# Patient Record
Sex: FEMALE | Race: WHITE | NOT HISPANIC OR LATINO | Employment: OTHER | URBAN - METROPOLITAN AREA
[De-identification: names, ages, dates, MRNs, and addresses within clinical notes are randomized per-mention and may not be internally consistent; named-entity substitution may affect disease eponyms.]

---

## 2017-01-11 ENCOUNTER — HOSPITAL ENCOUNTER (OUTPATIENT)
Dept: LAB | Facility: MEDICAL CENTER | Age: 72
End: 2017-01-11
Attending: FAMILY MEDICINE
Payer: MEDICARE

## 2017-01-11 DIAGNOSIS — R79.89 INCREASED PTH LEVEL: ICD-10-CM

## 2017-01-11 DIAGNOSIS — E55.9 VITAMIN D INSUFFICIENCY: ICD-10-CM

## 2017-01-11 DIAGNOSIS — R74.8 ELEVATED ALKALINE PHOSPHATASE LEVEL: ICD-10-CM

## 2017-01-11 LAB
25(OH)D3 SERPL-MCNC: 44 NG/ML (ref 30–100)
ALP SERPL-CCNC: 98 U/L (ref 30–99)
CALCIUM SERPL-MCNC: 9.1 MG/DL (ref 8.4–10.2)
PTH-INTACT SERPL-MCNC: 80.7 PG/ML (ref 14–72)

## 2017-01-11 PROCEDURE — 36415 COLL VENOUS BLD VENIPUNCTURE: CPT

## 2017-01-11 PROCEDURE — 82310 ASSAY OF CALCIUM: CPT

## 2017-01-11 PROCEDURE — 84075 ASSAY ALKALINE PHOSPHATASE: CPT

## 2017-01-11 PROCEDURE — 83970 ASSAY OF PARATHORMONE: CPT

## 2017-01-11 PROCEDURE — 82306 VITAMIN D 25 HYDROXY: CPT

## 2017-02-23 ENCOUNTER — OFFICE VISIT (OUTPATIENT)
Dept: MEDICAL GROUP | Age: 72
End: 2017-02-23
Payer: MEDICARE

## 2017-02-23 VITALS
SYSTOLIC BLOOD PRESSURE: 130 MMHG | BODY MASS INDEX: 28.99 KG/M2 | DIASTOLIC BLOOD PRESSURE: 88 MMHG | OXYGEN SATURATION: 95 % | RESPIRATION RATE: 16 BRPM | HEART RATE: 86 BPM | HEIGHT: 65 IN | WEIGHT: 174 LBS | TEMPERATURE: 97.7 F

## 2017-02-23 DIAGNOSIS — F41.9 ANXIETY: ICD-10-CM

## 2017-02-23 DIAGNOSIS — I10 ESSENTIAL HYPERTENSION: ICD-10-CM

## 2017-02-23 DIAGNOSIS — R79.89 INCREASED PTH LEVEL: ICD-10-CM

## 2017-02-23 DIAGNOSIS — J01.00 ACUTE NON-RECURRENT MAXILLARY SINUSITIS: ICD-10-CM

## 2017-02-23 DIAGNOSIS — E55.9 VITAMIN D INSUFFICIENCY: ICD-10-CM

## 2017-02-23 DIAGNOSIS — K21.9 GASTROESOPHAGEAL REFLUX DISEASE WITHOUT ESOPHAGITIS: ICD-10-CM

## 2017-02-23 PROCEDURE — 1036F TOBACCO NON-USER: CPT | Performed by: FAMILY MEDICINE

## 2017-02-23 PROCEDURE — 3017F COLORECTAL CA SCREEN DOC REV: CPT | Performed by: FAMILY MEDICINE

## 2017-02-23 PROCEDURE — G8432 DEP SCR NOT DOC, RNG: HCPCS | Performed by: FAMILY MEDICINE

## 2017-02-23 PROCEDURE — 3014F SCREEN MAMMO DOC REV: CPT | Performed by: FAMILY MEDICINE

## 2017-02-23 PROCEDURE — 4040F PNEUMOC VAC/ADMIN/RCVD: CPT | Performed by: FAMILY MEDICINE

## 2017-02-23 PROCEDURE — G8420 CALC BMI NORM PARAMETERS: HCPCS | Performed by: FAMILY MEDICINE

## 2017-02-23 PROCEDURE — G8482 FLU IMMUNIZE ORDER/ADMIN: HCPCS | Performed by: FAMILY MEDICINE

## 2017-02-23 PROCEDURE — 99214 OFFICE O/P EST MOD 30 MIN: CPT | Performed by: FAMILY MEDICINE

## 2017-02-23 PROCEDURE — 1101F PT FALLS ASSESS-DOCD LE1/YR: CPT | Performed by: FAMILY MEDICINE

## 2017-02-23 RX ORDER — RANITIDINE 150 MG/1
TABLET ORAL
Qty: 180 TAB | Refills: 0 | Status: SHIPPED | OUTPATIENT
Start: 2017-02-23 | End: 2017-08-30

## 2017-02-23 RX ORDER — CLARITHROMYCIN 500 MG/1
500 TABLET, COATED ORAL 2 TIMES DAILY
Qty: 28 TAB | Refills: 0 | Status: SHIPPED
Start: 2017-02-23 | End: 2017-06-06

## 2017-02-23 ASSESSMENT — PATIENT HEALTH QUESTIONNAIRE - PHQ9: CLINICAL INTERPRETATION OF PHQ2 SCORE: 0

## 2017-02-23 NOTE — MR AVS SNAPSHOT
"        Martha Gregoryipes   2017 11:00 AM   Office Visit   MRN: [id number]    Department:  [address] [place] [address]   Dept Phone:  [phone]    Description:  Female : 1945   Provider:  Sabra Arguello M.D.           Reason for Visit     Results           Allergies as of 2017     Allergen Noted Reactions    Ace Inhibitors 2013       Cough    Ceclor [Cefaclor] 2013   Hives    Ceftin 2013   Hives    Cephalosporins 2013   Rash    Ciprofloxacin 2013   Rash    Kenalog 2016   Hives    Lamisil [Terbinafine Hcl] 2013       Latex 2013   Rash    Merthiolate [Thimerosal] 2013   Hives    Monistat [Tioconazole] 2013   Itching    Pcn [Penicillins] 2013   Hives    Sulfa Drugs 2013   Hives    Tetracycline 2013         You were diagnosed with     Acute non-recurrent maxillary sinusitis   [3399719]       Increased PTH level   [749805]       Vitamin D insufficiency   [726691]         Vital Signs     Blood Pressure Pulse Temperature Respirations Height Weight    130/88 mmHg 86 36.5 °C (97.7 °F) 16 1.651 m (5' 5\") 78.926 kg (174 lb)    Body Mass Index Oxygen Saturation Smoking Status             28.96 kg/m2 95% Never Smoker          Basic Information     Date Of Birth Sex Race Ethnicity Preferred Language    1945 Female White Non- English      Problem List              ICD-10-CM Priority Class Noted - Resolved    Mild asthma J45.998   9/10/2014 - Present    Elevated liver enzymes R74.8   9/10/2014 - Present    Abnormal thyroid function test R94.6   9/10/2014 - Present    Hyperlipidemia E78.5   9/10/2014 - Present    HTN (hypertension) I10   9/10/2014 - Present    Anxiety F41.9   9/10/2014 - Present    Transient global amnesia G45.4   9/10/2014 - Present    GERD (gastroesophageal reflux disease) K21.9   9/10/2014 - Present    Fatigue R53.83   2015 - Present    Family history of melanoma Z80.8   2016 - Present    OCD (obsessive " compulsive disorder) F42.9   Unknown - Present    Environmental allergies Z91.09   2/21/2016 - Present    Increased PTH level E34.9   5/4/2016 - Present    Vitamin D insufficiency E55.9   5/4/2016 - Present    Elevated alkaline phosphatase level R74.8   6/29/2016 - Present      Health Maintenance        Date Due Completion Dates    IMM DTaP/Tdap/Td Vaccine (1 - Tdap) 6/11/1965 ---    PAP SMEAR 6/11/1967 ---    IMM ZOSTER VACCINE 6/11/2006 ---    IMM PNEUMOCOCCAL 65+ (ADULT) LOW/MEDIUM RISK SERIES (2 of 2 - PPSV23) 11/18/2016 11/18/2015    MAMMOGRAM 6/21/2017 6/21/2016, 6/8/2015, 6/8/2015, 6/8/2015, 5/29/2015    COLONOSCOPY 5/9/2018 5/9/2013    BONE DENSITY 3/2/2021 3/2/2016, 4/1/2013, 2/21/2010, 11/12/2007            Current Immunizations     13-VALENT PCV PREVNAR 11/18/2015    Influenza TIV (IM) 10/25/2016    Influenza Vaccine Quad Inj (Pf) 11/13/2015      Below and/or attached are the medications your provider expects you to take. Review all of your home medications and newly ordered medications with your provider and/or pharmacist. Follow medication instructions as directed by your provider and/or pharmacist. Please keep your medication list with you and share with your provider. Update the information when medications are discontinued, doses are changed, or new medications (including over-the-counter products) are added; and carry medication information at all times in the event of emergency situations     Allergies:  ACE INHIBITORS - (reactions not documented)     CECLOR - Hives     CEFTIN - Hives     CEPHALOSPORINS - Rash     CIPROFLOXACIN - Rash     KENALOG - Hives     LAMISIL - (reactions not documented)     LATEX - Rash     MERTHIOLATE - Hives     MONISTAT - Itching     PCN - Hives     SULFA DRUGS - Hives     TETRACYCLINE - (reactions not documented)               Medications  Valid as of: February 23, 2017 - 11:46 AM    Generic Name Brand Name Tablet Size Instructions for use    Albuterol Sulfate (Aero  Soln) albuterol 108 (90 BASE) MCG/ACT Inhale 2 Puffs by mouth every 6 hours as needed.        Aspirin (Tablet Delayed Response) ECOTRIN 81 MG Take 81 mg by mouth every day.        Calcium-Vitamin D   Take  by mouth.        Cetirizine HCl (Tab) ZYRTEC 10 MG Take 10 mg by mouth every day.        Cholecalciferol (Cap) Vitamin D-3 1000 UNITS Take 2,000 Units by mouth.        Clarithromycin (Tab) BIAXIN 500 MG Take 1 Tab by mouth 2 times a day.        Cyanocobalamin (SL Tab) Vitamin B-12 5000 MCG Place  under tongue.        DHA-EPA-Flaxseed Oil-Vitamin E   Take  by mouth.        Docusate Sodium (Cap) COLACE 100 MG Take 100 mg by mouth every day.        Famotidine-Ca Carb-Mag Hydrox   Take  by mouth.        Fluticasone Propionate (Suspension) FLONASE 50 MCG/ACT Spray 2 Sprays in nose every day.        HydroCHLOROthiazide (Tab) HYDRODIURIL 25 MG Take 1 Tab by mouth every day.        Multiple Vitamins-Minerals   Take  by mouth.        Sertraline HCl (Tab) ZOLOFT 25 MG TAKE 1 TABLET BY MOUTH EVERY DAY.        .                 Medicines prescribed today were sent to:     [facility][id number] - [place], [place] - [address]    [address] [place] [address]    Phone: [phone] Fax: [phone]    Open 24 Hours?: No      Medication refill instructions:       If your prescription bottle indicates you have medication refills left, it is not necessary to call your provider’s office. Please contact your pharmacy and they will refill your medication.    If your prescription bottle indicates you do not have any refills left, you may request refills at any time through one of the following ways: The online [employer] system (except Urgent Care), by calling your provider’s office, or by asking your pharmacy to contact your provider’s office with a refill request. Medication refills are processed only during regular business hours and may not be available until the next business day. Your provider may request additional information  or to have a follow-up visit with you prior to refilling your medication.   *Please Note: Medication refills are assigned a new Rx number when refilled electronically. Your pharmacy may indicate that no refills were authorized even though a new prescription for the same medication is available at the pharmacy. Please request the medicine by name with the pharmacy before contacting your provider for a refill.        Your To Do List     Future Labs/Procedures Complete By Expires    PTH INTACT  As directed 2/23/2018    VITAMIN D,25 HYDROXY  As directed 8/26/2017         Shelf[employer] Access Code: Activation code not generated  Current Shelf[employer]ip Status: Active

## 2017-03-29 PROBLEM — L50.9 URTICARIA, UNSPECIFIED: Status: ACTIVE | Noted: 2017-03-29

## 2017-04-12 PROBLEM — D49.2 NEOPLASM OF UNSPECIFIED BEHAVIOR OF BONE, SOFT TISSUE, AND SKIN: Status: RESOLVED | Noted: 2017-03-29 | Resolved: 2017-04-12

## 2017-05-23 DIAGNOSIS — D72.819 LEUKOPENIA, UNSPECIFIED TYPE: ICD-10-CM

## 2017-05-23 DIAGNOSIS — R74.8 ELEVATED LIVER ENZYMES: ICD-10-CM

## 2017-05-23 DIAGNOSIS — E78.01 FAMILIAL HYPERCHOLESTEROLEMIA: ICD-10-CM

## 2017-05-29 ENCOUNTER — HOSPITAL ENCOUNTER (OUTPATIENT)
Dept: LAB | Facility: MEDICAL CENTER | Age: 72
End: 2017-05-29
Attending: FAMILY MEDICINE
Payer: MEDICARE

## 2017-05-29 DIAGNOSIS — E78.01 FAMILIAL HYPERCHOLESTEROLEMIA: ICD-10-CM

## 2017-05-29 DIAGNOSIS — D72.819 LEUKOPENIA, UNSPECIFIED TYPE: ICD-10-CM

## 2017-05-29 DIAGNOSIS — E55.9 VITAMIN D INSUFFICIENCY: ICD-10-CM

## 2017-05-29 DIAGNOSIS — R79.89 INCREASED PTH LEVEL: ICD-10-CM

## 2017-05-29 DIAGNOSIS — R74.8 ELEVATED LIVER ENZYMES: ICD-10-CM

## 2017-05-29 LAB
25(OH)D3 SERPL-MCNC: 47 NG/ML (ref 30–100)
ALBUMIN SERPL BCP-MCNC: 4.4 G/DL (ref 3.2–4.9)
ALBUMIN/GLOB SERPL: 1.4 G/DL
ALP SERPL-CCNC: 103 U/L (ref 30–99)
ALT SERPL-CCNC: 26 U/L (ref 2–50)
ANION GAP SERPL CALC-SCNC: 6 MMOL/L (ref 0–11.9)
AST SERPL-CCNC: 25 U/L (ref 12–45)
BASOPHILS # BLD AUTO: 1.2 % (ref 0–1.8)
BASOPHILS # BLD: 0.06 K/UL (ref 0–0.12)
BILIRUB SERPL-MCNC: 0.8 MG/DL (ref 0.1–1.5)
BUN SERPL-MCNC: 18 MG/DL (ref 8–22)
CALCIUM SERPL-MCNC: 9.9 MG/DL (ref 8.5–10.5)
CHLORIDE SERPL-SCNC: 103 MMOL/L (ref 96–112)
CHOLEST SERPL-MCNC: 221 MG/DL (ref 100–199)
CO2 SERPL-SCNC: 30 MMOL/L (ref 20–33)
CREAT SERPL-MCNC: 0.82 MG/DL (ref 0.5–1.4)
EOSINOPHIL # BLD AUTO: 0.31 K/UL (ref 0–0.51)
EOSINOPHIL NFR BLD: 6 % (ref 0–6.9)
ERYTHROCYTE [DISTWIDTH] IN BLOOD BY AUTOMATED COUNT: 39.8 FL (ref 35.9–50)
GFR SERPL CREATININE-BSD FRML MDRD: >60 ML/MIN/1.73 M 2
GLOBULIN SER CALC-MCNC: 3.1 G/DL (ref 1.9–3.5)
GLUCOSE SERPL-MCNC: 109 MG/DL (ref 65–99)
HCT VFR BLD AUTO: 45.6 % (ref 37–47)
HDLC SERPL-MCNC: 67 MG/DL
HGB BLD-MCNC: 15.3 G/DL (ref 12–16)
IMM GRANULOCYTES # BLD AUTO: 0.01 K/UL (ref 0–0.11)
IMM GRANULOCYTES NFR BLD AUTO: 0.2 % (ref 0–0.9)
LDLC SERPL CALC-MCNC: 129 MG/DL
LYMPHOCYTES # BLD AUTO: 2.15 K/UL (ref 1–4.8)
LYMPHOCYTES NFR BLD: 41.6 % (ref 22–41)
MCH RBC QN AUTO: 31.5 PG (ref 27–33)
MCHC RBC AUTO-ENTMCNC: 33.6 G/DL (ref 33.6–35)
MCV RBC AUTO: 93.8 FL (ref 81.4–97.8)
MONOCYTES # BLD AUTO: 0.35 K/UL (ref 0–0.85)
MONOCYTES NFR BLD AUTO: 6.8 % (ref 0–13.4)
NEUTROPHILS # BLD AUTO: 2.29 K/UL (ref 2–7.15)
NEUTROPHILS NFR BLD: 44.2 % (ref 44–72)
NRBC # BLD AUTO: 0 K/UL
NRBC BLD AUTO-RTO: 0 /100 WBC
PLATELET # BLD AUTO: 244 K/UL (ref 164–446)
PMV BLD AUTO: 9.7 FL (ref 9–12.9)
POTASSIUM SERPL-SCNC: 4 MMOL/L (ref 3.6–5.5)
PROT SERPL-MCNC: 7.5 G/DL (ref 6–8.2)
PTH-INTACT SERPL-MCNC: 80 PG/ML (ref 14–72)
RBC # BLD AUTO: 4.86 M/UL (ref 4.2–5.4)
SODIUM SERPL-SCNC: 139 MMOL/L (ref 135–145)
TRIGL SERPL-MCNC: 124 MG/DL (ref 0–149)
TSH SERPL DL<=0.005 MIU/L-ACNC: 3.11 UIU/ML (ref 0.3–3.7)
WBC # BLD AUTO: 5.2 K/UL (ref 4.8–10.8)

## 2017-05-29 PROCEDURE — 82306 VITAMIN D 25 HYDROXY: CPT

## 2017-05-29 PROCEDURE — 83970 ASSAY OF PARATHORMONE: CPT

## 2017-05-29 PROCEDURE — 80061 LIPID PANEL: CPT

## 2017-05-29 PROCEDURE — 80053 COMPREHEN METABOLIC PANEL: CPT

## 2017-05-29 PROCEDURE — 36415 COLL VENOUS BLD VENIPUNCTURE: CPT

## 2017-05-29 PROCEDURE — 84443 ASSAY THYROID STIM HORMONE: CPT

## 2017-05-29 PROCEDURE — 85025 COMPLETE CBC W/AUTO DIFF WBC: CPT

## 2017-05-30 ENCOUNTER — PATIENT OUTREACH (OUTPATIENT)
Dept: HEALTH INFORMATION MANAGEMENT | Facility: OTHER | Age: 72
End: 2017-05-30

## 2017-05-30 NOTE — PROGRESS NOTES
5/30/17   -     WebIZ Checked & Epic Updated: yes  HealthConnect Verified: MCR  Verify PCP: yes    Communication Preference Obtained: yes     Review Care Team: yes    Annual Wellness Visit Scheduling  1. Scheduling Status: Pt said that she is already scheduled with her PCP and wants to keep it like that.  Care Gap Scheduling (Attempt to Schedule EACH Overdue Care Gap!)     Health Maintenance Due   Topic Date Due   • Annual Wellness Visit     • PAP SMEAR  Pt will discuss care gaps with PCP   • IMM PNEUMOCOCCAL 65+ (ADULT) LOW/MEDIUM RISK SERIES (2 of 2 - PPSV23)          MyChart Activation: already active

## 2017-05-31 ENCOUNTER — TELEPHONE (OUTPATIENT)
Dept: MEDICAL GROUP | Age: 72
End: 2017-05-31

## 2017-06-01 ENCOUNTER — OFFICE VISIT (OUTPATIENT)
Dept: MEDICAL GROUP | Age: 72
End: 2017-06-01
Payer: MEDICARE

## 2017-06-01 VITALS
TEMPERATURE: 97.2 F | BODY MASS INDEX: 28.77 KG/M2 | OXYGEN SATURATION: 95 % | SYSTOLIC BLOOD PRESSURE: 102 MMHG | DIASTOLIC BLOOD PRESSURE: 78 MMHG | WEIGHT: 179 LBS | HEIGHT: 66 IN | HEART RATE: 58 BPM

## 2017-06-01 DIAGNOSIS — R00.2 PALPITATIONS: ICD-10-CM

## 2017-06-01 DIAGNOSIS — R79.89 INCREASED PTH LEVEL: ICD-10-CM

## 2017-06-01 DIAGNOSIS — Z80.8 FAMILY HISTORY OF MELANOMA: ICD-10-CM

## 2017-06-01 DIAGNOSIS — Z00.00 MEDICARE ANNUAL WELLNESS VISIT, INITIAL: ICD-10-CM

## 2017-06-01 DIAGNOSIS — G45.4 TRANSIENT GLOBAL AMNESIA: ICD-10-CM

## 2017-06-01 DIAGNOSIS — K21.9 GASTROESOPHAGEAL REFLUX DISEASE WITHOUT ESOPHAGITIS: ICD-10-CM

## 2017-06-01 DIAGNOSIS — R74.8 ELEVATED ALKALINE PHOSPHATASE LEVEL: ICD-10-CM

## 2017-06-01 DIAGNOSIS — R53.82 CHRONIC FATIGUE: ICD-10-CM

## 2017-06-01 DIAGNOSIS — Z91.09 ENVIRONMENTAL ALLERGIES: ICD-10-CM

## 2017-06-01 DIAGNOSIS — I10 ESSENTIAL HYPERTENSION: ICD-10-CM

## 2017-06-01 DIAGNOSIS — F41.9 ANXIETY: ICD-10-CM

## 2017-06-01 DIAGNOSIS — J45.909 MILD ASTHMA: ICD-10-CM

## 2017-06-01 DIAGNOSIS — F42.9 OBSESSIVE-COMPULSIVE DISORDER, UNSPECIFIED TYPE: ICD-10-CM

## 2017-06-01 DIAGNOSIS — E55.9 VITAMIN D INSUFFICIENCY: ICD-10-CM

## 2017-06-01 DIAGNOSIS — E78.01 FAMILIAL HYPERCHOLESTEROLEMIA: ICD-10-CM

## 2017-06-01 PROCEDURE — G0438 PPPS, INITIAL VISIT: HCPCS | Performed by: FAMILY MEDICINE

## 2017-06-01 PROCEDURE — 1036F TOBACCO NON-USER: CPT | Performed by: FAMILY MEDICINE

## 2017-06-01 PROCEDURE — G8432 DEP SCR NOT DOC, RNG: HCPCS | Performed by: FAMILY MEDICINE

## 2017-06-01 RX ORDER — PRAVASTATIN SODIUM 10 MG
10 TABLET ORAL DAILY
Qty: 30 TAB | Refills: 3 | Status: SHIPPED | OUTPATIENT
Start: 2017-06-01 | End: 2017-11-27

## 2017-06-01 ASSESSMENT — PATIENT HEALTH QUESTIONNAIRE - PHQ9: CLINICAL INTERPRETATION OF PHQ2 SCORE: 0

## 2017-06-01 NOTE — TELEPHONE ENCOUNTER
ANNUAL WELLNESS VISIT PRE-VISIT PLANNING     1.  Reviewed note from last office visit with PCP: YES    2.  If any orders were placed at last visit, do we have Results/Consult Notes?        •  Labs - Labs ordered, completed and results are in chart.       •  Imaging - Imaging was not ordered at last office visit.       •  Referrals - No referrals were ordered at last office visit.    3.  Immunizations were updated in Epic using WebIZ?: Epic matches WebIZ       •  WebIZ Recommendations:        •  Is patient due for Tdap? NO       •  Is patient due for Shingles? NO     4.  Patient is due for the following Health Maintenance Topics:   Health Maintenance Due   Topic Date Due   • Annual Wellness Visit  1945   • PAP SMEAR  06/11/1967           5.  Reviewed/Updated the following with patient:       •   Preferred Pharmacy? YES       •   Preferred Lab? YES       •   Medications? YES. Was Abstract Encounter opened and chart updated? YES       •   Social History? YES. Was Abstract Encounter opened and chart updated? YES       •   Family History? YES. Was Abstract Encounter opened and chart updated? NO    6.  Care Team Updated:       •   DME Company (gait device, O2, CPAP, etc.): NO       •   Other Specialists (eye doctor, derm, GYN, cardiology, endo, etc): NO    7.  Patient has the following Care Path diagnoses on Problem List:  NONE    8.  Specialty Comments was updated with diagnosis information provided by [facility]: YES    9.  Patient was advised: “This is a free wellness visit. The provider will screen for medical conditions to help you stay healthy. If you have other concerns to address you may be asked to discuss these at a separate visit or there may be an additional fee.”     10.  Patient was informed to arrive 15 min prior to their scheduled appointment and bring in their medication bottles?  YES

## 2017-06-01 NOTE — PROGRESS NOTES
Chief Complaint   Patient presents with   • Annual Wellness Visit         HPI:  Martha Ordonez is a 71 y.o. female here for Medicare Annual Wellness Visit    Elevated PTH- taking vitamin D which seemed to previously improve PTH levels. She will plan to see endocrinology.     Vitamin D insufficiency- taking supplements.     Elevated alkaline phosphatase levels-has been intermittently elevated.  2 weeks ago had RUQ symptoms,which then resolved. Had cholecystectomy in past.     Palpitations- states she feels runs of PAC- feels getting more symptoms. Has had intermittent symptoms previously. Had some evaluation in past and discussed possible beta blocker therapy. With initial walking has had some sob, but better currently. Uses weight 2 times a week.     Hyperlipidemia-has been mostly diet controlled.     Hypertension-BP has been stable on hctz 25 mg daily. Tolerates well.     GERD-stable, takes pepcid complete.     OCD/anxiety- zoloft 25 mg. Has been stable on medication therapy.     Chronic fatigue- has been stable.     Allergies- stable on zyrtec.     Mild asthma- albuterol as needed. Has been stable.     Family hx of skin cancer-melanoma- she has annual skin exam.     TGA- hs been stable.     Mammogram- scheduled.   Pap smear- has been a while.  No abnormal in past.   Has had hysterectomy. Had PAP after hysterectomy.   No further pap needed.      Ref. Range 5/29/2017 10:57   WBC Latest Ref Range: 4.8-10.8 K/uL 5.2   RBC Latest Ref Range: 4.20-5.40 M/uL 4.86   Hemoglobin Latest Ref Range: 12.0-16.0 g/dL 15.3   Hematocrit Latest Ref Range: 37.0-47.0 % 45.6   MCV Latest Ref Range: 81.4-97.8 fL 93.8   MCH Latest Ref Range: 27.0-33.0 pg 31.5   MCHC Latest Ref Range: 33.6-35.0 g/dL 33.6   RDW Latest Ref Range: 35.9-50.0 fL 39.8   Platelet Count Latest Ref Range: 164-446 K/uL 244   MPV Latest Ref Range: 9.0-12.9 fL 9.7   Neutrophils-Polys Latest Ref Range: 44.00-72.00 % 44.20   Neutrophils (Absolute) Latest Ref Range:  2.00-7.15 K/uL 2.29   Lymphocytes Latest Ref Range: 22.00-41.00 % 41.60 (H)   Lymphs (Absolute) Latest Ref Range: 1.00-4.80 K/uL 2.15   Monocytes Latest Ref Range: 0.00-13.40 % 6.80   Monos (Absolute) Latest Ref Range: 0.00-0.85 K/uL 0.35   Eosinophils Latest Ref Range: 0.00-6.90 % 6.00   Eos (Absolute) Latest Ref Range: 0.00-0.51 K/uL 0.31   Basophils Latest Ref Range: 0.00-1.80 % 1.20   Baso (Absolute) Latest Ref Range: 0.00-0.12 K/uL 0.06   Immature Granulocytes Latest Ref Range: 0.00-0.90 % 0.20   Immature Granulocytes (abs) Latest Ref Range: 0.00-0.11 K/uL 0.01   Nucleated RBC Latest Units: /100 WBC 0.00   NRBC (Absolute) Latest Units: K/uL 0.00   Sodium Latest Ref Range: 135-145 mmol/L 139   Potassium Latest Ref Range: 3.6-5.5 mmol/L 4.0   Chloride Latest Ref Range:  mmol/L 103   Co2 Latest Ref Range: 20-33 mmol/L 30   Anion Gap Latest Ref Range: 0.0-11.9  6.0   Glucose Latest Ref Range: 65-99 mg/dL 109 (H)   Bun Latest Ref Range: 8-22 mg/dL 18   Creatinine Latest Ref Range: 0.50-1.40 mg/dL 0.82   GFR If  Latest Ref Range: >60 mL/min/1.73 m 2 >60   GFR If Non  Latest Ref Range: >60 mL/min/1.73 m 2 >60   Calcium Latest Ref Range: 8.5-10.5 mg/dL 9.9   AST(SGOT) Latest Ref Range: 12-45 U/L 25   ALT(SGPT) Latest Ref Range: 2-50 U/L 26   Alkaline Phosphatase Latest Ref Range: 30-99 U/L 103 (H)   Total Bilirubin Latest Ref Range: 0.1-1.5 mg/dL 0.8   Albumin Latest Ref Range: 3.2-4.9 g/dL 4.4   Total Protein Latest Ref Range: 6.0-8.2 g/dL 7.5   Globulin Latest Ref Range: 1.9-3.5 g/dL 3.1   A-G Ratio Latest Units: g/dL 1.4   Cholesterol,Tot Latest Ref Range: 100-199 mg/dL 221 (H)   Triglycerides Latest Ref Range: 0-149 mg/dL 124   HDL Latest Ref Range: >=40 mg/dL 67   LDL Latest Ref Range: <100 mg/dL 129 (H)   25-Hydroxy   Vitamin D 25 Latest Ref Range:  ng/mL 47   TSH Latest Ref Range: 0.300-3.700 uIU/mL 3.110   Pth, Intact Latest Ref Range: 14.0-72.0 pg/mL 80.0 (H)        Patient Active Problem List    Diagnosis Date Noted   • Palpitations 06/06/2017   • Elevated alkaline phosphatase level 06/29/2016   • Increased PTH level 05/04/2016   • Vitamin D insufficiency 05/04/2016   • Environmental allergies 02/21/2016   • OCD (obsessive compulsive disorder)    • Family history of melanoma 02/12/2016   • Fatigue 12/31/2015   • Mild asthma 09/10/2014   • Hyperlipidemia 09/10/2014   • HTN (hypertension) 09/10/2014   • Anxiety 09/10/2014   • Transient global amnesia 09/10/2014   • GERD (gastroesophageal reflux disease) 09/10/2014       Current Outpatient Prescriptions   Medication Sig Dispense Refill   • pravastatin (PRAVACHOL) 10 MG Tab Take 1 Tab by mouth every day. 30 Tab 3   • hydrochlorothiazide (HYDRODIURIL) 25 MG Tab TAKE 1 TAB BY MOUTH EVERY DAY. 90 Tab 0   • sertraline (ZOLOFT) 25 MG tablet Take 1 Tab by mouth every day. 90 Tab 2   • Famotidine-Ca Carb-Mag Hydrox (PEPCID COMPLETE PO) Take  by mouth.     • albuterol (VENTOLIN OR PROVENTIL) 108 (90 BASE) MCG/ACT Aero Soln inhalation aerosol Inhale 2 Puffs by mouth every 6 hours as needed. 8.5 g 3   • fluticasone (FLONASE) 50 MCG/ACT nasal spray Spray 2 Sprays in nose every day. 16 g 0   • Multiple Vitamins-Minerals (CENTRUM SILVER ULTRA WOMENS PO) Take  by mouth.     • Cholecalciferol (VITAMIN D-3) 1000 UNITS CAPS Take 5,000 Units by mouth.     • cetirizine (ZYRTEC) 10 MG TABS Take 10 mg by mouth every day.     • aspirin EC (ECOTRIN) 81 MG TBEC Take 81 mg by mouth every day.     • Calcium-Vitamin D (CALTRATE 600 PLUS-VIT D PO) Take  by mouth.     • Cyanocobalamin (VITAMIN B-12) 5000 MCG SUBL Place  under tongue.     • docusate sodium (COLACE) 100 MG CAPS Take 100 mg by mouth every day.     • DHA-EPA-Flaxseed Oil-Vitamin E (THERA TEARS NUTRITION PO) Take  by mouth.       No current facility-administered medications for this visit.        Patient is taking medications as noted in medication list.  Current supplements as per  medication list.   Chronic narcotic pain medicines: no    Allergies: Ace inhibitors; Ceclor; Ceftin; Cephalosporins; Ciprofloxacin; Kenalog; Lamisil; Latex; Merthiolate; Monistat; and Pcn    Current social contact/activities: caring for grandchildren     Is patient current with immunizations?  Yes.     She  reports that she has never smoked. She has never used smokeless tobacco. She reports that she drinks alcohol. She reports that she does not use illicit drugs.  Counseling given: Yes        DPA/Advanced Directive:  Patient has Advanced Directive, but it is not on file. Instructed to bring in a copy to scan into their chart.    ROS:    Gait: Uses no assistive device   Ostomy: no   Other tubes: no   Amputations: no   Chronic oxygen use: no   Last eye exam: 10/2016   Wears hearing aids: no   : Denies incontinence.       Depression Screening    Little interest or pleasure in doing things?  0 - not at all  Feeling down, depressed, or hopeless?  0 - not at all  Patient Health Questionnaire Score: 0  If depressive symptoms identified deferred to follow up visit unless specifically addressed in assessment and plan.    Interpretation of PHQ-9 Total Score   Score Severity   1-4 Minimal Depression   5-9 Mild Depression   10-14 Moderate Depression   15-19 Moderately Severe Depression   20-27 Severe Depression    Screening for Cognitive Impairment    Three Minute Recall (banana, sunrise, fence)  3/3    Draw clock face with all 12 numbers set to the hand to show 10 minutes past 11 o'clock  5/5  If cognitive concerns identified deferred to follow up visit unless specifically addressed in assessment and plan.    Fall Risk Assessment    Has the patient had two or more falls in the last year or any fall with injury in the last year?  No  If Fall Risk identified deferred to follow up visit unless specifically addressed in assessment and plan.    Safety Assessment    Throw rugs on floor.  Yes  Handrails on all stairs.  Yes  Good  lighting in all hallways.  Yes  Difficulty hearing.  No  Patient counseled about all safety risks that were identified.    Functional Assessment ADLs    Are there any barriers preventing you from cooking for yourself or meeting nutritional needs?  No.    Are there any barriers preventing you from driving safely or obtaining transportation?  No.    Are there any barriers preventing you from using a telephone or calling for help?  No.    Are there any barriers preventing you from shopping?  No.    Are there any barriers preventing you from taking care of your own finances?  No.    Are there any barriers preventing you from managing your medications?  No.    Are currently engaging any exercise or physical activity?  Yes.  Walking 3 times a week, weights twice a week.    Health Maintenance Summary                Annual Wellness Visit Overdue 1945     PAP SMEAR Overdue 6/11/1967     MAMMOGRAM Next Due 6/21/2017      Done 6/21/2016 MA-SCREEN MAMMO W/CAD-BILAT     Patient has more history with this topic...    COLONOSCOPY Next Due 5/9/2018      Done 5/9/2013 AMB REFERRAL TO GI FOR COLONOSCOPY    IMM DTaP/Tdap/Td Vaccine Next Due 12/1/2020      Done 12/1/2010 Imm Admin: Tdap Vaccine    BONE DENSITY Next Due 3/2/2021      Done 3/2/2016 DS-BONE DENSITY STUDY (DEXA)     Patient has more history with this topic...          Patient Care Team:  Sabra Arguello M.D. as PCP - General (Family Medicine)  To Murray M.D. as Consulting Physician (Endocrinology)  Mike Villanueva M.D. as Consulting Physician (Ophthalmology)    Social History   Substance Use Topics   • Smoking status: Never Smoker    • Smokeless tobacco: Never Used   • Alcohol Use: 0.0 oz/week     0 Standard drinks or equivalent per week      Comment: glass a wine a week rarely     Family History   Problem Relation Age of Onset   • Hypertension Mother    • Heart Disease Father      CHF   • Cancer Father      melanoma   • Hypertension Sister    • Stroke Sister   "  • Cancer Paternal Grandfather      colon   • Heart Disease Paternal Grandfather    • Stroke Paternal Grandfather    • Diabetes Paternal Grandfather    • Other Maternal Grandmother      [email or web address]   • Heart Attack Maternal Grandfather    • Stroke Maternal Grandfather    • Stroke Paternal Grandmother      She  has a past medical history of GERD (gastroesophageal reflux disease); Pancreatitis; S/P ERCP; Transient global amnesia; PCOS (polycystic ovarian syndrome); Family history of melanoma; Asthma; OCD (obsessive compulsive disorder); Anxiety; Glaucoma suspect of both eyes; Basal cell carcinoma; Elevated liver enzymes (9/10/2014); and Abnormal thyroid function test (9/10/2014).   Past Surgical History   Procedure Laterality Date   • Cholecystectomy     • Tonsillectomy     • Lumpectomy       benign   • Abdominal hysterectomy total       ovaries remain   • Cardiac cath, right heart       normal, EKG was Abnormal    • Eye surgery       b/l iridotomy   • Us-needle core bx-breast panel         Exam:     Blood pressure 102/78, pulse 58, temperature 36.2 °C (97.2 °F), height 1.676 m (5' 6\"), weight 81.194 kg (179 lb), SpO2 95 %. Body mass index is 28.91 kg/(m^2).  Hearing excellent.    Dentition good  Alert, oriented in no acute distress.  Eye contact is good, speech goal directed, affect calm  Constitutional: She appears well-developed and well-nourished. She appears not diaphoretic. No distress.   HENT: Right Ear: External ear normal. Left Ear: External ear normal. Tympanic membranes clear and intact.   Nose: Nose normal.   Mouth/Throat: Oropharynx is clear and moist. No oropharyngeal exudate.     Eyes: Conjunctivae and extraocular motions are normal. Pupils are equal, round, and reactive to light. No scleral icterus.   Neck: Normal range of motion. Neck supple. No thyromegaly present.   Cardiovascular: Normal rate, regular rhythm, normal heart sounds and intact distal pulses.  Exam reveals no gallop and no friction rub.  " No murmur heard. No carotid bruits.   Pulmonary/Chest: Effort normal and breath sounds normal. No respiratory distress. She has no wheezes. She has no rales.   Abdominal: Soft. Bowel sounds are normal. She exhibits no distension and no mass. No tenderness. She has no rebound and no guarding.   Lymphadenopathy:  She has no cervical adenopathy.   Neurological: She is alert. She has normal reflexes. No cranial nerve deficit. She exhibits normal muscle tone.   Skin: Skin is warm and dry. No rash noted. She is not diaphoretic. No erythema.   Psychiatric: She has a normal mood and affect. Her behavior is normal.   Musculoskeletal: She exhibits no edema.       Assessment and Plan. The following treatment and monitoring plan is recommended:      71 year old female.     1. Medicare annual wellness visit, initial   -Discussed calcium intake, healthy diet, and routine exercise.     2. Increased PTH level- previously improved on vitamin D supplement, appears elevated again.   -She will plan to follow up with endocrinology .     3. Vitamin D insufficiency- stable, continue supplement therapy.      4. Elevated alkaline phosphatase level- minimally elevated. Has been intermittently elevated. Will monitor.      5. Palpitations- hx of PAC. Appears more frequent. Obtain holter monitor.  HOLTER MONITOR STUDY    EKG - Ancillary Performed   6. Familial hypercholesterolemia- stable. Reviewed recommendations for medication therapy. Start statin therapy, reviewed possible side effects. Low cholesterol, high fiber diet.   pravastatin (PRAVACHOL) 10 MG Tab    COMP METABOLIC PANEL    LIPID PROFILE   7. Essential hypertension- controlled. BP slightly lower end today. Monitor at this time. Continue current medication.     8. Gastroesophageal reflux disease without esophagitis - stable, continue current medication. Modify diet.     9. Obsessive-compulsive disorder, unspecified type - stable, continue current medication.      10. Anxiety -  stable, continue current medication.     11. Chronic fatigue- stable. Lifestyle modification. Monitor.      12. Environmental allergies - stable, continue current medication.     13. Mild asthma - stable, continue current medication as needed.     14. Family history of melanoma- stable. Follow up with dermatology routinely.      15. Transient global amnesia- stable. Monitor.         Services suggested: No services needed at this time  Health Care Screening recommendations as per orders if indicated.  Referrals offered: PT/OT/Nutrition counseling/Behavioral Health/Smoking cessation as per orders if indicated.    Discussion today about general wellness and lifestyle habits:    · Prevent falls and reduce trip hazards; Cautioned about securing or removing rugs.  · Have a working fire alarm and carbon monoxide detector;   · Engage in regular physical activity and social activities       Follow-up: Return in about 2 months (around 8/1/2017).     Recommend routine annual exam.

## 2017-06-01 NOTE — MR AVS SNAPSHOT
"        Martha Boateng Mery   2017 1:00 PM   Office Visit   MRN: [id number]    Department:  [address] [place] [address]   Dept Phone:  [phone]    Description:  Female : 1945   Provider:  Sabra Arguello M.D.; [facility]            Reason for Visit     Annual Wellness Visit           Allergies as of 2017     Allergen Noted Reactions    Ace Inhibitors 2013       Cough    Ceclor [Cefaclor] 2013   Hives    Ceftin 2013   Hives    Cephalosporins 2013   Rash    Ciprofloxacin 2013   Rash    Kenalog 2016   Hives    Lamisil [Terbinafine Hcl] 2013       Latex 2013   Rash    Merthiolate [Thimerosal] 2013   Hives    Monistat [Tioconazole] 2013   Itching    Pcn [Penicillins] 2013   Hives      You were diagnosed with     Familial hypercholesterolemia   [100771]       Elevated liver enzymes   [671577]       Abnormal thyroid function test   [922040]       Essential hypertension   [5044959]       Mild asthma   [626372]       Gastroesophageal reflux disease without esophagitis   [164812]       Chronic fatigue   [179883]       Palpitations   [785.1.ICD-9-CM]       Obsessive-compulsive disorder, unspecified type   [1716545]       Environmental allergies   [304045]       Increased PTH level   [737942]       Elevated alkaline phosphatase level   [124130]       Vitamin D insufficiency   [036677]       Family history of melanoma   [453754]         Vital Signs     Blood Pressure Pulse Temperature Height Weight Body Mass Index    102/78 mmHg 58 36.2 °C (97.2 °F) 1.676 m (5' 6\") 81.194 kg (179 lb) 28.91 kg/m2    Oxygen Saturation Smoking Status                95% Never Smoker           Basic Information     Date Of Birth Sex Race Ethnicity Preferred Language    1945 Female White Non- English      Your appointments     Aug 02, 2017  2:00 PM   Established Patient with Sabra Arguello M.D.   [facility] [address] [place] [address] " [place] [id number] [phone]           You will be receiving a confirmation call a few days before your appointment from our automated call confirmation system.              Problem List              ICD-10-CM Priority Class Noted - Resolved    Mild asthma J45.998   9/10/2014 - Present    Elevated liver enzymes R74.8   9/10/2014 - Present    Abnormal thyroid function test R94.6   9/10/2014 - Present    Hyperlipidemia E78.5   9/10/2014 - Present    HTN (hypertension) I10   9/10/2014 - Present    Anxiety F41.9   9/10/2014 - Present    Transient global amnesia G45.4   9/10/2014 - Present    GERD (gastroesophageal reflux disease) K21.9   9/10/2014 - Present    Fatigue R53.83   12/31/2015 - Present    Family history of melanoma Z80.8   2/12/2016 - Present    OCD (obsessive compulsive disorder) F42.9   Unknown - Present    Environmental allergies Z91.09   2/21/2016 - Present    Increased PTH level E34.9   5/4/2016 - Present    Vitamin D insufficiency E55.9   5/4/2016 - Present    Elevated alkaline phosphatase level R74.8   6/29/2016 - Present      Health Maintenance        Date Due Completion Dates    PAP SMEAR 6/11/1967 ---    MAMMOGRAM 6/21/2017 6/21/2016, 5/29/2015    COLONOSCOPY 5/9/2018 5/9/2013    IMM DTaP/Tdap/Td Vaccine (2 - Td) 12/1/2020 12/1/2010    BONE DENSITY 3/2/2021 3/2/2016, 4/1/2013, 2/21/2010, 11/12/2007            Current Immunizations     13-VALENT PCV PREVNAR 11/18/2015    Influenza TIV (IM) 10/25/2016    Influenza Vaccine Quad Inj (Pf) 11/13/2015    Pneumococcal polysaccharide vaccine (PPSV-23) 4/1/2013    SHINGLES VACCINE 11/25/2011    Tdap Vaccine 12/1/2010      Below and/or attached are the medications your provider expects you to take. Review all of your home medications and newly ordered medications with your provider and/or pharmacist. Follow medication instructions as directed by your provider and/or pharmacist. Please keep your medication list with you and share with your  provider. Update the information when medications are discontinued, doses are changed, or new medications (including over-the-counter products) are added; and carry medication information at all times in the event of emergency situations     Allergies:  ACE INHIBITORS - (reactions not documented)     CECLOR - Hives     CEFTIN - Hives     CEPHALOSPORINS - Rash     CIPROFLOXACIN - Rash     KENALOG - Hives     LAMISIL - (reactions not documented)     LATEX - Rash     MERTHIOLATE - Hives     MONISTAT - Itching     PCN - Hives               Medications  Valid as of: June 01, 2017 -  2:00 PM    Generic Name Brand Name Tablet Size Instructions for use    Albuterol Sulfate (Aero Soln) albuterol 108 (90 BASE) MCG/ACT Inhale 2 Puffs by mouth every 6 hours as needed.        Aspirin (Tablet Delayed Response) ECOTRIN 81 MG Take 81 mg by mouth every day.        Calcium-Vitamin D   Take  by mouth.        Cetirizine HCl (Tab) ZYRTEC 10 MG Take 10 mg by mouth every day.        Cholecalciferol (Cap) Vitamin D-3 1000 UNITS Take 5,000 Units by mouth.        Clarithromycin (Tab) BIAXIN 500 MG Take 1 Tab by mouth 2 times a day.        Cyanocobalamin (SL Tab) Vitamin B-12 5000 MCG Place  under tongue.        DHA-EPA-Flaxseed Oil-Vitamin E   Take  by mouth.        Docusate Sodium (Cap) COLACE 100 MG Take 100 mg by mouth every day.        Famotidine-Ca Carb-Mag Hydrox   Take  by mouth.        Fluticasone Propionate (Suspension) FLONASE 50 MCG/ACT Spray 2 Sprays in nose every day.        HydroCHLOROthiazide (Tab) HYDRODIURIL 25 MG TAKE 1 TAB BY MOUTH EVERY DAY.        Multiple Vitamins-Minerals   Take  by mouth.        Pravastatin Sodium (Tab) PRAVACHOL 10 MG Take 1 Tab by mouth every day.        Sertraline HCl (Tab) ZOLOFT 25 MG Take 1 Tab by mouth every day.        .                 Medicines prescribed today were sent to:     [facility]ACY #[id number] - [place] - [address]    [address] [address]    Phone: [phone]  Fax: [phone]    Open 24 Hours?: No      Medication refill instructions:       If your prescription bottle indicates you have medication refills left, it is not necessary to call your provider’s office. Please contact your pharmacy and they will refill your medication.    If your prescription bottle indicates you do not have any refills left, you may request refills at any time through one of the following ways: The online Milyoni system (except Urgent Care), by calling your provider’s office, or by asking your pharmacy to contact your provider’s office with a refill request. Medication refills are processed only during regular business hours and may not be available until the next business day. Your provider may request additional information or to have a follow-up visit with you prior to refilling your medication.   *Please Note: Medication refills are assigned a new Rx number when refilled electronically. Your pharmacy may indicate that no refills were authorized even though a new prescription for the same medication is available at the pharmacy. Please request the medicine by name with the pharmacy before contacting your provider for a refill.        Your To Do List     Future Labs/Procedures Complete By Expires    COMP METABOLIC PANEL  As directed 11/29/2017    HOLTER MONITOR STUDY  As directed 6/1/2018    LIPID PROFILE  As directed 11/29/2017         Milyoni Access Code: Activation code not generated  Current Milyoni Status: Active

## 2017-06-06 PROBLEM — R00.2 PALPITATIONS: Status: ACTIVE | Noted: 2017-06-06

## 2017-06-06 PROBLEM — I49.1 PAC (PREMATURE ATRIAL CONTRACTION): Status: ACTIVE | Noted: 2017-06-06

## 2017-06-14 ENCOUNTER — OFFICE VISIT (OUTPATIENT)
Dept: ENDOCRINOLOGY | Facility: MEDICAL CENTER | Age: 72
End: 2017-06-14
Payer: MEDICARE

## 2017-06-14 VITALS
SYSTOLIC BLOOD PRESSURE: 104 MMHG | HEIGHT: 66 IN | WEIGHT: 176 LBS | BODY MASS INDEX: 28.28 KG/M2 | RESPIRATION RATE: 14 BRPM | DIASTOLIC BLOOD PRESSURE: 80 MMHG | HEART RATE: 67 BPM | OXYGEN SATURATION: 95 %

## 2017-06-14 DIAGNOSIS — R79.89 INCREASED PTH LEVEL: Primary | ICD-10-CM

## 2017-06-14 PROCEDURE — 99213 OFFICE O/P EST LOW 20 MIN: CPT | Performed by: INTERNAL MEDICINE

## 2017-06-14 NOTE — MR AVS SNAPSHOT
"        Martha Ordonez   2017 3:20 PM   Office Visit   MRN: [id number]    Department:  Endocrinology [facility]   Dept Phone:  [phone]    Description:  Female : 1945   Provider:  To Murray M.D.           Allergies as of 2017     Allergen Noted Reactions    Ace Inhibitors 2013       Cough    Ceclor [Cefaclor] 2013   Hives    Ceftin 2013   Hives    Cephalosporins 2013   Rash    Ciprofloxacin 2013   Rash    Kenalog 2016   Hives    Lamisil [Terbinafine Hcl] 2013       Latex 2013   Rash    Merthiolate [Thimerosal] 2013   Hives    Monistat [Tioconazole] 2013   Itching    Pcn [Penicillins] 2013   Hives      You were diagnosed with     Increased PTH level   [477551]  -  Primary       Vital Signs     Blood Pressure Pulse Respirations Height Weight Body Mass Index    104/80 mmHg 67 14 1.676 m (5' 6\") 79.833 kg (176 lb) 28.42 kg/m2    Oxygen Saturation Smoking Status                95% Never Smoker           Basic Information     Date Of Birth Sex Race Ethnicity Preferred Language    1945 Female White Non- English      Your appointments     Aug 02, 2017  2:00 PM   Established Patient with Sabra Arguello M.D.   [address] [id number] [phone]           You will be receiving a confirmation call a few days before your appointment from our automated call confirmation system.              Problem List              ICD-10-CM Priority Class Noted - Resolved    Mild asthma J45.998   9/10/2014 - Present    Hyperlipidemia E78.5   9/10/2014 - Present    HTN (hypertension) I10   9/10/2014 - Present    Anxiety F41.9   9/10/2014 - Present    Transient global amnesia G45.4   9/10/2014 - Present    GERD (gastroesophageal reflux disease) K21.9   9/10/2014 - Present    Fatigue R53.83   2015 - Present    Family history of melanoma Z80.8   2016 - Present    OCD " (obsessive compulsive disorder) F42.9   Unknown - Present    Environmental allergies Z91.09   2/21/2016 - Present    Increased PTH level E34.9   5/4/2016 - Present    Vitamin D insufficiency E55.9   5/4/2016 - Present    Elevated alkaline phosphatase level R74.8   6/29/2016 - Present    Palpitations R00.2   6/6/2017 - Present      Health Maintenance        Date Due Completion Dates    MAMMOGRAM 6/21/2017 6/21/2016, 5/29/2015    COLONOSCOPY 5/9/2018 5/9/2013    IMM DTaP/Tdap/Td Vaccine (2 - Td) 12/1/2020 12/1/2010    BONE DENSITY 3/2/2021 3/2/2016, 4/1/2013, 2/21/2010, 11/12/2007            Current Immunizations     13-VALENT PCV PREVNAR 11/18/2015    Influenza TIV (IM) 10/25/2016    Influenza Vaccine Quad Inj (Pf) 11/13/2015    Pneumococcal polysaccharide vaccine (PPSV-23) 4/1/2013    SHINGLES VACCINE 11/25/2011    Tdap Vaccine 12/1/2010      Below and/or attached are the medications your provider expects you to take. Review all of your home medications and newly ordered medications with your provider and/or pharmacist. Follow medication instructions as directed by your provider and/or pharmacist. Please keep your medication list with you and share with your provider. Update the information when medications are discontinued, doses are changed, or new medications (including over-the-counter products) are added; and carry medication information at all times in the event of emergency situations     Allergies:  ACE INHIBITORS - (reactions not documented)     CECLOR - Hives     CEFTIN - Hives     CEPHALOSPORINS - Rash     CIPROFLOXACIN - Rash     KENALOG - Hives     LAMISIL - (reactions not documented)     LATEX - Rash     MERTHIOLATE - Hives     MONISTAT - Itching     PCN - Hives               Medications  Valid as of: June 14, 2017 -  4:00 PM    Generic Name Brand Name Tablet Size Instructions for use    Albuterol Sulfate (Aero Soln) albuterol 108 (90 BASE) MCG/ACT Inhale 2 Puffs by mouth every 6 hours as needed.           Aspirin (Tablet Delayed Response) ECOTRIN 81 MG Take 81 mg by mouth every day.        Calcium-Vitamin D   Take  by mouth.        Cetirizine HCl (Tab) ZYRTEC 10 MG Take 10 mg by mouth every day.        Cholecalciferol (Cap) Vitamin D-3 1000 UNITS Take 5,000 Units by mouth.        Cyanocobalamin (SL Tab) Vitamin B-12 5000 MCG Place  under tongue.        DHA-EPA-Flaxseed Oil-Vitamin E   Take  by mouth.        Docusate Sodium (Cap) COLACE 100 MG Take 100 mg by mouth every day.        Famotidine-Ca Carb-Mag Hydrox   Take  by mouth.        Fluticasone Propionate (Suspension) FLONASE 50 MCG/ACT Spray 2 Sprays in nose every day.        HydroCHLOROthiazide (Tab) HYDRODIURIL 25 MG TAKE 1 TAB BY MOUTH EVERY DAY.        Multiple Vitamins-Minerals   Take  by mouth.        Pravastatin Sodium (Tab) PRAVACHOL 10 MG Take 1 Tab by mouth every day.        Sertraline HCl (Tab) ZOLOFT 25 MG Take 1 Tab by mouth every day.        .                 Medicines prescribed today were sent to:     [facility]/PHARMACY #[id number] - [place], [place] - [address]    [address] [facility] [place] [address]    Phone: [phone] Fax: [phone]    Open 24 Hours?: No      Medication refill instructions:       If your prescription bottle indicates you have medication refills left, it is not necessary to call your provider’s office. Please contact your pharmacy and they will refill your medication.    If your prescription bottle indicates you do not have any refills left, you may request refills at any time through one of the following ways: The online Hordspot system (except Urgent Care), by calling your provider’s office, or by asking your pharmacy to contact your provider’s office with a refill request. Medication refills are processed only during regular business hours and may not be available until the next business day. Your provider may request additional information or to have a follow-up visit with you prior to refilling your medication.   *Please Note:  Medication refills are assigned a new Rx number when refilled electronically. Your pharmacy may indicate that no refills were authorized even though a new prescription for the same medication is available at the pharmacy. Please request the medicine by name with the pharmacy before contacting your provider for a refill.        Your To Do List     Future Labs/Procedures Complete By Expires    NM-PARATHYROID (SESTAMIBI) SCAN  As directed 6/14/2018         Partnered Access Code: Activation code not generated  Current Partnered Status: Active

## 2017-06-15 NOTE — PROGRESS NOTES
Chief Complaint   Patient presents with   • Hyperparathyroidism        HPI:     Hyperparathyroidism           Last year in August I concluded that she did not have hyperparathyroidism. At that time when we corrected vitamin D deficiency obtained a level of 44 her PTH level dropped to 5.7. Calcium was 9.3 although we don't have an albumin for that level her albumin is usually 4 or above. He has continued to monitor her levels. In January of this year her vitamin D stayed normal at 44 and her parathyroid hormone level jessica to 80 at that time her calcium level was 9.1. Alkaline phosphatase normal at 98. Most recently last month again her vitamin D level is normal at 47 but her PTH remains elevated slightly at 80 (14-72). At the same time her calcium level is 9.9 with an albumin of 4.4 and this corrects to a calcium level of 9.6. She understands there is a condition of normocalcemic hyperparathyroidism and she wants to pursue this further.              He has fatigue, insomnia, and occasional PAT    I will order a nuclear medicine sestamibi parathyroid scan. She did have a thyroid ultrasound in July 2016 that did not show any nodules suspicious for parathyroid glands. Thyroid gland also showed no nodule and was heterogeneous. Her current TSH level is 3.1.    ROS:  All other systems reported as negative or unchanged since last exam      Allergies:   Allergies   Allergen Reactions   • Ace Inhibitors      Cough   • Ceclor [Cefaclor] Hives   • Ceftin Hives   • Cephalosporins Rash   • Ciprofloxacin Rash   • Kenalog Hives   • Lamisil [Terbinafine Hcl]    • Latex Rash   • Merthiolate [Thimerosal] Hives   • Monistat [Tioconazole] Itching   • Pcn [Penicillins] Hives       Current medicines including changes today:  Current Outpatient Prescriptions   Medication Sig Dispense Refill   • hydrochlorothiazide (HYDRODIURIL) 25 MG Tab TAKE 1 TAB BY MOUTH EVERY DAY. 90 Tab 0   • sertraline (ZOLOFT) 25 MG tablet Take 1 Tab by mouth every  "day. 90 Tab 2   • Famotidine-Ca Carb-Mag Hydrox (PEPCID COMPLETE PO) Take  by mouth.     • Multiple Vitamins-Minerals (CENTRUM SILVER ULTRA WOMENS PO) Take  by mouth.     • Cholecalciferol (VITAMIN D-3) 1000 UNITS CAPS Take 5,000 Units by mouth.     • cetirizine (ZYRTEC) 10 MG TABS Take 10 mg by mouth every day.     • aspirin EC (ECOTRIN) 81 MG TBEC Take 81 mg by mouth every day.     • pravastatin (PRAVACHOL) 10 MG Tab Take 1 Tab by mouth every day. 30 Tab 3   • albuterol (VENTOLIN OR PROVENTIL) 108 (90 BASE) MCG/ACT Aero Soln inhalation aerosol Inhale 2 Puffs by mouth every 6 hours as needed. 8.5 g 3   • fluticasone (FLONASE) 50 MCG/ACT nasal spray Spray 2 Sprays in nose every day. 16 g 0   • Calcium-Vitamin D (CALTRATE 600 PLUS-VIT D PO) Take  by mouth.     • Cyanocobalamin (VITAMIN B-12) 5000 MCG SUBL Place  under tongue.     • docusate sodium (COLACE) 100 MG CAPS Take 100 mg by mouth every day.     • DHA-EPA-Flaxseed Oil-Vitamin E (THERA TEARS NUTRITION PO) Take  by mouth.       No current facility-administered medications for this visit.        Past Medical History   Diagnosis Date   • GERD (gastroesophageal reflux disease)    • Pancreatitis    • S/P ERCP    • Transient global amnesia    • PCOS (polycystic ovarian syndrome)    • Family history of melanoma    • Asthma    • OCD (obsessive compulsive disorder)    • Anxiety    • Glaucoma suspect of both eyes    • Basal cell carcinoma      squamos cell right ear   • Elevated liver enzymes 9/10/2014   • Abnormal thyroid function test 9/10/2014       PHYSICAL EXAM:    /80 mmHg  Pulse 67  Resp 14  Ht 1.676 m (5' 6\")  Wt 79.833 kg (176 lb)  BMI 28.42 kg/m2  SpO2 95%    Gen.   appears healthy     Skin   appropriate for sex and age    HEENT  unremarkable    Neck   no adenopathy or other palpable nodules in the area of the thyroid gland    Heart  regular    Extremities  no edema    Neuro  gait and station normal    Psych  appropriate, calm, " pleasant      ASSESSMENT AND RECOMMENDATIONS    1. Increased PTH level              Normocalcemic. Normal vitamin D levels.  - NM-PARATHYROID (SESTAMIBI) SCAN; Future      DISPOSITION: Follow-up scan report results by telephone or My chart       To Murray M.D.    Copies to: Sabra Arguello M.D. [phone]

## 2017-06-21 ENCOUNTER — HOSPITAL ENCOUNTER (OUTPATIENT)
Dept: RADIOLOGY | Facility: MEDICAL CENTER | Age: 72
End: 2017-06-21
Attending: INTERNAL MEDICINE
Payer: MEDICARE

## 2017-06-21 DIAGNOSIS — R79.89 INCREASED PTH LEVEL: ICD-10-CM

## 2017-06-21 PROCEDURE — A9500 TC99M SESTAMIBI: HCPCS

## 2017-06-25 DIAGNOSIS — R79.89 INCREASED PTH LEVEL: ICD-10-CM

## 2017-06-26 RX ORDER — HYDROCHLOROTHIAZIDE 25 MG/1
TABLET ORAL
Qty: 90 TAB | Refills: 0 | OUTPATIENT
Start: 2017-06-26

## 2017-06-28 ENCOUNTER — HOSPITAL ENCOUNTER (OUTPATIENT)
Facility: MEDICAL CENTER | Age: 72
End: 2017-06-28
Attending: INTERNAL MEDICINE
Payer: MEDICARE

## 2017-06-28 DIAGNOSIS — R79.89 INCREASED PTH LEVEL: ICD-10-CM

## 2017-06-28 PROCEDURE — 82340 ASSAY OF CALCIUM IN URINE: CPT

## 2017-06-30 LAB
CALCIUM 24H UR-MCNC: 6.4 MG/DL
CALCIUM 24H UR-MRATE: 181 MG/D
CALCIUM/CREAT 24H UR: 194 MG/G (ref 20–300)
CREAT 24H UR-MCNC: 33 MG/DL
CREAT 24H UR-MRATE: 932 MG/D (ref 500–1400)
SPECIMEN VOL ?TM UR: 2825 ML

## 2017-07-04 ENCOUNTER — PATIENT MESSAGE (OUTPATIENT)
Dept: MEDICAL GROUP | Age: 72
End: 2017-07-04

## 2017-07-04 DIAGNOSIS — I10 ESSENTIAL HYPERTENSION: ICD-10-CM

## 2017-07-05 RX ORDER — HYDROCHLOROTHIAZIDE 25 MG/1
25 TABLET ORAL
Qty: 90 TAB | Refills: 0 | Status: SHIPPED | OUTPATIENT
Start: 2017-07-05 | End: 2017-10-05

## 2017-07-05 NOTE — TELEPHONE ENCOUNTER
From: Martha Ordonez  To: Sabra Arguello M.D.  Sent: 7/4/2017 3:39 PM PDT  Subject: Prescription Question    I need a refill on my HCTz 25 mg. 1 po daily. Please send to [facility] on [address].  Thank you.  Martha Ordonez  [address] [address] [phone]

## 2017-07-07 ENCOUNTER — NON-PROVIDER VISIT (OUTPATIENT)
Dept: CARDIOLOGY | Facility: MEDICAL CENTER | Age: 72
End: 2017-07-07
Payer: MEDICARE

## 2017-07-07 DIAGNOSIS — R00.2 PALPITATIONS: ICD-10-CM

## 2017-07-07 PROCEDURE — 93000 ELECTROCARDIOGRAM COMPLETE: CPT | Performed by: INTERNAL MEDICINE

## 2017-07-12 LAB — EKG IMPRESSION: NORMAL

## 2017-07-21 ENCOUNTER — PATIENT MESSAGE (OUTPATIENT)
Dept: ENDOCRINOLOGY | Facility: MEDICAL CENTER | Age: 72
End: 2017-07-21

## 2017-07-21 DIAGNOSIS — E21.3 HYPERPARATHYROIDISM (HCC): ICD-10-CM

## 2017-07-21 NOTE — TELEPHONE ENCOUNTER
Telephone conversation with patient.    The patient is requesting a second opinion about her thyroid situation. Watchful waiting is not a good solution for her. She needs another opinion. I will refer her to the head of Department of endocrinology at [facility]. She is in agreement.    To Murray M.D.

## 2017-07-26 ENCOUNTER — NON-PROVIDER VISIT (OUTPATIENT)
Dept: CARDIOLOGY | Facility: MEDICAL CENTER | Age: 72
End: 2017-07-26
Payer: MEDICARE

## 2017-07-26 DIAGNOSIS — I49.3 PVC (PREMATURE VENTRICULAR CONTRACTION): ICD-10-CM

## 2017-07-26 DIAGNOSIS — I47.10 SVT (SUPRAVENTRICULAR TACHYCARDIA): ICD-10-CM

## 2017-07-26 DIAGNOSIS — R00.1 SINUS BRADYCARDIA: ICD-10-CM

## 2017-07-26 DIAGNOSIS — I49.1 PREMATURE ATRIAL CONTRACTION: ICD-10-CM

## 2017-07-26 DIAGNOSIS — R00.2 PALPITATIONS: ICD-10-CM

## 2017-07-28 DIAGNOSIS — R00.2 PALPITATIONS: ICD-10-CM

## 2017-07-28 LAB — EKG IMPRESSION: NORMAL

## 2017-07-28 PROCEDURE — 93224 XTRNL ECG REC UP TO 48 HRS: CPT | Performed by: INTERNAL MEDICINE

## 2017-07-31 ENCOUNTER — APPOINTMENT (OUTPATIENT)
Dept: RADIOLOGY | Facility: MEDICAL CENTER | Age: 72
End: 2017-07-31
Attending: FAMILY MEDICINE
Payer: MEDICARE

## 2017-07-31 ENCOUNTER — PATIENT MESSAGE (OUTPATIENT)
Dept: MEDICAL GROUP | Age: 72
End: 2017-07-31

## 2017-08-01 ENCOUNTER — TELEPHONE (OUTPATIENT)
Dept: MEDICAL GROUP | Age: 72
End: 2017-08-01

## 2017-08-01 NOTE — TELEPHONE ENCOUNTER
ESTABLISHED PATIENT PRE-VISIT PLANNING     Note: Patient will not be contacted if there is no indication to call.     1.  Reviewed notes from the last few office visits within the medical group: Yes    2.  If any orders were placed at last visit or intended to be done for this visit (i.e. 6 mos follow-up), do we have Results/Consult Notes?        •  Labs - Labs ordered, NOT completed. Patient advised to complete prior to next appointment.       •  Imaging - Imaging was not ordered at last office visit.       •  Referrals - No referrals were ordered at last office visit.    3. Is this appointment scheduled as a Hospital Follow-Up? No    4.  Immunizations were updated in Epic using WebIZ?: Epic matches WebIZ       •  Web Iz Recommendations: FLU and TD    5.  Patient is due for the following Health Maintenance Topics:   Health Maintenance Due   Topic Date Due   • MAMMOGRAM  06/21/2017           6.  Patient was informed to arrive 15 min prior to their scheduled appointment and bring in their medication bottles.

## 2017-08-01 NOTE — TELEPHONE ENCOUNTER
From: Martha Ordonez  To: Sabra Arguello M.D.  Sent: 7/31/2017 6:15 PM PDT  Subject: Test Result Question    Dr. Arguello,  I repeated the Holtor on Wednesday and turned it in on last Thursday. They assured me you would receive the report by my appt with you on Wed, Aug 2. If you don't get the results, I probably should reschedule. I did not have the blood lipids drawn since I was unable to tolerate the statin.  Please advise.  Thank you.  Martha Ordonez

## 2017-08-02 ENCOUNTER — OFFICE VISIT (OUTPATIENT)
Dept: MEDICAL GROUP | Age: 72
End: 2017-08-02
Payer: MEDICARE

## 2017-08-02 VITALS
BODY MASS INDEX: 29.01 KG/M2 | HEIGHT: 66 IN | HEART RATE: 76 BPM | TEMPERATURE: 98.2 F | WEIGHT: 180.5 LBS | SYSTOLIC BLOOD PRESSURE: 124 MMHG | OXYGEN SATURATION: 95 % | DIASTOLIC BLOOD PRESSURE: 88 MMHG

## 2017-08-02 DIAGNOSIS — R79.89 INCREASED PTH LEVEL: ICD-10-CM

## 2017-08-02 DIAGNOSIS — R00.2 PALPITATIONS: ICD-10-CM

## 2017-08-02 DIAGNOSIS — I49.1 PAC (PREMATURE ATRIAL CONTRACTION): ICD-10-CM

## 2017-08-02 DIAGNOSIS — R06.83 SNORING: ICD-10-CM

## 2017-08-02 DIAGNOSIS — J45.909 MILD ASTHMA: ICD-10-CM

## 2017-08-02 DIAGNOSIS — R00.1 BRADYCARDIA: ICD-10-CM

## 2017-08-02 DIAGNOSIS — R06.02 SHORT OF BREATH ON EXERTION: ICD-10-CM

## 2017-08-02 PROCEDURE — 99214 OFFICE O/P EST MOD 30 MIN: CPT | Mod: 25 | Performed by: FAMILY MEDICINE

## 2017-08-02 PROCEDURE — 94010 BREATHING CAPACITY TEST: CPT | Performed by: FAMILY MEDICINE

## 2017-08-02 NOTE — PROGRESS NOTES
SUBJECTIVE:        Chief Complaint   Patient presents with   • Palpitations     EKG and Holter monitor results       HPI:     Martha Ordonez is a 72 y.o. female here for follow up of palpitations.   Had to do a repeat holter monitor test and did not notice symptoms on this repeat test.   States she tends to be short of breath a lot. Has an inhaler to use as needed. Notices if she is running around some.   She has mild asthma.   Had a chest CT with scarring followed in past with a prior provider.   She does snore at night.     Elevated PTH levels- has been followed by endocrinology. Will be getting a second opinion. Has been taking vitamin D- last levels were normal.     Interpretive Statements   SINUS BRADYCARDIA   SHORT DE INTERVAL, ACCELERATED AV CONDUCTION   NONSPECIFIC REPOL ABNORMALITY, DIFFUSE LEADS   No previous ECG available for comparison     Interpretive Statements   *  Monitoring started at 1:53 PM and continued for 23 hours 52 minutes.   Cardiac rhythm is Sinus. The average heart rate   was 66 BPM. The minimum heart rate was 43 BPM, occurring at 8:05:09 AM.   The maximum heart rate was 114 BPM,   occurring at 12:07:00 PM. The longest R-R interval was 1.6 seconds   occurring at 8:05:09 AM.   *  Ventricular ectopic activity consisted of one multifocal couplet, 101   multifocal single PVCs.   *  The patient's rhythm included 11 hours 53 seconds of sinus bradycardia.   The slowest single episode occurred at   8:01:12 AM, lasting 23 minutes 33 seconds, with minimum heart rate of 43   BPM.   *  The patient's rhythm also included 13 minutes 19 seconds of sinus   tachycardia.   *  Supraventricular ectopic activity consisted of 14 atrial runs in which the   longest run occurred at 1:56:05 PM consisting   of seven beats, with maximum heart rate of 169 BPM. The fastest run   occurred at 4:29:28 PM, consisting of five beats,   with maximum heart rate of 182 BPM, 17 atrial couplets, 239 single PACs.   *  Diary  entries - no symptoms listed in diary.     Summary: Short runs of supraventricular tachycardia of unlikely clinical   significance       ROS:  Denies any recent fevers or chills. No nausea or vomiting. No diarrhea. No chest pains or shortness of breath. No lower extremity edema.    Current Outpatient Prescriptions on File Prior to Visit   Medication Sig Dispense Refill   • hydrochlorothiazide (HYDRODIURIL) 25 MG Tab Take 1 Tab by mouth every day. TAKE 1 TAB BY MOUTH EVERY DAY. 90 Tab 0   • sertraline (ZOLOFT) 25 MG tablet Take 1 Tab by mouth every day. 90 Tab 2   • albuterol (VENTOLIN OR PROVENTIL) 108 (90 BASE) MCG/ACT Aero Soln inhalation aerosol Inhale 2 Puffs by mouth every 6 hours as needed. 8.5 g 3   • fluticasone (FLONASE) 50 MCG/ACT nasal spray Spray 2 Sprays in nose every day. 16 g 0   • Multiple Vitamins-Minerals (CENTRUM SILVER ULTRA WOMENS PO) Take  by mouth.     • Cholecalciferol (VITAMIN D-3) 1000 UNITS CAPS Take 5,000 Units by mouth.     • docusate sodium (COLACE) 100 MG CAPS Take 100 mg by mouth every day.     • cetirizine (ZYRTEC) 10 MG TABS Take 10 mg by mouth every day.     • aspirin EC (ECOTRIN) 81 MG TBEC Take 81 mg by mouth every day.     • pravastatin (PRAVACHOL) 10 MG Tab Take 1 Tab by mouth every day. (Patient not taking: Reported on 8/2/2017) 30 Tab 3   • Famotidine-Ca Carb-Mag Hydrox (PEPCID COMPLETE PO) Take  by mouth.     • Calcium-Vitamin D (CALTRATE 600 PLUS-VIT D PO) Take  by mouth.     • Cyanocobalamin (VITAMIN B-12) 5000 MCG SUBL Place  under tongue.     • DHA-EPA-Flaxseed Oil-Vitamin E (THERA TEARS NUTRITION PO) Take  by mouth.       No current facility-administered medications on file prior to visit.       Allergies   Allergen Reactions   • Ace Inhibitors      Cough   • Ceclor [Cefaclor] Hives   • Ceftin Hives   • Cephalosporins Rash   • Ciprofloxacin Rash   • Kenalog Hives   • Lamisil [Terbinafine Hcl]    • Latex Rash   • Merthiolate [Thimerosal] Hives   • Monistat [Tioconazole]  "Itching   • Pcn [Penicillins] Hives       Patient Active Problem List    Diagnosis Date Noted   • Palpitations 06/06/2017   • Elevated alkaline phosphatase level 06/29/2016   • Increased PTH level 05/04/2016   • Vitamin D insufficiency 05/04/2016   • Environmental allergies 02/21/2016   • OCD (obsessive compulsive disorder)    • Family history of melanoma 02/12/2016   • Fatigue 12/31/2015   • Mild asthma 09/10/2014   • Hyperlipidemia 09/10/2014   • HTN (hypertension) 09/10/2014   • Anxiety 09/10/2014   • Transient global amnesia 09/10/2014   • GERD (gastroesophageal reflux disease) 09/10/2014       Past Medical History   Diagnosis Date   • GERD (gastroesophageal reflux disease)    • Pancreatitis    • S/P ERCP    • Transient global amnesia    • PCOS (polycystic ovarian syndrome)    • Family history of melanoma    • Asthma    • OCD (obsessive compulsive disorder)    • Anxiety    • Glaucoma suspect of both eyes    • Basal cell carcinoma      squamos cell right ear   • Elevated liver enzymes 9/10/2014   • Abnormal thyroid function test 9/10/2014       BJECTIVE:   /88 mmHg  Pulse 76  Temp(Src) 36.8 °C (98.2 °F)  Ht 1.676 m (5' 6\")  Wt 81.874 kg (180 lb 8 oz)  BMI 29.15 kg/m2  SpO2 95%  General: Well-developed well-nourished female, no acute distress  HEENT: oropharynx clear, small posterior oropharynx, large soft palate  Neck: supple, no lymphadenopathy- cervical or supraclavicular, no thyromegaly  Cardiovascular: regular rate and rhythm, no murmurs, gallops, rubs  Lungs: clear to auscultation bilaterally, no wheezes, crackles, or rhonchi  Abdomen: +bowel sounds, soft, nontender, nondistended, no rebound, no guarding, no hepatosplenomegaly  Extremities: no cyanosis, clubbing, edema  Skin: Warm and dry  Psych: appropriate mood and affect    Spirometry: normal range.  FVC 3.00, FEV1 2.48, FEV1/FVC 82.5    ASSESSMENT/PLAN:    72 y.o.female with multiple medical issues.     1. Palpitations- stable. No symptoms " with recent holter monitor.  She has discussed possible beta blocker therapy in past, but HR noted down to 43 on recent holter monitor. Continue to monitor at this time.    2. PAC (premature atrial contraction) - stable. As above.     3. Short of breath on exertion- normal office spirometry. Will evaluate further.   NM-CARDIAC STRESS TEST    DX-CHEST-2 VIEWS   4. Snoring- obtain overnight oximetry test.      5. Mild asthma - stable. Continue current medication.     6. Increased PTH level - she will schedule for a 2nd opinion with endocrinology.     7.      Bradycardia- noted on holter monitor. Will continue to monitor.   Return in about 3 weeks (around 8/23/2017).    This medical record contains text that has been entered with the assistance of computer voice recognition and dictation software.  Therefore, it may contain unintended errors in text, spelling, punctuation, or grammar.

## 2017-08-02 NOTE — MR AVS SNAPSHOT
"        Martha Boateng Mery   2017 2:00 PM   Office Visit   MRN: [id number]    Department:  [address] [place] [address]   Dept Phone:  [phone]    Description:  Female : 1945   Provider:  Sabra Arguello M.D.           Reason for Visit     Palpitations EKG and Holter monitor results      Allergies as of 2017     Allergen Noted Reactions    Ace Inhibitors 2013       Cough    Ceclor [Cefaclor] 2013   Hives    Ceftin 2013   Hives    Cephalosporins 2013   Rash    Ciprofloxacin 2013   Rash    Kenalog 2016   Hives    Lamisil [Terbinafine Hcl] 2013       Latex 2013   Rash    Merthiolate [Thimerosal] 2013   Hives    Monistat [Tioconazole] 2013   Itching    Pcn [Penicillins] 2013   Hives      You were diagnosed with     Palpitations   [785.1.ICD-9-CM]       PAC (premature atrial contraction)   [776176]       Short of breath on exertion   [471590]       Snoring   [808730]       Mild asthma   [176178]       Increased PTH level   [726441]         Vital Signs     Blood Pressure Pulse Temperature Height Weight Body Mass Index    124/88 mmHg 76 36.8 °C (98.2 °F) 1.676 m (5' 6\") 81.874 kg (180 lb 8 oz) 29.15 kg/m2    Oxygen Saturation Smoking Status                95% Never Smoker           Basic Information     Date Of Birth Sex Race Ethnicity Preferred Language    1945 Female White Non- English      Your appointments     Aug 16, 2017 11:00 AM   MA [id number] with [facility] MG 3   [facility] ( [address])    [address]  [address]  [facility] [id number] [phone]           No deodorant, powder, perfume or lotion under the arm or breast area.            Aug 30, 2017  3:00 PM   Established Patient with Sabra Arguello M.D.   [address]    [address]  [facility] [id number] [phone]           You will be receiving a confirmation call a few days before your appointment from our automated " call confirmation system.            Oct 18, 2017  2:20 PM   Established Patient with To Murray M.D.   [facility] ([facility])    [address], [address]  [facility] [address] [phone]           You will be receiving a confirmation call a few days before your appointment from our automated call confirmation system.              Problem List              ICD-10-CM Priority Class Noted - Resolved    Mild asthma J45.998   9/10/2014 - Present    Hyperlipidemia E78.5   9/10/2014 - Present    HTN (hypertension) I10   9/10/2014 - Present    Anxiety F41.9   9/10/2014 - Present    Transient global amnesia G45.4   9/10/2014 - Present    GERD (gastroesophageal reflux disease) K21.9   9/10/2014 - Present    Fatigue R53.83   12/31/2015 - Present    Family history of melanoma Z80.8   2/12/2016 - Present    OCD (obsessive compulsive disorder) F42.9   Unknown - Present    Environmental allergies Z91.09   2/21/2016 - Present    Increased PTH level E34.9   5/4/2016 - Present    Vitamin D insufficiency E55.9   5/4/2016 - Present    Elevated alkaline phosphatase level R74.8   6/29/2016 - Present    Palpitations R00.2   6/6/2017 - Present      Health Maintenance        Date Due Completion Dates    MAMMOGRAM 6/21/2017 6/21/2016, 5/29/2015    IMM INFLUENZA (1) 9/1/2017 10/25/2016, 11/13/2015    COLONOSCOPY 5/9/2018 5/9/2013    IMM DTaP/Tdap/Td Vaccine (2 - Td) 12/1/2020 12/1/2010    BONE DENSITY 3/2/2021 3/2/2016, 4/1/2013, 2/21/2010, 11/12/2007            Current Immunizations     13-VALENT PCV PREVNAR 11/18/2015    Influenza TIV (IM) 10/25/2016    Influenza Vaccine Quad Inj (Pf) 11/13/2015    Pneumococcal polysaccharide vaccine (PPSV-23) 4/1/2013    SHINGLES VACCINE 11/25/2011    Tdap Vaccine 12/1/2010      Below and/or attached are the medications your provider expects you to take. Review all of your home medications and newly ordered medications with your provider and/or pharmacist.  Follow medication instructions as directed by your provider and/or pharmacist. Please keep your medication list with you and share with your provider. Update the information when medications are discontinued, doses are changed, or new medications (including over-the-counter products) are added; and carry medication information at all times in the event of emergency situations     Allergies:  ACE INHIBITORS - (reactions not documented)     CECLOR - Hives     CEFTIN - Hives     CEPHALOSPORINS - Rash     CIPROFLOXACIN - Rash     KENALOG - Hives     LAMISIL - (reactions not documented)     LATEX - Rash     MERTHIOLATE - Hives     MONISTAT - Itching     PCN - Hives               Medications  Valid as of: August 02, 2017 -  2:51 PM    Generic Name Brand Name Tablet Size Instructions for use    Albuterol Sulfate (Aero Soln) albuterol 108 (90 BASE) MCG/ACT Inhale 2 Puffs by mouth every 6 hours as needed.        Aspirin (Tablet Delayed Response) ECOTRIN 81 MG Take 81 mg by mouth every day.        Calcium-Vitamin D   Take  by mouth.        Cetirizine HCl (Tab) ZYRTEC 10 MG Take 10 mg by mouth every day.        Cholecalciferol (Cap) Vitamin D-3 1000 UNITS Take 5,000 Units by mouth.        Cyanocobalamin (SL Tab) Vitamin B-12 5000 MCG Place  under tongue.        DHA-EPA-Flaxseed Oil-Vitamin E   Take  by mouth.        Docusate Sodium (Cap) COLACE 100 MG Take 100 mg by mouth every day.        Famotidine-Ca Carb-Mag Hydrox   Take  by mouth.        Fluticasone Propionate (Suspension) FLONASE 50 MCG/ACT Spray 2 Sprays in nose every day.        HydroCHLOROthiazide (Tab) HYDRODIURIL 25 MG Take 1 Tab by mouth every day. TAKE 1 TAB BY MOUTH EVERY DAY.        Multiple Vitamins-Minerals   Take  by mouth.        Pravastatin Sodium (Tab) PRAVACHOL 10 MG Take 1 Tab by mouth every day.        Sertraline HCl (Tab) ZOLOFT 25 MG Take 1 Tab by mouth every day.        .                 Medicines prescribed today were sent to:     [id number] - [place], [place] - [address]    [address] [place] [address]    Phone: [phone] Fax: [phone]    Open 24 Hours?: No      Medication refill instructions:       If your prescription bottle indicates you have medication refills left, it is not necessary to call your provider’s office. Please contact your pharmacy and they will refill your medication.    If your prescription bottle indicates you do not have any refills left, you may request refills at any time through one of the following ways: The online [email or web address] system (except Urgent Care), by calling your provider’s office, or by asking your pharmacy to contact your provider’s office with a refill request. Medication refills are processed only during regular business hours and may not be available until the next business day. Your provider may request additional information or to have a follow-up visit with you prior to refilling your medication.   *Please Note: Medication refills are assigned a new Rx number when refilled electronically. Your pharmacy may indicate that no refills were authorized even though a new prescription for the same medication is available at the pharmacy. Please request the medicine by name with the pharmacy before contacting your provider for a refill.        Your To Do List     Future Labs/Procedures Complete By Expires    DX-CHEST-2 VIEWS  As directed 8/2/2018    NM-CARDIAC STRESS TEST  As directed 8/2/2018         [email or web address] Access Code: Activation code not generated  Current [email or web address] Status: Active

## 2017-08-03 ENCOUNTER — HOSPITAL ENCOUNTER (OUTPATIENT)
Dept: RADIOLOGY | Facility: MEDICAL CENTER | Age: 72
End: 2017-08-03
Attending: FAMILY MEDICINE
Payer: MEDICARE

## 2017-08-03 DIAGNOSIS — R06.02 SHORT OF BREATH ON EXERTION: ICD-10-CM

## 2017-08-03 PROCEDURE — 71020 DX-CHEST-2 VIEWS: CPT

## 2017-08-10 ENCOUNTER — HOSPITAL ENCOUNTER (OUTPATIENT)
Dept: RADIOLOGY | Facility: MEDICAL CENTER | Age: 72
End: 2017-08-10
Attending: FAMILY MEDICINE
Payer: MEDICARE

## 2017-08-10 DIAGNOSIS — R06.02 SHORT OF BREATH ON EXERTION: ICD-10-CM

## 2017-08-10 PROCEDURE — A9502 TC99M TETROFOSMIN: HCPCS

## 2017-08-16 ENCOUNTER — APPOINTMENT (OUTPATIENT)
Dept: RADIOLOGY | Facility: MEDICAL CENTER | Age: 72
End: 2017-08-16
Attending: FAMILY MEDICINE
Payer: MEDICARE

## 2017-08-25 RX ORDER — PINDOLOL 5 MG/1
5 TABLET ORAL 2 TIMES DAILY
Qty: 60 TAB | Refills: 3 | Status: SHIPPED | OUTPATIENT
Start: 2017-08-25 | End: 2017-08-30

## 2017-08-29 ENCOUNTER — TELEPHONE (OUTPATIENT)
Dept: MEDICAL GROUP | Age: 72
End: 2017-08-29

## 2017-08-29 NOTE — TELEPHONE ENCOUNTER
ESTABLISHED PATIENT PRE-VISIT PLANNING     Note: Patient will not be contacted if there is no indication to call.     1.  Reviewed notes from the last few office visits within the medical group: Yes    2.  If any orders were placed at last visit or intended to be done for this visit (i.e. 6 mos follow-up), do we have Results/Consult Notes?        •  Labs - patient reviewing stress test       •  Imaging - Imaging was not ordered at last office visit.       •  Referrals - No referrals were ordered at last office visit.    3. Is this appointment scheduled as a Hospital Follow-Up? No    4.  Immunizations were updated in Epic using WebIZ?: Epic matches WebIZ       •  Web Iz Recommendations: FLU, TDAP and ZOSTAVAX (Shingles)    5.  Patient is due for the following Health Maintenance Topics:   Health Maintenance Due   Topic Date Due   • MAMMOGRAM  06/21/2017   • IMM INFLUENZA (1) 09/01/2017           6.  Patient was NOT informed to arrive 15 min prior to their scheduled appointment and bring in their medication bottles.

## 2017-08-30 ENCOUNTER — OFFICE VISIT (OUTPATIENT)
Dept: MEDICAL GROUP | Age: 72
End: 2017-08-30
Payer: MEDICARE

## 2017-08-30 VITALS
DIASTOLIC BLOOD PRESSURE: 62 MMHG | SYSTOLIC BLOOD PRESSURE: 118 MMHG | HEART RATE: 70 BPM | TEMPERATURE: 98.1 F | BODY MASS INDEX: 29.06 KG/M2 | OXYGEN SATURATION: 95 % | WEIGHT: 180.8 LBS | HEIGHT: 66 IN

## 2017-08-30 DIAGNOSIS — J45.909 MILD ASTHMA: ICD-10-CM

## 2017-08-30 DIAGNOSIS — I49.1 PAC (PREMATURE ATRIAL CONTRACTION): ICD-10-CM

## 2017-08-30 DIAGNOSIS — I10 ESSENTIAL HYPERTENSION: ICD-10-CM

## 2017-08-30 DIAGNOSIS — R06.02 SHORTNESS OF BREATH ON EXERTION: ICD-10-CM

## 2017-08-30 DIAGNOSIS — I49.3 PVCS (PREMATURE VENTRICULAR CONTRACTIONS): ICD-10-CM

## 2017-08-30 DIAGNOSIS — R00.1 BRADYCARDIA: ICD-10-CM

## 2017-08-30 DIAGNOSIS — R00.2 PALPITATIONS: ICD-10-CM

## 2017-08-30 DIAGNOSIS — R79.89 INCREASED PTH LEVEL: ICD-10-CM

## 2017-08-30 DIAGNOSIS — Z23 NEED FOR VACCINATION: ICD-10-CM

## 2017-08-30 DIAGNOSIS — K21.9 GASTROESOPHAGEAL REFLUX DISEASE WITHOUT ESOPHAGITIS: ICD-10-CM

## 2017-08-30 DIAGNOSIS — F41.9 ANXIETY: ICD-10-CM

## 2017-08-30 PROCEDURE — G0008 ADMIN INFLUENZA VIRUS VAC: HCPCS | Performed by: FAMILY MEDICINE

## 2017-08-30 PROCEDURE — 90686 IIV4 VACC NO PRSV 0.5 ML IM: CPT | Performed by: FAMILY MEDICINE

## 2017-08-30 PROCEDURE — 99214 OFFICE O/P EST MOD 30 MIN: CPT | Mod: 25 | Performed by: FAMILY MEDICINE

## 2017-08-30 RX ORDER — LOSARTAN POTASSIUM 25 MG/1
25 TABLET ORAL DAILY
Qty: 30 TAB | Refills: 3 | Status: SHIPPED | OUTPATIENT
Start: 2017-08-30 | End: 2017-10-05 | Stop reason: SDUPTHER

## 2017-08-30 RX ORDER — RANITIDINE 150 MG/1
150 TABLET ORAL 2 TIMES DAILY
Qty: 180 TAB | Refills: 0 | Status: SHIPPED | OUTPATIENT
Start: 2017-08-30 | End: 2017-11-26 | Stop reason: SDUPTHER

## 2017-08-30 NOTE — PROGRESS NOTES
SUBJECTIVE:        Chief Complaint   Patient presents with   • Bradycardia     Stress test results       HPI:     Martha Ordonez is a 72 y.o. female here for follow up of of palpitations.   Had prior holter monitor- noted to have PACs, PVCs. Had recent stress test completed, negative for ischemic findings. Was evaluated for shortness of breath on exercise, which has been better. She has mild asthma, otherwise stable.   Had overnight oximetry test with some low levels. Started on oxygen therapy at night. Feels better during day time now. Finds the oxygen therapy is helping.      Hypertension- Off hctz per recommendation of endocrinology. Needs new BP medication.  Has had evaluation of elevated PTH levels. Had prior cough on ace inhibitor. Has not tried ARB therapy. Had trial of pindolol, but HR dropped.   BP is low, but stress increases her BP. /108 previously.  Sometimes gets ankle swelling on occasion.     Has been off zoloft- has been stable. Started when she started going through a divorce.     GERD- needs refill of zantac. Has needed to continue medication therapy. Tolerates medication well.     ROS:  Denies any recent fevers or chills. No nausea or vomiting. No diarrhea. No chest pains or shortness of breath. No lower extremity edema.    Current Outpatient Prescriptions on File Prior to Visit   Medication Sig Dispense Refill   • albuterol (VENTOLIN OR PROVENTIL) 108 (90 BASE) MCG/ACT Aero Soln inhalation aerosol Inhale 2 Puffs by mouth every 6 hours as needed. 8.5 g 3   • fluticasone (FLONASE) 50 MCG/ACT nasal spray Spray 2 Sprays in nose every day. 16 g 0   • Cholecalciferol (VITAMIN D-3) 1000 UNITS CAPS Take 5,000 Units by mouth.     • docusate sodium (COLACE) 100 MG CAPS Take 100 mg by mouth every day.     • cetirizine (ZYRTEC) 10 MG TABS Take 10 mg by mouth every day.     • aspirin EC (ECOTRIN) 81 MG TBEC Take 81 mg by mouth every day.     • hydrochlorothiazide (HYDRODIURIL) 25 MG Tab Take 1 Tab by  mouth every day. TAKE 1 TAB BY MOUTH EVERY DAY. (Patient not taking: Reported on 8/30/2017) 90 Tab 0   • pravastatin (PRAVACHOL) 10 MG Tab Take 1 Tab by mouth every day. (Patient not taking: Reported on 8/2/2017) 30 Tab 3   • sertraline (ZOLOFT) 25 MG tablet Take 1 Tab by mouth every day. (Patient not taking: Reported on 8/30/2017) 90 Tab 2   • Famotidine-Ca Carb-Mag Hydrox (PEPCID COMPLETE PO) Take  by mouth.     • Multiple Vitamins-Minerals (CENTRUM SILVER ULTRA WOMENS PO) Take  by mouth.     • Calcium-Vitamin D (CALTRATE 600 PLUS-VIT D PO) Take  by mouth.     • Cyanocobalamin (VITAMIN B-12) 5000 MCG SUBL Place  under tongue.     • DHA-EPA-Flaxseed Oil-Vitamin E (THERA TEARS NUTRITION PO) Take  by mouth.       No current facility-administered medications on file prior to visit.        Allergies   Allergen Reactions   • Ace Inhibitors      Cough   • Ceclor [Cefaclor] Hives   • Ceftin Hives   • Cephalosporins Rash   • Ciprofloxacin Rash   • Kenalog Hives   • Lamisil [Terbinafine Hcl]    • Latex Rash   • Merthiolate [Thimerosal] Hives   • Monistat [Tioconazole] Itching   • Pcn [Penicillins] Hives       Patient Active Problem List    Diagnosis Date Noted   • PVCs (premature ventricular contractions) 09/04/2017   • PAC (premature atrial contraction) 09/04/2017   • Nocturnal oxygen desaturation 08/31/2017   • Palpitations 06/06/2017   • Elevated alkaline phosphatase level 06/29/2016   • Increased PTH level 05/04/2016   • Vitamin D insufficiency 05/04/2016   • Environmental allergies 02/21/2016   • OCD (obsessive compulsive disorder)    • Family history of melanoma 02/12/2016   • Fatigue 12/31/2015   • Mild asthma 09/10/2014   • Hyperlipidemia 09/10/2014   • HTN (hypertension) 09/10/2014   • Anxiety 09/10/2014   • Transient global amnesia 09/10/2014   • GERD (gastroesophageal reflux disease) 09/10/2014       Past Medical History:   Diagnosis Date   • Elevated liver enzymes 9/10/2014   • Abnormal thyroid function test  "9/10/2014   • Anxiety    • Asthma    • Basal cell carcinoma     squamos cell right ear   • Family history of melanoma    • GERD (gastroesophageal reflux disease)    • Glaucoma suspect of both eyes    • OCD (obsessive compulsive disorder)    • Pancreatitis    • PCOS (polycystic ovarian syndrome)    • S/P ERCP    • Transient global amnesia          OBJECTIVE:   /62   Pulse 70   Temp 36.7 °C (98.1 °F)   Ht 1.676 m (5' 6\")   Wt 82 kg (180 lb 12.8 oz)   SpO2 95%   BMI 29.18 kg/m²   General: Well-developed well-nourished female, no acute distress  Neck: supple, no lymphadenopathy- cervical or supraclavicular, no thyromegaly  Cardiovascular: regular rate and rhythm, no murmurs, gallops, rubs  Lungs: clear to auscultation bilaterally, no wheezes, crackles, or rhonchi  Abdomen: +bowel sounds, soft, nontender, nondistended, no rebound, no guarding, no hepatosplenomegaly  Extremities: no cyanosis, clubbing, edema  Skin: Warm and dry  Psych: appropriate mood and affect    Addenda     Baseline ECG:Normal ECG  Stress ECG: No ischemic T wave changes with peak stress  Impression: Normal stress ECG, nuclear images pending   Signed by Rui Devi M.D. on 8/10/2017  3:42 PM   Narrative & Impression      Myocardial Perfusion   Report   NUCLEAR IMAGING INTERPRETATION   No reversible defects that would indicate ischemia.          ASSESSMENT/PLAN:    72 y.o.female.     1. Palpitations - stable. holter noted PAC and PVCs. Had trial on beta blocker therapy, did not tolerated due to low BP.     2. PAC (premature atrial contraction)- stable.      3. PVCs (premature ventricular contractions)- stable.      4. Bradycardia- noted on holter previously. Will monitor.      5. Shortness of breath on exertion- currently improved.      6. Mild asthma - stable. Continue current medication.     7. Anxiety - stable. Off medication therapy. Monitor.     8. Essential hypertension- off hctz per endocrinology, did not tolerate beta " blocker. Trial on ARB therapy. Reviewed side effects. Monitor.   losartan (COZAAR) 25 MG Tab   9. Increased PTH level- stable. Follow up with endocrinology.      10. Gastroesophageal reflux disease without esophagitis - stable, continue current medication.  ranitidine (ZANTAC) 150 MG Tab   11. Need for vaccination  Flu Quad Inj >3 Year Pre-Filled (Preservative Free)         Return in about 1 month (around 9/30/2017).    This medical record contains text that has been entered with the assistance of computer voice recognition and dictation software.  Therefore, it may contain unintended errors in text, spelling, punctuation, or grammar.

## 2017-08-31 ENCOUNTER — TELEPHONE (OUTPATIENT)
Dept: MEDICAL GROUP | Age: 72
End: 2017-08-31

## 2017-08-31 DIAGNOSIS — G47.34 NOCTURNAL OXYGEN DESATURATION: ICD-10-CM

## 2017-08-31 NOTE — TELEPHONE ENCOUNTER
VOICEMAIL  1. Caller Name: Martha Ordonez                      Call Back Number: [phone] (home)     2. Message: Pt states that she needs a humidifier for her Nocturnal oxygen. Due to this drying her nose out. She would like it sent to [facility]    3. Patient approves office to leave a detailed voicemail/MyChart message: N\A      Form placed at Dr. Arguello's Desk to be signed.

## 2017-09-04 PROBLEM — I49.1 PAC (PREMATURE ATRIAL CONTRACTION): Status: ACTIVE | Noted: 2017-09-04

## 2017-09-04 PROBLEM — I49.3 PVCS (PREMATURE VENTRICULAR CONTRACTIONS): Status: ACTIVE | Noted: 2017-09-04

## 2017-09-11 ENCOUNTER — PATIENT MESSAGE (OUTPATIENT)
Dept: MEDICAL GROUP | Age: 72
End: 2017-09-11

## 2017-09-12 NOTE — TELEPHONE ENCOUNTER
From: Martha Ordonez  To: Sabra Arguello M.D.  Sent: 9/11/2017 5:22 PM PDT  Subject: Non-Urgent Medical Question    Dr. Arguello,  I am using the oxygen every night but am concerned that I am not getting the full benefit. I am a mouth breather because I have a deviated septum and most of the time, I cannot breathe through my nostrils. I did feel better after using the oxygen when I saw you last but now realize it was because I had stopped my zoloft and was then off any BP medication. I have since restarted my Zoloft. I had hoped to stay off of it but with my ex-son-in-law in the picture, don't think I'll ever be able to. Should I be using a mask?  Thank you.  Martha Ordonez

## 2017-09-14 NOTE — TELEPHONE ENCOUNTER
Sophie from [facility] came into the office stating that in order for the patient to have an oxygen, they have to be on 5L or more. If this is what you would like to do then they will need a new order.

## 2017-09-20 ENCOUNTER — TELEPHONE (OUTPATIENT)
Dept: MEDICAL GROUP | Age: 72
End: 2017-09-20

## 2017-09-20 NOTE — TELEPHONE ENCOUNTER
DOCUMENTATION OF PAR STATUS:    1. Name of Medication & Dose: methocarbamol 500 mg    2. Name of Prescription Coverage Company & phone #: [facility]    3. Date Prior Auth Submitted: Today    4. What information was given to obtain insurance decision? no    5. Prior Auth Status? Pending    6. Patient Notified: no

## 2017-09-28 ENCOUNTER — APPOINTMENT (OUTPATIENT)
Dept: MEDICAL GROUP | Age: 72
End: 2017-09-28
Payer: MEDICARE

## 2017-10-02 ENCOUNTER — APPOINTMENT (RX ONLY)
Dept: URBAN - METROPOLITAN AREA CLINIC 31 | Facility: CLINIC | Age: 72
Setting detail: DERMATOLOGY
End: 2017-10-02

## 2017-10-02 DIAGNOSIS — L81.4 OTHER MELANIN HYPERPIGMENTATION: ICD-10-CM

## 2017-10-02 DIAGNOSIS — D22 MELANOCYTIC NEVI: ICD-10-CM

## 2017-10-02 DIAGNOSIS — L82.1 OTHER SEBORRHEIC KERATOSIS: ICD-10-CM

## 2017-10-02 DIAGNOSIS — D18.0 HEMANGIOMA: ICD-10-CM

## 2017-10-02 DIAGNOSIS — L57.0 ACTINIC KERATOSIS: ICD-10-CM

## 2017-10-02 DIAGNOSIS — L20.89 OTHER ATOPIC DERMATITIS: ICD-10-CM

## 2017-10-02 DIAGNOSIS — L57.8 OTHER SKIN CHANGES DUE TO CHRONIC EXPOSURE TO NONIONIZING RADIATION: ICD-10-CM

## 2017-10-02 PROBLEM — J45.909 UNSPECIFIED ASTHMA, UNCOMPLICATED: Status: ACTIVE | Noted: 2017-10-02

## 2017-10-02 PROBLEM — D22.5 MELANOCYTIC NEVI OF TRUNK: Status: ACTIVE | Noted: 2017-10-02

## 2017-10-02 PROBLEM — Z85.828 PERSONAL HISTORY OF OTHER MALIGNANT NEOPLASM OF SKIN: Status: ACTIVE | Noted: 2017-10-02

## 2017-10-02 PROBLEM — D18.01 HEMANGIOMA OF SKIN AND SUBCUTANEOUS TISSUE: Status: ACTIVE | Noted: 2017-10-02

## 2017-10-02 PROBLEM — L20.84 INTRINSIC (ALLERGIC) ECZEMA: Status: ACTIVE | Noted: 2017-10-02

## 2017-10-02 PROBLEM — K21.9 GASTRO-ESOPHAGEAL REFLUX DISEASE WITHOUT ESOPHAGITIS: Status: ACTIVE | Noted: 2017-10-02

## 2017-10-02 PROBLEM — I10 ESSENTIAL (PRIMARY) HYPERTENSION: Status: ACTIVE | Noted: 2017-10-02

## 2017-10-02 PROCEDURE — 17003 DESTRUCT PREMALG LES 2-14: CPT

## 2017-10-02 PROCEDURE — ? COUNSELING

## 2017-10-02 PROCEDURE — 99213 OFFICE O/P EST LOW 20 MIN: CPT | Mod: 25

## 2017-10-02 PROCEDURE — 17000 DESTRUCT PREMALG LESION: CPT

## 2017-10-02 PROCEDURE — ? LIQUID NITROGEN

## 2017-10-02 ASSESSMENT — LOCATION DETAILED DESCRIPTION DERM
LOCATION DETAILED: RIGHT CENTRAL MALAR CHEEK
LOCATION DETAILED: RIGHT INFERIOR LATERAL NECK
LOCATION DETAILED: RIGHT SUPERIOR MEDIAL MIDBACK
LOCATION DETAILED: NASAL DORSUM
LOCATION DETAILED: INFERIOR THORACIC SPINE
LOCATION DETAILED: RIGHT ANTERIOR SHOULDER
LOCATION DETAILED: RIGHT ANTERIOR PROXIMAL UPPER ARM
LOCATION DETAILED: STERNAL NOTCH
LOCATION DETAILED: LEFT DISTAL DORSAL FOREARM
LOCATION DETAILED: RIGHT PROXIMAL DORSAL FOREARM
LOCATION DETAILED: RIGHT SUPERIOR MEDIAL BUCCAL CHEEK
LOCATION DETAILED: LEFT ANTERIOR SHOULDER
LOCATION DETAILED: LEFT SUPERIOR MEDIAL LOWER BACK
LOCATION DETAILED: LEFT SUPERIOR MEDIAL BUCCAL CHEEK
LOCATION DETAILED: LEFT PROXIMAL DORSAL FOREARM

## 2017-10-02 ASSESSMENT — LOCATION SIMPLE DESCRIPTION DERM
LOCATION SIMPLE: LEFT FOREARM
LOCATION SIMPLE: RIGHT UPPER ARM
LOCATION SIMPLE: LEFT CHEEK
LOCATION SIMPLE: LEFT SHOULDER
LOCATION SIMPLE: RIGHT ANTERIOR NECK
LOCATION SIMPLE: UPPER BACK
LOCATION SIMPLE: CHEST
LOCATION SIMPLE: RIGHT SHOULDER
LOCATION SIMPLE: NOSE
LOCATION SIMPLE: LEFT LOWER BACK
LOCATION SIMPLE: RIGHT LOWER BACK
LOCATION SIMPLE: RIGHT CHEEK
LOCATION SIMPLE: RIGHT FOREARM

## 2017-10-02 ASSESSMENT — LOCATION ZONE DERM
LOCATION ZONE: NECK
LOCATION ZONE: NOSE
LOCATION ZONE: TRUNK
LOCATION ZONE: FACE
LOCATION ZONE: ARM

## 2017-10-02 NOTE — PROCEDURE: LIQUID NITROGEN
Consent: The patient's consent was obtained including but not limited to risks of crusting, scabbing, blistering, scarring, darker or lighter pigmentary change, recurrence, incomplete removal and infection.
Post-Care Instructions: I reviewed with the patient in detail post-care instructions. Patient is to wear sunprotection, and avoid picking at any of the treated lesions. Pt may apply Vaseline  or Aquaphor to crusted or scabbing areas.
Duration Of Freeze Thaw-Cycle (Seconds): 0
Detail Level: Detailed
Render Post-Care Instructions In Note?: no

## 2017-10-02 NOTE — HPI: SKIN LESIONS
Is This A New Presentation, Or A Follow-Up?: Skin Lesions
How Severe Is Your Skin Lesion?: mild
Have Your Skin Lesions Been Treated?: not been treated
Additional History: Pt had Bx 3/29/17 on Rt upper cheek, Dx as a AK, mild. Treated with LN2 on 4/27/17. Pt states has resolved.

## 2017-10-03 DIAGNOSIS — J45.909 MILD ASTHMA: ICD-10-CM

## 2017-10-03 RX ORDER — ALBUTEROL SULFATE 90 UG/1
2 AEROSOL, METERED RESPIRATORY (INHALATION) EVERY 6 HOURS PRN
Qty: 18 INHALER | Refills: 3 | Status: SHIPPED | OUTPATIENT
Start: 2017-10-03 | End: 2018-02-22 | Stop reason: SDUPTHER

## 2017-10-04 ENCOUNTER — TELEPHONE (OUTPATIENT)
Dept: MEDICAL GROUP | Age: 72
End: 2017-10-04

## 2017-10-04 NOTE — TELEPHONE ENCOUNTER
ESTABLISHED PATIENT PRE-VISIT PLANNING     Note: Patient will not be contacted if there is no indication to call.     1.  Reviewed notes from the last few office visits within the medical group: Yes    2.  If any orders were placed at last visit or intended to be done for this visit (i.e. 6 mos follow-up), do we have Results/Consult Notes?        •  Labs - Labs were not ordered at last office visit.       •  Imaging - Imaging was not ordered at last office visit.       •  Referrals - No referrals were ordered at last office visit.    3. Is this appointment scheduled as a Hospital Follow-Up? No    4.  Immunizations were updated in [facility] using WebIZ?: Yes       •  Web Iz Recommendations: Patient is up to date on all vaccines    5.  Patient is due for the following Health Maintenance Topics:   Health Maintenance Due   Topic Date Due   • MAMMOGRAM  06/21/2017       - Patient has completed Patient is up to date on all vaccines Immunization(s) per WebIZ. Chart has been updated.      6.  Patient was NOT informed to arrive 15 min prior to their scheduled appointment and bring in their medication bottles.

## 2017-10-05 ENCOUNTER — OFFICE VISIT (OUTPATIENT)
Dept: MEDICAL GROUP | Age: 72
End: 2017-10-05
Payer: MEDICARE

## 2017-10-05 VITALS
BODY MASS INDEX: 29.41 KG/M2 | SYSTOLIC BLOOD PRESSURE: 120 MMHG | WEIGHT: 183 LBS | TEMPERATURE: 98.1 F | HEART RATE: 70 BPM | OXYGEN SATURATION: 96 % | HEIGHT: 66 IN | DIASTOLIC BLOOD PRESSURE: 64 MMHG

## 2017-10-05 DIAGNOSIS — G47.34 NOCTURNAL OXYGEN DESATURATION: ICD-10-CM

## 2017-10-05 DIAGNOSIS — R00.2 PALPITATIONS: ICD-10-CM

## 2017-10-05 DIAGNOSIS — I10 ESSENTIAL HYPERTENSION: ICD-10-CM

## 2017-10-05 DIAGNOSIS — I49.1 PAC (PREMATURE ATRIAL CONTRACTION): ICD-10-CM

## 2017-10-05 DIAGNOSIS — I49.3 PVCS (PREMATURE VENTRICULAR CONTRACTIONS): ICD-10-CM

## 2017-10-05 DIAGNOSIS — R79.89 INCREASED PTH LEVEL: ICD-10-CM

## 2017-10-05 PROCEDURE — 99214 OFFICE O/P EST MOD 30 MIN: CPT | Performed by: FAMILY MEDICINE

## 2017-10-05 RX ORDER — LOSARTAN POTASSIUM 25 MG/1
25 TABLET ORAL DAILY
Qty: 90 TAB | Refills: 1 | Status: SHIPPED | OUTPATIENT
Start: 2017-10-05 | End: 2018-02-22 | Stop reason: SDUPTHER

## 2017-10-05 NOTE — PROGRESS NOTES
SUBJECTIVE:        Chief Complaint   Patient presents with   • Hypertension     BP/FV       HPI:     Martha Ordonez is a 72 y.o. female here for follow up of hypertension.     Hypertension- she has been on losartan 25 mg daily and overall has been doing well.   She has a history of palpitations with PVCs and PACs. She did not tolerate a beta blocker as it seemed to lower her heart rate. Palpitations have been stable.   She has been doing well on the losartan 25 mg daily. First few days felt she had some coughing, which then resolved. No swelling of her ankles.   BP around 120s but sometimes increased with some stressors.   Has had prior echocardiogram and stress test completed per patient.     She will have further evaluation for nocturnal oxygen saturation. She has been using oxygen at night time, but has a difficult time breathing through her nose. She keeps her head elevated.     Increased PTH- 24 hour calcium and creatinine normal which was done recently.  Seeing endocrinology in  currently. She was recommend annual dexa completed.     ROS:  Denies any recent fevers or chills. No nausea or vomiting. No diarrhea. No chest pains or shortness of breath. No lower extremity edema.    Current Outpatient Prescriptions on File Prior to Visit   Medication Sig Dispense Refill   • VENTOLIN  (90 Base) MCG/ACT Aero Soln inhalation aerosol INHALE 2 PUFFS BY MOUTH EVERY 6 HOURS AS NEEDED. 18 Inhaler 3   • methocarbamol (ROBAXIN) 500 MG Tab Take 2 Tabs by mouth 2 times a day as needed. 120 Tab 0   • ranitidine (ZANTAC) 150 MG Tab Take 1 Tab by mouth 2 times a day. TAKE 1 TAB BY MOUTH 2 TIMES A DAY. 180 Tab 0   • sertraline (ZOLOFT) 25 MG tablet Take 1 Tab by mouth every day. 90 Tab 2   • fluticasone (FLONASE) 50 MCG/ACT nasal spray Spray 2 Sprays in nose every day. 16 g 0   • Cholecalciferol (VITAMIN D-3) 1000 UNITS CAPS Take 5,000 Units by mouth.     • docusate sodium (COLACE) 100 MG CAPS Take 100 mg by mouth every  day.     • cetirizine (ZYRTEC) 10 MG TABS Take 10 mg by mouth every day.     • aspirin EC (ECOTRIN) 81 MG TBEC Take 81 mg by mouth every day.     • pravastatin (PRAVACHOL) 10 MG Tab Take 1 Tab by mouth every day. (Patient not taking: Reported on 8/2/2017) 30 Tab 3   • Multiple Vitamins-Minerals (CENTRUM SILVER ULTRA WOMENS PO) Take  by mouth.     • Calcium-Vitamin D (CALTRATE 600 PLUS-VIT D PO) Take  by mouth.     • Cyanocobalamin (VITAMIN B-12) 5000 MCG SUBL Place  under tongue.     • DHA-EPA-Flaxseed Oil-Vitamin E (THERA TEARS NUTRITION PO) Take  by mouth.       No current facility-administered medications on file prior to visit.        Allergies   Allergen Reactions   • Ace Inhibitors      Cough   • Ceclor [Cefaclor] Hives   • Ceftin Hives   • Cephalosporins Rash   • Ciprofloxacin Rash   • Kenalog Hives   • Lamisil [Terbinafine Hcl]    • Latex Rash   • Merthiolate [Thimerosal] Hives   • Monistat [Tioconazole] Itching   • Pcn [Penicillins] Hives       Patient Active Problem List    Diagnosis Date Noted   • PVCs (premature ventricular contractions) 09/04/2017   • PAC (premature atrial contraction) 09/04/2017   • Nocturnal oxygen desaturation 08/31/2017   • Palpitations 06/06/2017   • Elevated alkaline phosphatase level 06/29/2016   • Increased PTH level 05/04/2016   • Vitamin D insufficiency 05/04/2016   • Environmental allergies 02/21/2016   • OCD (obsessive compulsive disorder)    • Family history of melanoma 02/12/2016   • Fatigue 12/31/2015   • Mild asthma 09/10/2014   • Hyperlipidemia 09/10/2014   • HTN (hypertension) 09/10/2014   • Anxiety 09/10/2014   • Transient global amnesia 09/10/2014   • GERD (gastroesophageal reflux disease) 09/10/2014       Past Medical History:   Diagnosis Date   • Elevated liver enzymes 9/10/2014   • Abnormal thyroid function test 9/10/2014   • Anxiety    • Asthma    • Basal cell carcinoma     squamos cell right ear   • Family history of melanoma    • GERD (gastroesophageal reflux  "disease)    • Glaucoma suspect of both eyes    • OCD (obsessive compulsive disorder)    • Pancreatitis    • PCOS (polycystic ovarian syndrome)    • S/P ERCP    • Transient global amnesia          OBJECTIVE:   /64   Pulse 70   Temp 36.7 °C (98.1 °F)   Ht 1.664 m (5' 5.5\")   Wt 83 kg (183 lb)   SpO2 96%   BMI 29.99 kg/m²   General: Well-developed well-nourished female, no acute distress  Neck: supple, no lymphadenopathy- cervical or supraclavicular, no thyromegaly  Cardiovascular: regular rate and rhythm, no murmurs, gallops, rubs  Lungs: clear to auscultation bilaterally, no wheezes, crackles, or rhonchi  Abdomen: +bowel sounds, soft, nontender, nondistended, no rebound, no guarding, no hepatosplenomegaly  Extremities: no cyanosis, clubbing, edema  Skin: Warm and dry  Psych: appropriate mood and affect        ASSESSMENT/PLAN:    72 y.o.female    1. Essential hypertension -controlled, continue current medication. Monitor BP and possible medication side effects.   losartan (COZAAR) 25 MG Tab   2. Nocturnal oxygen desaturation- stable on oxygen therapy. She will have further evaluation.      3. Palpitations - stable. Monitor.     4. PVCs (premature ventricular contractions)- stable.      5. PAC (premature atrial contraction)- stable.      6. Increased PTH level- stable. Followed by endocrinology. Continue to monitor.   Plan for annual dexa scan.          Return in about 3 months (around 1/5/2018).    This medical record contains text that has been entered with the assistance of computer voice recognition and dictation software.  Therefore, it may contain unintended errors in text, spelling, punctuation, or grammar.                          "

## 2017-10-07 ENCOUNTER — HOSPITAL ENCOUNTER (OUTPATIENT)
Dept: RADIOLOGY | Facility: MEDICAL CENTER | Age: 72
End: 2017-10-07
Attending: FAMILY MEDICINE
Payer: MEDICARE

## 2017-10-07 DIAGNOSIS — Z12.31 VISIT FOR SCREENING MAMMOGRAM: ICD-10-CM

## 2017-10-07 PROCEDURE — G0202 SCR MAMMO BI INCL CAD: HCPCS

## 2017-11-10 ENCOUNTER — SLEEP CENTER VISIT (OUTPATIENT)
Dept: SLEEP MEDICINE | Facility: MEDICAL CENTER | Age: 72
End: 2017-11-10
Payer: MEDICARE

## 2017-11-10 VITALS
OXYGEN SATURATION: 96 % | BODY MASS INDEX: 29.99 KG/M2 | HEIGHT: 65 IN | HEART RATE: 58 BPM | DIASTOLIC BLOOD PRESSURE: 86 MMHG | RESPIRATION RATE: 16 BRPM | SYSTOLIC BLOOD PRESSURE: 120 MMHG | WEIGHT: 180 LBS

## 2017-11-10 DIAGNOSIS — I49.3 PVCS (PREMATURE VENTRICULAR CONTRACTIONS): ICD-10-CM

## 2017-11-10 DIAGNOSIS — I10 ESSENTIAL HYPERTENSION: ICD-10-CM

## 2017-11-10 DIAGNOSIS — R06.81 WITNESSED EPISODE OF APNEA: ICD-10-CM

## 2017-11-10 DIAGNOSIS — R06.83 SNORING: ICD-10-CM

## 2017-11-10 DIAGNOSIS — G47.30 SLEEP DISORDER BREATHING: ICD-10-CM

## 2017-11-10 DIAGNOSIS — G25.81 RLS (RESTLESS LEGS SYNDROME): ICD-10-CM

## 2017-11-10 PROCEDURE — 99204 OFFICE O/P NEW MOD 45 MIN: CPT | Performed by: FAMILY MEDICINE

## 2017-11-10 NOTE — PATIENT INSTRUCTIONS
Sleep Apnea   Sleep apnea is a sleep disorder characterized by abnormal pauses in breathing while you sleep. When your breathing pauses, the level of oxygen in your blood decreases. This causes you to move out of deep sleep and into light sleep. As a result, your quality of sleep is poor, and the system that carries your blood throughout your body (cardiovascular system) experiences stress. If sleep apnea remains untreated, the following conditions can develop:  · High blood pressure (hypertension).  · Coronary artery disease.  · Inability to achieve or maintain an erection (impotence).  · Impairment of your thought process (cognitive dysfunction).  There are three types of sleep apnea:  1. Obstructive sleep apnea--Pauses in breathing during sleep because of a blocked airway.  2. Central sleep apnea--Pauses in breathing during sleep because the area of the brain that controls your breathing does not send the correct signals to the muscles that control breathing.  3. Mixed sleep apnea--A combination of both obstructive and central sleep apnea.  RISK FACTORS  The following risk factors can increase your risk of developing sleep apnea:  · Being overweight.  · Smoking.  · Having narrow passages in your nose and throat.  · Being of older age.  · Being male.  · Alcohol use.  · Sedative and tranquilizer use.  · Ethnicity. Among individuals younger than 35 years,  Americans are at increased risk of sleep apnea.  SYMPTOMS   · Difficulty staying asleep.  · Daytime sleepiness and fatigue.  · Loss of energy.  · Irritability.  · Loud, heavy snoring.  · Morning headaches.  · Trouble concentrating.  · Forgetfulness.  · Decreased interest in sex.  · Unexplained sleepiness.  DIAGNOSIS   In order to diagnose sleep apnea, your caregiver will perform a physical examination. A sleep study done in the comfort of your own home may be appropriate if you are otherwise healthy. Your caregiver may also recommend that you spend the  "night in a sleep lab. In the sleep lab, several monitors record information about your heart, lungs, and brain while you sleep. Your leg and arm movements and blood oxygen level are also recorded.  TREATMENT  The following actions may help to resolve mild sleep apnea:  · Sleeping on your side.    · Using a decongestant if you have nasal congestion.    · Avoiding the use of depressants, including alcohol, sedatives, and narcotics.    · Losing weight and modifying your diet if you are overweight.  There also are devices and treatments to help open your airway:  · Oral appliances. These are custom-made mouthpieces that shift your lower jaw forward and slightly open your bite. This opens your airway.  · Devices that create positive airway pressure. This positive pressure \"splints\" your airway open to help you breathe better during sleep. The following devices create positive airway pressure:  ¨ Continuous positive airway pressure (CPAP) device. The CPAP device creates a continuous level of air pressure with an air pump. The air is delivered to your airway through a mask while you sleep. This continuous pressure keeps your airway open.  ¨ Nasal expiratory positive airway pressure (EPAP) device. The EPAP device creates positive air pressure as you exhale. The device consists of single-use valves, which are inserted into each nostril and held in place by adhesive. The valves create very little resistance when you inhale but create much more resistance when you exhale. That increased resistance creates the positive airway pressure. This positive pressure while you exhale keeps your airway open, making it easier to breath when you inhale again.  ¨ Bilevel positive airway pressure (BPAP) device. The BPAP device is used mainly in patients with central sleep apnea. This device is similar to the CPAP device because it also uses an air pump to deliver continuous air pressure through a mask. However, with the BPAP machine, the " pressure is set at two different levels. The pressure when you exhale is lower than the pressure when you inhale.  · Surgery. Typically, surgery is only done if you cannot comply with less invasive treatments or if the less invasive treatments do not improve your condition. Surgery involves removing excess tissue in your airway to create a wider passage way.     This information is not intended to replace advice given to you by your health care provider. Make sure you discuss any questions you have with your health care provider.     Document Released: 12/08/2003 Document Revised: 01/08/2016 Document Reviewed: 04/25/2013  [employer] Interactive Patient Education ©2016 [employer]    Sleep hygiene (ref: UpToDate)  ?Sleep as long as necessary to feel rested (usually seven to eight hours for adults) and then get out of bed  ?Maintain a regular sleep schedule, particularly a regular wake-up time in the morning  ?Try not to force sleep  ?Avoid caffeinated beverages after lunch  ?Avoid alcohol near bedtime (eg, late afternoon and evening)  ?Avoid smoking or other nicotine intake, particularly during the evening  ?Adjust the bedroom environment as needed to decrease stimuli (eg, reduce ambient light, turn off the television or radio)  ?Avoid use of light-emitting screens  (laptops, tablets, smartphones, [employer]) 2 hours before bedtime   ?Resolve concerns or worries before bedtime  ?Exercise regularly for at least 20 minutes, preferably more than four to five hours prior to bedtime.  ?Avoid daytime naps, especially if they are longer than 20 to 30 minutes or occur late in the day    Stimulus control (Ref: UpToDate)    Patients with insomnia may associate their bed and bedroom with the fear of not sleeping or other arousing events, rather than the more pleasurable anticipation of sleep. The longer one stays in bed trying to sleep, the stronger the association becomes. This perpetuates the difficulty falling asleep.Stimulus  control therapy is a strategy whose purpose is to disrupt this association by enhancing the likelihood of sleep . Patients should not go to bed until they are sleepy and should use the bed primarily for sleep (and not for reading, watching television, eating, or worrying). They should not spend more than 20 minutes in bed awake. If they are awake after 20 minutes, they should leave the bedroom and engage in a relaxing activity, such as reading or listening to soothing music. Patients should not engage in activities that stimulate them or reward them for being awake in the middle of the night, such as eating or watching television. In addition, they should not return to bed until they are tired and feel ready to sleep. If they return to bed and still cannot sleep within 20 minutes, the process should be repeated. An alarm should be set to wake the patient at the same time every morning, including weekends. Daytime naps are not allowed.

## 2017-11-10 NOTE — PROGRESS NOTES
"     [facility]  Consult Note     Date: 11/10/2017 / Time: 7:50 PM    Patient ID:   Name:             Martha Ordonez   YOB: 1945  Age:                 72 y.o.  female   MRN:               [id number]      Thank you for requesting a sleep medicine consultation on Martha Ordonez at the sleep center. She presents today with the chief complaints of snoring , witnessed apnea and day time fatiuge and daytime sleepiness for last 2 years . She recently had overnight pulse ox on 8/21/17 which showed O2 kwadwo of 82% and avg O2 saturation 90% with MELITA of 18/hr. Time spent below 89% was 1hr 52 min. She was placed on supplemental oxygen at 2L/min since 9/2017. The supplemental has not improved her symptoms. She is referred by  for evaluation and treatment of sleep disordered breathing.     HISTORY OF PRESENT ILLNESS:       At night,  Martha Ordonez goes to bed around 12 pm on weekdays and around on weekends. She gets out of bed at 9 am on weekdays and on weekends.  She  averages 7 hrs of sleep on a good night and 3-4 hrs on a bad night. Pt has bad nights 4 nights per months. She falls asleep within 32-45 minutes. She awakens 2-3 times a night due to bathroom use and or hip pain . It takes her  few min to fall back asleep. During that time She gets out of bed.  She  does use the bed only for sleeping. Also, during that time  She  thinks about worries about day-to-day problems.       Mery is not aware of snoring/witnessed apneas but denies gasping or choking in sleep.  She   symptoms of restless legs syndrome such as an \"urge to move\"  She  legs in the evening or bedtime. She  denies any symptoms of narcolepsy such as sleep paralysis or cataplexy, or any symptoms to suggest parasomnias such as sleep walking or acting out of dreams.     Overall,  She doesnot finds her sleep refreshing. When She  wakes up in the morning, She  does does feel tired. It has been going for last 2 years. It starts at " bed time that interfere with sleep initiation. It last for about 15-20 min until she gets up and walk around. iit happens 1-2 nights per week. Denies involvement in that hand. [place] sleepiness scale score is normal  at  3/24.   She  Does not take regular naps.       REVIEW OF SYSTEMS:       Constitutional: Denies fevers, Denies weight changes  Eyes: Denies changes in vision, no eye pain  Ears/Nose/Throat/Mouth: Denies nasal congestion or sore throat   Cardiovascular: Denies chest pain or palpitations   Respiratory: Denies shortness of breath , Denies cough  Gastrointestinal/Hepatic: Denies abdominal pain, nausea, vomiting, diarrhea, constipation or GI bleeding   Genitourinary: Denies bladder dysfunction, dysuria or frequency  Musculoskeletal/Rheum: Denies  joint pain and swelling   Skin/Breast: Denies rash, denies breast lumps or discharge  Neurological: Denies headache, confusion, memory loss or focal weakness/parasthesias  Psychiatric: denies mood disorder     Comprehensive review of systems form is reviewed with the patient and is attached in the EMR.     PMH:  has a past medical history of Abnormal thyroid function test (9/10/2014); Allergic rhinitis; Anxiety; Asthma; Back pain; Basal cell carcinoma; Bronchitis; Cardiac arrhythmia; Depression; Elevated liver enzymes (9/10/2014); Family history of melanoma; GERD (gastroesophageal reflux disease); Glaucoma suspect of both eyes; Hyperlipidemia; Hypertension; Nasal drainage; OCD (obsessive compulsive disorder); Pancreatitis; PCOS (polycystic ovarian syndrome); S/P ERCP; and Transient global amnesia.  MEDS:   Current Outpatient Prescriptions:   •  losartan (COZAAR) 25 MG Tab, Take 1 Tab by mouth every day., Disp: 90 Tab, Rfl: 1  •  VENTOLIN  (90 Base) MCG/ACT Aero Soln inhalation aerosol, INHALE 2 PUFFS BY MOUTH EVERY 6 HOURS AS NEEDED., Disp: 18 Inhaler, Rfl: 3  •  methocarbamol (ROBAXIN) 500 MG Tab, Take 2 Tabs by mouth 2 times a day as needed., Disp: 120  Tab, Rfl: 0  •  ranitidine (ZANTAC) 150 MG Tab, Take 1 Tab by mouth 2 times a day. TAKE 1 TAB BY MOUTH 2 TIMES A DAY., Disp: 180 Tab, Rfl: 0  •  sertraline (ZOLOFT) 25 MG tablet, Take 1 Tab by mouth every day., Disp: 90 Tab, Rfl: 2  •  Cholecalciferol (VITAMIN D-3) 1000 UNITS CAPS, Take 5,000 Units by mouth., Disp: , Rfl:   •  cetirizine (ZYRTEC) 10 MG TABS, Take 10 mg by mouth every day., Disp: , Rfl:   •  aspirin EC (ECOTRIN) 81 MG TBEC, Take 81 mg by mouth every day., Disp: , Rfl:   •  pravastatin (PRAVACHOL) 10 MG Tab, Take 1 Tab by mouth every day. (Patient not taking: Reported on 8/2/2017), Disp: 30 Tab, Rfl: 3  •  fluticasone (FLONASE) 50 MCG/ACT nasal spray, Spray 2 Sprays in nose every day., Disp: 16 g, Rfl: 0  ALLERGIES:   Allergies   Allergen Reactions   • Ace Inhibitors      Cough   • Ceclor [Cefaclor] Hives   • Ceftin Hives   • Cephalosporins Rash   • Ciprofloxacin Rash   • Kenalog Hives   • Lamisil [Terbinafine Hcl]    • Latex Rash   • Merthiolate [Thimerosal] Hives   • Monistat [Tioconazole] Itching   • Pcn [Penicillins] Hives     SURGHX:   Past Surgical History:   Procedure Laterality Date   • ABDOMINAL HYSTERECTOMY TOTAL      ovaries remain   • ADENOIDECTOMY     • CARDIAC CATH, RIGHT HEART      normal, EKG was Abnormal    • CHOLECYSTECTOMY     • DILATION AND CURETTAGE     • EYE SURGERY      b/l iridotomy   • HYSTERECTOMY LAPAROSCOPY     • LUMPECTOMY      benign   • TONSILLECTOMY     • US-NEEDLE CORE BX-BREAST PANEL       SOCHX:  reports that she has never smoked. She has never used smokeless tobacco. She reports that she drinks alcohol. She reports that she does not use drugs..   FH:   Family History   Problem Relation Age of Onset   • Hypertension Mother    • Heart Disease Father      CHF   • Cancer Father      melanoma   • Hypertension Sister    • Stroke Sister    • Cancer Paternal Grandfather      colon   • Heart Disease Paternal Grandfather    • Stroke Paternal Grandfather    • Diabetes Paternal  "Grandfather    • Other Maternal Grandmother      eplepsey   • Heart Attack Maternal Grandfather    • Stroke Maternal Grandfather    • Stroke Paternal Grandmother            Physical Exam:  Vitals/ General Appearance:   Weight/BMI: Body mass index is 29.95 kg/m².  Blood pressure 120/86, pulse (!) 58, resp. rate 16, height 1.651 m (5' 5\"), weight 81.6 kg (180 lb), SpO2 96 %.  Vitals:    11/10/17 1003   BP: 120/86   Pulse: (!) 58   Resp: 16   SpO2: 96%   Weight: 81.6 kg (180 lb)   Height: 1.651 m (5' 5\")       Pt. is alert and oriented to time, place and person. Cooperative and in no apparent distress.       1. Head: Atraumatic, normocephalic. There is no mandibular hypoplasia or maxillary over-jut.   2. Ears: Normal tympanic membrane and no discharge  3. Nose: No inferior turbinate hypertophy, no septal deviation, no polyp.   4. Throat: Oropharynx appears crowded in that the palate is overhanging (Malam Mariana scale 3.  She has intact dentition and oral hygiene is fair.  5. Neck: Supple. No thyromegaly  6. Chest: Trachea central, no spine deformity (Scoliosis/Lordosis/Kyphosis)  7. Lungs auscultation: B/L good air entry, vesicular breath sounds, no adventitious sounds  8. Heart auscultation: 1st and 2nd heart sounds normal, regular rhythm. No appreciable murmur.  9. Abdomen: Soft, non tender, no organomegaly. Bowel sounds present  10. Extremities: No, no deformity, no clubbing, no pedal edema.   Peripheral pulses felt.  11. Skin: No rash  12. NEUROLOGICAL EXAMINATION: On neurological exam, the patient was alert and oriented x3. speech was clear and fluent without dysarthria.      INVESTIGATIONS:       ASSESSMENT AND PLAN     1. She  has symptoms of Obstructive Sleep Apnea (VALERIE). She  has excessive daytime sleepiness that interferes with activites of daily living. She  risk factors for VALERIE include crowded oropharynx.     The pathophysiology of VALERIE and the increased risk of cardiovascular morbidity from untreated VALERIE is " discussed in detail with the patient. She  also has HTN, GERD and PVC which can be worsened by her VALERIE.     We have discussed diagnostic options including in-laboratory, attended polysomnography and home sleep testing. We have also discussed treatment options including airway pressurization, reconstructive otolaryngologic surgery, dental appliances and weight management.     Plan   -  She  will be scheduled for an overnight PSG to assess sleep related breathing disorder.     Subsequently,treatment options will be discussed based on the diagnostic study. Meanwhile, She is urged to avoid supine sleep, weight gain and alcoholic beverages since all of these can worsen VALERIE. She is cautioned against drowsy driving. If She feels sleepy while driving, She must pull over for a break/nap, rather than persist on the road, in the interest of She own safety and that of others on the road.    2. Restless Leg syndrome: she has hx of RLS and currently not on medication. It does interfere with sleep onset few times a week however denies issues with sleep maintanance. Denies hx of anemia. RLS could be primary or secondary in nature. RLS can be exacerbated in light of sleep related breathing disorder. Other common cause of RLS is low ferritin level.   Plan    - ferritin labs ordered   - We will reassess RLS symptoms once VALERIE is treated and well controlled. Medication  options were discussed however deferred on this visit.         3. Regarding treatment of other past medical problems and general health maintenance,  She is urged to follow up with PCP.    F/u after PSG

## 2017-11-16 ENCOUNTER — TELEPHONE (OUTPATIENT)
Dept: SLEEP MEDICINE | Facility: MEDICAL CENTER | Age: 72
End: 2017-11-16

## 2017-11-16 ENCOUNTER — HOSPITAL ENCOUNTER (OUTPATIENT)
Dept: RADIOLOGY | Facility: MEDICAL CENTER | Age: 72
End: 2017-11-16
Attending: FAMILY MEDICINE
Payer: MEDICARE

## 2017-11-16 ENCOUNTER — HOSPITAL ENCOUNTER (OUTPATIENT)
Dept: LAB | Facility: MEDICAL CENTER | Age: 72
End: 2017-11-16
Attending: FAMILY MEDICINE
Payer: MEDICARE

## 2017-11-16 DIAGNOSIS — R79.89 INCREASED PTH LEVEL: ICD-10-CM

## 2017-11-16 DIAGNOSIS — Z78.0 POSTMENOPAUSAL: ICD-10-CM

## 2017-11-16 DIAGNOSIS — G25.81 RLS (RESTLESS LEGS SYNDROME): ICD-10-CM

## 2017-11-16 LAB — FERRITIN SERPL-MCNC: 72.4 NG/ML (ref 10–291)

## 2017-11-16 PROCEDURE — 82728 ASSAY OF FERRITIN: CPT

## 2017-11-16 PROCEDURE — 36415 COLL VENOUS BLD VENIPUNCTURE: CPT

## 2017-11-16 PROCEDURE — 77080 DXA BONE DENSITY AXIAL: CPT

## 2017-11-16 NOTE — TELEPHONE ENCOUNTER
The lab called needing a new dx code for her [facility] lab. I gave the lab the DX: Cardiac Arrhythmia from her past medical history. This code passed and they will process order. Pt aware/ap

## 2017-11-26 DIAGNOSIS — K21.9 GASTROESOPHAGEAL REFLUX DISEASE WITHOUT ESOPHAGITIS: ICD-10-CM

## 2017-11-27 ENCOUNTER — APPOINTMENT (OUTPATIENT)
Dept: RADIOLOGY | Facility: MEDICAL CENTER | Age: 72
End: 2017-11-27
Attending: EMERGENCY MEDICINE
Payer: MEDICARE

## 2017-11-27 ENCOUNTER — HOSPITAL ENCOUNTER (EMERGENCY)
Facility: MEDICAL CENTER | Age: 72
End: 2017-11-27
Attending: EMERGENCY MEDICINE
Payer: MEDICARE

## 2017-11-27 VITALS
OXYGEN SATURATION: 95 % | DIASTOLIC BLOOD PRESSURE: 87 MMHG | BODY MASS INDEX: 30.96 KG/M2 | SYSTOLIC BLOOD PRESSURE: 150 MMHG | RESPIRATION RATE: 17 BRPM | HEART RATE: 58 BPM | WEIGHT: 185.85 LBS | HEIGHT: 65 IN

## 2017-11-27 DIAGNOSIS — K20.90 ESOPHAGITIS: ICD-10-CM

## 2017-11-27 LAB
ALBUMIN SERPL BCP-MCNC: 4.2 G/DL (ref 3.2–4.9)
ALBUMIN/GLOB SERPL: 1.4 G/DL
ALP SERPL-CCNC: 112 U/L (ref 30–99)
ALT SERPL-CCNC: 20 U/L (ref 2–50)
ANION GAP SERPL CALC-SCNC: 11 MMOL/L (ref 0–11.9)
AST SERPL-CCNC: 24 U/L (ref 12–45)
BASOPHILS # BLD AUTO: 0.7 % (ref 0–1.8)
BASOPHILS # BLD: 0.04 K/UL (ref 0–0.12)
BILIRUB SERPL-MCNC: 0.8 MG/DL (ref 0.1–1.5)
BUN SERPL-MCNC: 13 MG/DL (ref 8–22)
CALCIUM SERPL-MCNC: 9.9 MG/DL (ref 8.4–10.2)
CHLORIDE SERPL-SCNC: 100 MMOL/L (ref 96–112)
CO2 SERPL-SCNC: 26 MMOL/L (ref 20–33)
CREAT SERPL-MCNC: 0.77 MG/DL (ref 0.5–1.4)
EKG IMPRESSION: NORMAL
EOSINOPHIL # BLD AUTO: 0.18 K/UL (ref 0–0.51)
EOSINOPHIL NFR BLD: 3.1 % (ref 0–6.9)
ERYTHROCYTE [DISTWIDTH] IN BLOOD BY AUTOMATED COUNT: 39.2 FL (ref 35.9–50)
GFR SERPL CREATININE-BSD FRML MDRD: >60 ML/MIN/1.73 M 2
GLOBULIN SER CALC-MCNC: 3 G/DL (ref 1.9–3.5)
GLUCOSE SERPL-MCNC: 110 MG/DL (ref 65–99)
HCT VFR BLD AUTO: 41.6 % (ref 37–47)
HGB BLD-MCNC: 14.2 G/DL (ref 12–16)
IMM GRANULOCYTES # BLD AUTO: 0.01 K/UL (ref 0–0.11)
IMM GRANULOCYTES NFR BLD AUTO: 0.2 % (ref 0–0.9)
LIPASE SERPL-CCNC: 24 U/L (ref 7–58)
LYMPHOCYTES # BLD AUTO: 2.05 K/UL (ref 1–4.8)
LYMPHOCYTES NFR BLD: 35.7 % (ref 22–41)
MCH RBC QN AUTO: 31.8 PG (ref 27–33)
MCHC RBC AUTO-ENTMCNC: 34.1 G/DL (ref 33.6–35)
MCV RBC AUTO: 93.3 FL (ref 81.4–97.8)
MONOCYTES # BLD AUTO: 0.34 K/UL (ref 0–0.85)
MONOCYTES NFR BLD AUTO: 5.9 % (ref 0–13.4)
NEUTROPHILS # BLD AUTO: 3.12 K/UL (ref 2–7.15)
NEUTROPHILS NFR BLD: 54.4 % (ref 44–72)
NRBC # BLD AUTO: 0 K/UL
NRBC BLD AUTO-RTO: 0 /100 WBC
PLATELET # BLD AUTO: 217 K/UL (ref 164–446)
PMV BLD AUTO: 9.7 FL (ref 9–12.9)
POTASSIUM SERPL-SCNC: 3.6 MMOL/L (ref 3.6–5.5)
PROT SERPL-MCNC: 7.2 G/DL (ref 6–8.2)
RBC # BLD AUTO: 4.46 M/UL (ref 4.2–5.4)
SODIUM SERPL-SCNC: 137 MMOL/L (ref 135–145)
TROPONIN I SERPL-MCNC: <0.02 NG/ML (ref 0–0.04)
WBC # BLD AUTO: 5.7 K/UL (ref 4.8–10.8)

## 2017-11-27 PROCEDURE — 93005 ELECTROCARDIOGRAM TRACING: CPT | Performed by: EMERGENCY MEDICINE

## 2017-11-27 PROCEDURE — 93005 ELECTROCARDIOGRAM TRACING: CPT

## 2017-11-27 PROCEDURE — 700102 HCHG RX REV CODE 250 W/ 637 OVERRIDE(OP): Performed by: EMERGENCY MEDICINE

## 2017-11-27 PROCEDURE — 99285 EMERGENCY DEPT VISIT HI MDM: CPT

## 2017-11-27 PROCEDURE — 80053 COMPREHEN METABOLIC PANEL: CPT

## 2017-11-27 PROCEDURE — 71010 DX-CHEST-PORTABLE (1 VIEW): CPT

## 2017-11-27 PROCEDURE — 85025 COMPLETE CBC W/AUTO DIFF WBC: CPT

## 2017-11-27 PROCEDURE — 84484 ASSAY OF TROPONIN QUANT: CPT

## 2017-11-27 PROCEDURE — 700111 HCHG RX REV CODE 636 W/ 250 OVERRIDE (IP): Performed by: EMERGENCY MEDICINE

## 2017-11-27 PROCEDURE — 96374 THER/PROPH/DIAG INJ IV PUSH: CPT

## 2017-11-27 PROCEDURE — 94760 N-INVAS EAR/PLS OXIMETRY 1: CPT

## 2017-11-27 PROCEDURE — 36415 COLL VENOUS BLD VENIPUNCTURE: CPT

## 2017-11-27 PROCEDURE — 83690 ASSAY OF LIPASE: CPT

## 2017-11-27 PROCEDURE — A9270 NON-COVERED ITEM OR SERVICE: HCPCS | Performed by: EMERGENCY MEDICINE

## 2017-11-27 RX ORDER — ONDANSETRON 4 MG/1
4 TABLET, ORALLY DISINTEGRATING ORAL EVERY 8 HOURS PRN
Qty: 20 TAB | Refills: 0 | Status: SHIPPED | OUTPATIENT
Start: 2017-11-27 | End: 2017-11-27

## 2017-11-27 RX ORDER — SUCRALFATE ORAL 1 G/10ML
1 SUSPENSION ORAL
Qty: 280 ML | Refills: 0 | Status: SHIPPED | OUTPATIENT
Start: 2017-11-27 | End: 2017-11-27

## 2017-11-27 RX ORDER — RANITIDINE 150 MG/1
TABLET ORAL
Qty: 180 TAB | Refills: 0 | Status: SHIPPED | OUTPATIENT
Start: 2017-11-27 | End: 2018-02-22 | Stop reason: SDUPTHER

## 2017-11-27 RX ORDER — OMEPRAZOLE 20 MG/1
20 CAPSULE, DELAYED RELEASE ORAL ONCE
Status: COMPLETED | OUTPATIENT
Start: 2017-11-27 | End: 2017-11-27

## 2017-11-27 RX ORDER — OMEPRAZOLE 20 MG/1
20 CAPSULE, DELAYED RELEASE ORAL 2 TIMES DAILY
Qty: 60 CAP | Refills: 0 | Status: SHIPPED | OUTPATIENT
Start: 2017-11-27 | End: 2017-11-27

## 2017-11-27 RX ORDER — OMEPRAZOLE 20 MG/1
20 CAPSULE, DELAYED RELEASE ORAL 2 TIMES DAILY
Qty: 60 CAP | Refills: 0 | Status: SHIPPED | OUTPATIENT
Start: 2017-11-27 | End: 2017-12-27

## 2017-11-27 RX ORDER — SUCRALFATE ORAL 1 G/10ML
1 SUSPENSION ORAL
Qty: 280 ML | Refills: 0 | Status: SHIPPED | OUTPATIENT
Start: 2017-11-27 | End: 2017-12-04

## 2017-11-27 RX ORDER — SUCRALFATE ORAL 1 G/10ML
1 SUSPENSION ORAL ONCE
Status: COMPLETED | OUTPATIENT
Start: 2017-11-27 | End: 2017-11-27

## 2017-11-27 RX ORDER — FLUTICASONE PROPIONATE 50 MCG
1 SPRAY, SUSPENSION (ML) NASAL PRN
Status: SHIPPED | COMMUNITY
End: 2019-01-16

## 2017-11-27 RX ORDER — ONDANSETRON 4 MG/1
4 TABLET, ORALLY DISINTEGRATING ORAL EVERY 8 HOURS PRN
Qty: 20 TAB | Refills: 0 | Status: SHIPPED | OUTPATIENT
Start: 2017-11-27 | End: 2018-05-31

## 2017-11-27 RX ADMIN — FAMOTIDINE 20 MG: 10 INJECTION INTRAVENOUS at 14:31

## 2017-11-27 RX ADMIN — LIDOCAINE HYDROCHLORIDE 30 ML: 20 SOLUTION OROPHARYNGEAL at 14:31

## 2017-11-27 RX ADMIN — SUCRALFATE 1 G: 1 SUSPENSION ORAL at 15:33

## 2017-11-27 RX ADMIN — OMEPRAZOLE 20 MG: 20 CAPSULE, DELAYED RELEASE ORAL at 16:35

## 2017-11-27 ASSESSMENT — PAIN SCALES - GENERAL
PAINLEVEL_OUTOF10: 2
PAINLEVEL_OUTOF10: 2

## 2017-11-27 NOTE — ED PROVIDER NOTES
ED Provider Note    CHIEF COMPLAINT  Chief Complaint   Patient presents with   • Chest Pain         HPI  Martha Ordonez is a 72 y.o. female who presentsTo the ED with chest pain. The patient states over last 4 days she's had central burning chest pain with reflux into her mouth with associated nausea but no vomiting no abdominal pain. She has hypertension but no diabetes, heart attacks. She just had a recent stress test a few months ago that was normal. She's tried Tums and ranitidine without any improvement. No abdominal pains, no radiation, no shortness of breath.    REVIEW OF SYSTEMS  See HPI for further details. All other systems are negative.     PAST MEDICAL HISTORY  Past Medical History:   Diagnosis Date   • Abnormal thyroid function test 9/10/2014   • Allergic rhinitis    • Anxiety    • Asthma    • Back pain    • Basal cell carcinoma     squamos cell right ear   • Bronchitis    • Cardiac arrhythmia    • Depression    • Elevated liver enzymes 9/10/2014   • Family history of melanoma    • GERD (gastroesophageal reflux disease)    • Glaucoma suspect of both eyes    • Hyperlipidemia    • Hypertension    • Nasal drainage    • OCD (obsessive compulsive disorder)    • Pancreatitis    • PCOS (polycystic ovarian syndrome)    • S/P ERCP    • Transient global amnesia        FAMILY HISTORY  Family History   Problem Relation Age of Onset   • Hypertension Mother    • Heart Disease Father      CHF   • Cancer Father      melanoma   • Hypertension Sister    • Stroke Sister    • Cancer Paternal Grandfather      colon   • Heart Disease Paternal Grandfather    • Stroke Paternal Grandfather    • Diabetes Paternal Grandfather    • Other Maternal Grandmother      eplepsey   • Heart Attack Maternal Grandfather    • Stroke Maternal Grandfather    • Stroke Paternal Grandmother        SOCIAL HISTORY  Social History     Social History   • Marital status:      Spouse name: N/A   • Number of children: N/A   • Years of education:  "N/A     Occupational History   • RN- retired      Social History Main Topics   • Smoking status: Never Smoker   • Smokeless tobacco: Never Used   • Alcohol use 0.0 oz/week      Comment: glass a wine a week rarely   • Drug use: No   • Sexual activity: Yes     Partners: Male      Comment: , retired RN     Other Topics Concern   • Not on file     Social History Narrative    , 2 children- daughters.        SURGICAL HISTORY  Past Surgical History:   Procedure Laterality Date   • ABDOMINAL HYSTERECTOMY TOTAL      ovaries remain   • ADENOIDECTOMY     • CARDIAC CATH, RIGHT HEART      normal, EKG was Abnormal    • CHOLECYSTECTOMY     • DILATION AND CURETTAGE     • EYE SURGERY      b/l iridotomy   • HYSTERECTOMY LAPAROSCOPY     • LUMPECTOMY      benign   • TONSILLECTOMY     • US-NEEDLE CORE BX-BREAST PANEL         CURRENT MEDICATIONS  Home Medications     Reviewed by Cordell Lima (Pharmacy Tech) on 11/27/17 at 1506  Med List Status: Complete   Medication Last Dose Status   cetirizine (ZYRTEC) 10 MG TABS 11/27/2017 Active   Cholecalciferol (VITAMIN D-3) 1000 UNITS CAPS 11/27/2017 Active   fluticasone (FLONASE) 50 MCG/ACT nasal spray 11/26/2017 Active   losartan (COZAAR) 25 MG Tab 11/27/2017 Active   sertraline (ZOLOFT) 25 MG tablet 11/27/2017 Active   VENTOLIN  (90 Base) MCG/ACT Aero Soln inhalation aerosol 11/23/2017 Active                ALLERGIES  Allergies   Allergen Reactions   • Ace Inhibitors      Cough   • Ceclor [Cefaclor] Hives   • Ceftin Hives   • Cephalosporins Rash   • Ciprofloxacin Rash   • Kenalog Hives   • Lamisil [Terbinafine Hcl]    • Latex Rash   • Merthiolate [Thimerosal] Hives   • Monistat [Tioconazole] Itching   • Pcn [Penicillins] Hives       PHYSICAL EXAM  VITAL SIGNS: /87   Pulse (!) 58   Resp 17   Ht 1.651 m (5' 5\")   Wt 84.3 kg (185 lb 13.6 oz)   SpO2 95%   BMI 30.93 kg/m²   Constitutional: Well developed, Well nourished,Mild distress, Non-toxic appearance. "   HENT: Normocephalic, Atraumatic, Bilateral external ears normal, Oropharynx moist, No oral exudates, Nose normal.   Eyes: PERRLA, EOMI, Conjunctiva normal, No discharge.   Neck: Normal range of motion, No tenderness, Supple, No stridor.   Cardiovascular: Regular rate and rhythm, no murmurs  Thorax & Lungs: Clear to auscultation bilaterally  Abdomen: Bowel sounds normal, Soft, No tenderness, No masses, No pulsatile masses.   Skin: Warm, Dry, No erythema, No rash.   Back: No tenderness, No CVA tenderness.   Extremities: Intact distal pulses, No tenderness, No cyanosis, No clubbing. No edema  Neurologic: Alert & oriented x 3, Normal motor function, Normal sensory function, No focal deficits noted.     Results for orders placed or performed during the hospital encounter of 11/27/17   CBC w/ Differential   Result Value Ref Range    WBC 5.7 4.8 - 10.8 K/uL    RBC 4.46 4.20 - 5.40 M/uL    Hemoglobin 14.2 12.0 - 16.0 g/dL    Hematocrit 41.6 37.0 - 47.0 %    MCV 93.3 81.4 - 97.8 fL    MCH 31.8 27.0 - 33.0 pg    MCHC 34.1 33.6 - 35.0 g/dL    RDW 39.2 35.9 - 50.0 fL    Platelet Count 217 164 - 446 K/uL    MPV 9.7 9.0 - 12.9 fL    Neutrophils-Polys 54.40 44.00 - 72.00 %    Lymphocytes 35.70 22.00 - 41.00 %    Monocytes 5.90 0.00 - 13.40 %    Eosinophils 3.10 0.00 - 6.90 %    Basophils 0.70 0.00 - 1.80 %    Immature Granulocytes 0.20 0.00 - 0.90 %    Nucleated RBC 0.00 /100 WBC    Neutrophils (Absolute) 3.12 2.00 - 7.15 K/uL    Lymphs (Absolute) 2.05 1.00 - 4.80 K/uL    Monos (Absolute) 0.34 0.00 - 0.85 K/uL    Eos (Absolute) 0.18 0.00 - 0.51 K/uL    Baso (Absolute) 0.04 0.00 - 0.12 K/uL    Immature Granulocytes (abs) 0.01 0.00 - 0.11 K/uL    NRBC (Absolute) 0.00 K/uL   Complete Metabolic Panel (CMP)   Result Value Ref Range    Sodium 137 135 - 145 mmol/L    Potassium 3.6 3.6 - 5.5 mmol/L    Chloride 100 96 - 112 mmol/L    Co2 26 20 - 33 mmol/L    Anion Gap 11.0 0.0 - 11.9    Glucose 110 (H) 65 - 99 mg/dL    Bun 13 8 - 22  mg/dL    Creatinine 0.77 0.50 - 1.40 mg/dL    Calcium 9.9 8.4 - 10.2 mg/dL    AST(SGOT) 24 12 - 45 U/L    ALT(SGPT) 20 2 - 50 U/L    Alkaline Phosphatase 112 (H) 30 - 99 U/L    Total Bilirubin 0.8 0.1 - 1.5 mg/dL    Albumin 4.2 3.2 - 4.9 g/dL    Total Protein 7.2 6.0 - 8.2 g/dL    Globulin 3.0 1.9 - 3.5 g/dL    A-G Ratio 1.4 g/dL   Troponin STAT   Result Value Ref Range    Troponin I <0.02 0.00 - 0.04 ng/mL   Lipase   Result Value Ref Range    Lipase 24 7 - 58 U/L   ESTIMATED GFR   Result Value Ref Range    GFR If African American >60 >60 mL/min/1.73 m 2    GFR If Non African American >60 >60 mL/min/1.73 m 2   EKG (ER)   Result Value Ref Range    Report       [facility] Emergency Dept[facility]    Test Date:  2017  Pt Name:    DEB BANGURA                 Department: [facility]  MRN:        [id number]                      [facility]  Gender:     F                            Technician: LUIS  :        1945                   Requested By:ER TRIAGE PROTOCOL  Order #:    [id number]                    Reading MD: BATSHEVA COLLADO MD    Measurements  Intervals                                Axis  Rate:       68                           P:          32  RI:         90                           QRS:        -48  QRSD:       94                           T:          217  QT:         326  QTc:        347    Interpretive Statements  Sinus rhythm  Short RI interval  Left anterior fascicular block  Nonspecific repol abnormality, diffuse leads  Compared to ECG 2017 15:20:20  Left anterior fascicular block now present  Sinus bradycardia no longer present    Electronically Signed On 2017 13:44:36 PST by BATSHEVA COLLADO MD          RADIOLOGY/PROCEDURES  DX-CHEST-PORTABLE (1 VIEW)   Final Result         1. No acute cardiopulmonary abnormalities are identified.            COURSE & MEDICAL DECISION MAKING  Pertinent Labs & Imaging studies reviewed. (See chart for details)  Patient with  burning/reflux type symptomatology, the patient has been having constant pain over the last 4 days, we'll check a troponin, EKG is nonspecific, we'll give the patient a GI cocktail, Pepcid.    Troponin is negative with multiple hours of constant pain ruling out acute myocardial infarction, I believe the patient is likely having reflux, no symptom improvement with GI cocktail, no symptom improvement with Carafate. Discussed the case with Dr. Rankin who is on-call for GI, he has no further recommendations. We'll put the patient on Carafate, a PPI 2 times a day, have the patient follow-up with Dr. Plasencia as an outpatient, have the patient return in the next 1-2 days if not improved, return sooner with worsening symptoms.    FINAL IMPRESSION  1. Esophagitis        Patient referred to primary care provider for blood pressure management     This dictation was created using voice recognition software. The accuracy of the dictation is limited to the abilities of the software. I expect there may be some errors of grammar and possibly content. The nursing notes were reviewed and certain aspects of this information were incorporated into this note.    Electronically signed by: Brad Dickson, 11/27/2017 2:14 PM

## 2017-11-28 ENCOUNTER — PATIENT OUTREACH (OUTPATIENT)
Dept: HEALTH INFORMATION MANAGEMENT | Facility: OTHER | Age: 72
End: 2017-11-28

## 2017-11-28 NOTE — DISCHARGE INSTRUCTIONS
Please follow-up with your primary care provider for blood pressure management.        Esophagitis  Esophagitis is inflammation of the esophagus. It can involve swelling, soreness, and pain in the esophagus. This condition can make it difficult and painful to swallow.  CAUSES   Most causes of esophagitis are not serious. Many different factors can cause esophagitis, including:  · Gastroesophageal reflux disease (GERD). This is when acid from your stomach flows up into the esophagus.  · Recurrent vomiting.  · An allergic-type reaction.  · Certain medicines, especially those that come in large pills.  · Ingestion of harmful chemicals, such as household cleaning products.  · Heavy alcohol use.  · An infection of the esophagus.  · Radiation treatment for cancer.  · Certain diseases such as sarcoidosis, Crohn's disease, and scleroderma. These diseases may cause recurrent esophagitis.  SYMPTOMS   · Trouble swallowing.  · Painful swallowing.  · Chest pain.  · Difficulty breathing.  · Nausea.  · Vomiting.  · Abdominal pain.  DIAGNOSIS   Your caregiver will take your history and do a physical exam. Depending upon what your caregiver finds, certain tests may also be done, including:  · Barium X-ray. You will drink a solution that coats the esophagus, and X-rays will be taken.  · Endoscopy. A lighted tube is put down the esophagus so your caregiver can examine the area.  · Allergy tests. These can sometimes be arranged through follow-up visits.  TREATMENT   Treatment will depend on the cause of your esophagitis. In some cases, steroids or other medicines may be given to help relieve your symptoms or to treat the underlying cause of your condition. Medicines that may be recommended include:  · Viscous lidocaine, to soothe the esophagus.  · Antacids.  · Acid reducers.  · Proton pump inhibitors.  · Antiviral medicines for certain viral infections of the esophagus.  · Antifungal medicines for certain fungal infections of the  esophagus.  · Antibiotic medicines, depending on the cause of the esophagitis.  HOME CARE INSTRUCTIONS   · Avoid foods and drinks that seem to make your symptoms worse.  · Eat small, frequent meals instead of large meals.  · Avoid eating for the 3 hours prior to your bedtime.  · If you have trouble taking pills, use a pill splitter to decrease the size and likelihood of the pill getting stuck or injuring the esophagus on the way down. Drinking water after taking a pill also helps.  · Stop smoking if you smoke.  · Maintain a healthy weight.  · Wear loose-fitting clothing. Do not wear anything tight around your waist that causes pressure on your stomach.  · Raise the head of your bed 6 to 8 inches with wood blocks to help you sleep. Extra pillows will not help.  · Only take over-the-counter or prescription medicines as directed by your caregiver.  SEEK IMMEDIATE MEDICAL CARE IF:  · You have severe chest pain that radiates into your arm, neck, or jaw.  · You feel sweaty, dizzy, or lightheaded.  · You have shortness of breath.  · You vomit blood.  · You have difficulty or pain with swallowing.  · You have bloody or black, tarry stools.  · You have a fever.  · You have a burning sensation in the chest more than 3 times a week for more than 2 weeks.  · You cannot swallow, drink, or eat.  · You drool because you cannot swallow your saliva.  MAKE SURE YOU:  · Understand these instructions.  · Will watch your condition.  · Will get help right away if you are not doing well or get worse.     This information is not intended to replace advice given to you by your health care provider. Make sure you discuss any questions you have with your health care provider.     Document Released: 01/25/2006 Document Revised: 01/08/2016 Document Reviewed: 04/13/2016  [employer] Patient Education ©2016 [employer]

## 2017-12-18 ENCOUNTER — HOSPITAL ENCOUNTER (OUTPATIENT)
Dept: RADIOLOGY | Facility: MEDICAL CENTER | Age: 72
End: 2017-12-18
Attending: INTERNAL MEDICINE
Payer: MEDICARE

## 2017-12-18 DIAGNOSIS — K21.9 GASTROESOPHAGEAL REFLUX DISEASE, ESOPHAGITIS PRESENCE NOT SPECIFIED: ICD-10-CM

## 2017-12-18 DIAGNOSIS — R74.8 ELEVATED ALKALINE PHOSPHATASE LEVEL: ICD-10-CM

## 2017-12-18 DIAGNOSIS — R07.89 OTHER CHEST PAIN: ICD-10-CM

## 2017-12-18 PROCEDURE — 76700 US EXAM ABDOM COMPLETE: CPT

## 2017-12-18 PROCEDURE — 700101 HCHG RX REV CODE 250: Performed by: INTERNAL MEDICINE

## 2017-12-18 PROCEDURE — 700112 HCHG RX REV CODE 229: Performed by: INTERNAL MEDICINE

## 2017-12-18 PROCEDURE — A9270 NON-COVERED ITEM OR SERVICE: HCPCS | Performed by: INTERNAL MEDICINE

## 2017-12-18 PROCEDURE — 74220 X-RAY XM ESOPHAGUS 1CNTRST: CPT

## 2017-12-18 RX ADMIN — ANTACID/ANTIFLATULENT 1 PACKET: 380; 550; 10; 10 GRANULE, EFFERVESCENT ORAL at 13:40

## 2017-12-18 RX ADMIN — BARIUM SULFATE 700 MG: 700 TABLET ORAL at 13:40

## 2017-12-19 ENCOUNTER — PATIENT MESSAGE (OUTPATIENT)
Dept: MEDICAL GROUP | Age: 72
End: 2017-12-19

## 2017-12-19 DIAGNOSIS — F41.9 ANXIETY: ICD-10-CM

## 2017-12-21 RX ORDER — SERTRALINE HYDROCHLORIDE 25 MG/1
25 TABLET, FILM COATED ORAL
Qty: 90 TAB | Refills: 0 | Status: SHIPPED | OUTPATIENT
Start: 2017-12-21 | End: 2018-02-22

## 2018-01-03 ENCOUNTER — APPOINTMENT (OUTPATIENT)
Dept: MEDICAL GROUP | Age: 73
End: 2018-01-03
Payer: MEDICARE

## 2018-01-16 ENCOUNTER — SLEEP STUDY (OUTPATIENT)
Dept: SLEEP MEDICINE | Facility: MEDICAL CENTER | Age: 73
End: 2018-01-16
Payer: MEDICARE

## 2018-01-16 DIAGNOSIS — R06.83 SNORING: ICD-10-CM

## 2018-01-16 DIAGNOSIS — R06.81 WITNESSED EPISODE OF APNEA: ICD-10-CM

## 2018-01-16 DIAGNOSIS — G47.30 SLEEP DISORDER BREATHING: ICD-10-CM

## 2018-01-16 DIAGNOSIS — I10 ESSENTIAL HYPERTENSION: ICD-10-CM

## 2018-01-16 DIAGNOSIS — I49.3 PVCS (PREMATURE VENTRICULAR CONTRACTIONS): ICD-10-CM

## 2018-01-16 PROCEDURE — 95810 POLYSOM 6/> YRS 4/> PARAM: CPT | Performed by: INTERNAL MEDICINE

## 2018-01-17 NOTE — PROCEDURES
Clinical Comments:  The patient underwent a comprehensive polysomnogram using the standard montage for measurement of parameters of sleep, respiratory events, movement abnormalities, heart rate and rhythm. A microphone was used to monitor snoring.        INTERPRETATION:  Testing began at 9:56:20 PM.  The total recording time was 366.8 minutes with a sleep period of 242.2 minutes and the total sleep time was 85.5 minutes with a sleep efficiency of 23.3%.  The sleep latency was 124.6 minutes, and REM latency was N/A minutes.  The patient experienced 20 arousals in total, for an arousal index of 14.0     RESPIRATORY: The patient had 11 apneas in total.  Of these, 2 were obstructive apneas, and 9 were central apneas.  This resulted in an apnea index (AI) of 7.7.  The patient had 6 hypopneas, for a hypopnea index of 4.2.  The overall AHI was 11.9, while the AHI during Stage R sleep was N/A.  AHI while supine was N/A.     OXIMETRY: Oxygen saturation monitoring showed a mean SpO2 of 91.9%, with a minimum oxygen saturation of 83.0%.  Oxygen saturations were less than or = 89% for 1.6 minutes of sleep time.     CARDIAC: The highest heart rate during the recording was 84.0 beats per minute.  The average heart rate during sleep was 59.7 bpm.     LIMB MOVEMENTS: There were a total of 117 PLMs during sleep, of which 5 were PLMs arousals.  This resulted in a PLMS index of 82.1.    The sleep efficiency was very poor 23% with only 85 minutes total sleep time obtained. Sleep architecture showed a lack of stage III and REM sleep with stage 1: 9%, and stage 2:91%, of the night. Sleep latency was prolonged at 125 minutes. There are 1220 arousals with index of 14.    There were a total of 117 leg movements with index of 82. No EKG abnormalities were observed.    Once asleep no significant snoring was noted. There were mild obstructive and central respiratory events noted. The overall AHI was 14.7  events per hour comprised of 29%  hypopneas, 38% obstructive apneas and 33% central apneas. The events were associated with desaturations to a kwadwo of 83%.    Impression:  Insufficient sleep time for accurate diagnosis, however evidence for sleep disordered breathing and possible periodic limb movement disorder.    Recommendations:  Repeat polysomnography with the use of a sleep aid vs home polysomnography.

## 2018-01-29 ENCOUNTER — SLEEP CENTER VISIT (OUTPATIENT)
Dept: SLEEP MEDICINE | Facility: MEDICAL CENTER | Age: 73
End: 2018-01-29
Payer: MEDICARE

## 2018-01-29 VITALS
BODY MASS INDEX: 29.99 KG/M2 | OXYGEN SATURATION: 96 % | DIASTOLIC BLOOD PRESSURE: 90 MMHG | RESPIRATION RATE: 16 BRPM | SYSTOLIC BLOOD PRESSURE: 160 MMHG | WEIGHT: 180 LBS | HEIGHT: 65 IN | HEART RATE: 72 BPM

## 2018-01-29 DIAGNOSIS — I10 ESSENTIAL HYPERTENSION: ICD-10-CM

## 2018-01-29 DIAGNOSIS — G47.33 OSA (OBSTRUCTIVE SLEEP APNEA): ICD-10-CM

## 2018-01-29 DIAGNOSIS — I49.3 PVCS (PREMATURE VENTRICULAR CONTRACTIONS): ICD-10-CM

## 2018-01-29 PROCEDURE — 99214 OFFICE O/P EST MOD 30 MIN: CPT | Performed by: FAMILY MEDICINE

## 2018-01-29 RX ORDER — HYDROCHLOROTHIAZIDE 25 MG/1
25 TABLET ORAL
Refills: 0 | COMMUNITY
Start: 2017-12-24 | End: 2018-02-22

## 2018-01-29 RX ORDER — ALBUTEROL SULFATE 90 UG/1
AEROSOL, METERED RESPIRATORY (INHALATION)
COMMUNITY
End: 2018-02-22

## 2018-01-29 NOTE — PROGRESS NOTES
[facility] Follow Up Note     Date: 1/29/2018 / Time: 1:04 PM    Patient ID:   Name:             Martha Ordonez   YOB: 1945  Age:                 72 y.o.  female   MRN:               [id number]      Thank you for requesting a sleep medicine consultation on Martha Ordonez at the sleep center. She presents today with the chief complaints of VALERIE and pSG follow up.     HISTORY OF PRESENT ILLNESS:       Pt is currently not on PAP therapy. She goes to sleep around 12 pm on weekdays and around on weekends. She gets out of bed at 9 am on weekdays and on weekends.  She  averages 7 hrs of sleep on a good night and 3-4 hrs on a bad night. Pt has bad nights 4 nights per months.  PSG showed mild obstructive and central respiratory events noted. The overall AHI was 14.7  events per hour comprised of 29% hypopneas, 38% obstructive apneas and 33% central apneas. The events were associated with desaturations to a kwadwo of 83%.The sleep efficiency was very poor 23% with only 85 minutes total sleep time obtained.              REVIEW OF SYSTEMS:       Constitutional: Denies fevers, Denies weight changes  Eyes: Denies changes in vision, no eye pain  Ears/Nose/Throat/Mouth: Denies nasal congestion or sore throat   Cardiovascular: Denies chest pain or palpitations   Respiratory: Denies shortness of breath , Denies cough  Gastrointestinal/Hepatic: Denies abdominal pain, nausea, vomiting, diarrhea, constipation or GI bleeding   Genitourinary: Denies bladder dysfunction, dysuria or frequency  Musculoskeletal/Rheum: Denies  joint pain and swelling   Skin/Breast: Denies rash,   Neurological: Denies headache, confusion, memory loss or focal weakness/parasthesias  Psychiatric: denies mood disorder     Comprehensive review of systems form is reviewed with the patient and is attached in the EMR.     PMH:  has a past medical history of Abnormal thyroid function test (9/10/2014); Allergic rhinitis; Anxiety; Asthma;  Back pain; Basal cell carcinoma; Bronchitis; Cardiac arrhythmia; Depression; Elevated liver enzymes (9/10/2014); Family history of melanoma; GERD (gastroesophageal reflux disease); Glaucoma suspect of both eyes; Hyperlipidemia; Hypertension; Nasal drainage; OCD (obsessive compulsive disorder); Pancreatitis; PCOS (polycystic ovarian syndrome); S/P ERCP; and Transient global amnesia.  MEDS:   Current Outpatient Prescriptions:   •  ranitidine (ZANTAC) 150 MG Tab, TAKE 1 TABLET BY MOUTH TWICE A DAY, Disp: 180 Tab, Rfl: 0  •  VENTOLIN  (90 Base) MCG/ACT Aero Soln inhalation aerosol, INHALE 2 PUFFS BY MOUTH EVERY 6 HOURS AS NEEDED., Disp: 18 Inhaler, Rfl: 3  •  Cholecalciferol (VITAMIN D-3) 1000 UNITS CAPS, Take 1,000 Units by mouth every day., Disp: , Rfl:   •  hydrochlorothiazide (HYDRODIURIL) 25 MG Tab, Take 25 mg by mouth every day. TAKE 1 TAB BY MOUTH EVERY DAY., Disp: , Rfl: 0  •  aspirin EC (ECOTRIN) 81 MG Tablet Delayed Response, Take 81 mg by mouth., Disp: , Rfl:   •  albuterol 108 (90 Base) MCG/ACT Aero Soln inhalation aerosol, by Nebulization route., Disp: , Rfl:   •  sertraline (ZOLOFT) 25 MG tablet, Take 1 Tab by mouth every day., Disp: 90 Tab, Rfl: 0  •  fluticasone (FLONASE) 50 MCG/ACT nasal spray, Spray 1 Spray in nose as needed., Disp: , Rfl:   •  ondansetron (ZOFRAN ODT) 4 MG TABLET DISPERSIBLE, Take 1 Tab by mouth every 8 hours as needed., Disp: 20 Tab, Rfl: 0  •  losartan (COZAAR) 25 MG Tab, Take 1 Tab by mouth every day., Disp: 90 Tab, Rfl: 1  •  cetirizine (ZYRTEC) 10 MG TABS, Take 10 mg by mouth every day., Disp: , Rfl:   ALLERGIES:   Allergies   Allergen Reactions   • Ace Inhibitors      Cough   • Ceclor [Cefaclor] Hives   • Ceftin Hives   • Cephalosporins Rash   • Ciprofloxacin Rash   • Kenalog Hives   • Lamisil [Terbinafine Hcl]    • Latex Rash   • Merthiolate [Thimerosal] Hives   • Monistat [Tioconazole] Itching   • Pcn [Penicillins] Hives     SURGHX:   Past Surgical History:   Procedure  "Laterality Date   • ABDOMINAL HYSTERECTOMY TOTAL      ovaries remain   • ADENOIDECTOMY     • CARDIAC CATH, RIGHT HEART      normal, EKG was Abnormal    • CHOLECYSTECTOMY     • DILATION AND CURETTAGE     • EYE SURGERY      b/l iridotomy   • HYSTERECTOMY LAPAROSCOPY     • LUMPECTOMY      benign   • TONSILLECTOMY     • US-NEEDLE CORE BX-BREAST PANEL       SOCHX:  reports that she has never smoked. She has never used smokeless tobacco. She reports that she drinks alcohol. She reports that she does not use drugs..  FH:   Family History   Problem Relation Age of Onset   • Hypertension Mother    • Heart Disease Father      CHF   • Cancer Father      melanoma   • Hypertension Sister    • Stroke Sister    • Sleep Apnea Sister    • Cancer Paternal Grandfather      colon   • Heart Disease Paternal Grandfather    • Stroke Paternal Grandfather    • Diabetes Paternal Grandfather    • Other Maternal Grandmother      eplepsey   • Heart Attack Maternal Grandfather    • Stroke Maternal Grandfather    • Stroke Paternal Grandmother    • Sleep Apnea Sister          Physical Exam:  Vitals/ General Appearance:   Weight/BMI: Body mass index is 29.95 kg/m².  Blood pressure 160/90, pulse 72, resp. rate 16, height 1.651 m (5' 5\"), weight 81.6 kg (180 lb), SpO2 96 %.  Vitals:    01/29/18 1246   BP: 160/90   Pulse: 72   Resp: 16   SpO2: 96%   Weight: 81.6 kg (180 lb)   Height: 1.651 m (5' 5\")       Pt. is alert and oriented to time, place and person. Cooperative and in no apparent distress.       1. Head: Atraumatic, normocephalic.   2. Ears: Normal tympanic membrane and no discharge  3. Nose: No inferior turbinate hypertophy, no septal deviation, no polyp.   4. Throat: Oropharynx appears crowded in that the palate is overhanging  5. Neck: Supple. No thyromegaly  6. Chest: Trachea central, no spine deformity   7. Lungs auscultation: B/L good air entry, vesicular breath sounds, no adventitious sounds  8. Heart auscultation: 1st and 2nd heart " sounds normal, regular rhythm. No appreciable murmur.  9. Abdomen: Soft, non tender, no organomegaly. Bowel sounds present  10. Extremities: No, no deformity, no clubbing, no pedal edema.  11. Skin: No rash  12. NEUROLOGICAL EXAMINATION: On neurological exam, the patient was alert and oriented x3. speech was clear and fluent without dysarthria.      INVESTIGATIONS:           ASSESSMENT AND PLAN     1.Obstructive Sleep Apnea (VALERIE). The symptoms of excessive daytime, snoring and gasping persist.      The pathophysiology of VALERIE and the increased risk of cardiovascular morbidity from untreated VALERIE is discussed in detail with the patient. She  also has HTN and PVC  which can be worsened by her VALERIE.     She is urged to avoid supine sleep, weight gain and alcoholic beverages since all of these can worsen VALERIE. She is cautioned against drowsy driving. If She feels sleepy while driving, She must pull over for a break/nap, rather than persist on the road, in the interest of She own safety and that of others on the road.   Plan   - HST was ordered due to not enough sleep time on the PSG to be a valid study   - PSG was reviewed and discussed with the pt     2. Regarding treatment of other past medical problems and general health maintenance,  She is urged to follow up with PCP.

## 2018-02-15 ENCOUNTER — APPOINTMENT (OUTPATIENT)
Dept: SLEEP MEDICINE | Facility: MEDICAL CENTER | Age: 73
End: 2018-02-15
Attending: FAMILY MEDICINE
Payer: MEDICARE

## 2018-02-21 ENCOUNTER — TELEPHONE (OUTPATIENT)
Dept: MEDICAL GROUP | Age: 73
End: 2018-02-21

## 2018-02-21 RX ORDER — OMEPRAZOLE 20 MG/1
CAPSULE, DELAYED RELEASE ORAL
Refills: 5 | COMMUNITY
Start: 2018-01-28 | End: 2018-05-31

## 2018-02-21 RX ORDER — SUCRALFATE 1 G/10ML
SUSPENSION ORAL
Refills: 0 | COMMUNITY
Start: 2017-11-27 | End: 2018-02-22

## 2018-02-21 NOTE — TELEPHONE ENCOUNTER
Future Appointments       Provider Department Center    2/22/2018 11:00 AM Ruchi Plasencia M.D. [facility] [address]    2/23/2018 2:30 PM SLEEP CLINIC [facility] Sleep [facility]     3/23/2018 3:00 PM Harshad Mason M.D. [facility] Sleep Medicine         ESTABLISHED PATIENT PRE-VISIT PLANNING     Note: Patient will not be contacted if there is no indication to call.     1.  Reviewed notes from the last few office visits within the medical group: Yes    2.  If any orders were placed at last visit or intended to be done for this visit (i.e. 6 mos follow-up), do we have Results/Consult Notes?        •  Labs - Labs ordered, NOT completed. Patient advised to complete prior to next appointment.   Note: If patient appointment is for lab review and patient did not complete labs, check with provider if OK to reschedule patient until labs completed.       •  Imaging - Imaging was not ordered at last office visit.       •  Referrals - scheduled    3. Is this appointment scheduled as a Hospital Follow-Up? No    4.  Immunizations were updated in Epic using WebIZ?: Epic matches WebIZ       •  Web Iz Recommendations: Patient is up to date on all vaccines    5.  Patient is due for the following Health Maintenance Topics:   There are no preventive care reminders to display for this patient.    - Patient is up-to-date on all Health Maintenance topics. No records have been requested at this time.    6.  Patient was NOT informed to arrive 15 min prior to their scheduled appointment and bring in their medication bottles.

## 2018-02-22 ENCOUNTER — OFFICE VISIT (OUTPATIENT)
Dept: MEDICAL GROUP | Age: 73
End: 2018-02-22
Payer: MEDICARE

## 2018-02-22 VITALS
BODY MASS INDEX: 29.99 KG/M2 | SYSTOLIC BLOOD PRESSURE: 110 MMHG | HEIGHT: 65 IN | OXYGEN SATURATION: 96 % | TEMPERATURE: 99 F | WEIGHT: 180 LBS | HEART RATE: 73 BPM | DIASTOLIC BLOOD PRESSURE: 80 MMHG

## 2018-02-22 DIAGNOSIS — K21.9 GASTROESOPHAGEAL REFLUX DISEASE WITHOUT ESOPHAGITIS: ICD-10-CM

## 2018-02-22 DIAGNOSIS — F42.9 OBSESSIVE-COMPULSIVE DISORDER, UNSPECIFIED TYPE: ICD-10-CM

## 2018-02-22 DIAGNOSIS — I10 ESSENTIAL HYPERTENSION: ICD-10-CM

## 2018-02-22 DIAGNOSIS — E78.00 PURE HYPERCHOLESTEROLEMIA: ICD-10-CM

## 2018-02-22 DIAGNOSIS — J45.20 MILD INTERMITTENT ASTHMA WITHOUT COMPLICATION: ICD-10-CM

## 2018-02-22 DIAGNOSIS — R79.89 INCREASED PTH LEVEL: ICD-10-CM

## 2018-02-22 PROCEDURE — 99204 OFFICE O/P NEW MOD 45 MIN: CPT | Performed by: INTERNAL MEDICINE

## 2018-02-22 RX ORDER — CLARITHROMYCIN 125 MG/5ML
125 FOR SUSPENSION ORAL 2 TIMES DAILY
COMMUNITY
End: 2018-02-22

## 2018-02-22 RX ORDER — SIMVASTATIN 10 MG
10 TABLET ORAL EVERY EVENING
Qty: 90 TAB | Refills: 3 | Status: SHIPPED | OUTPATIENT
Start: 2018-02-22 | End: 2019-01-16

## 2018-02-22 RX ORDER — MONTELUKAST SODIUM 10 MG/1
10 TABLET ORAL DAILY
Qty: 30 TAB | Refills: 6 | Status: SHIPPED | OUTPATIENT
Start: 2018-02-22 | End: 2018-03-13 | Stop reason: SDUPTHER

## 2018-02-22 RX ORDER — CLARITHROMYCIN 250 MG/1
250 TABLET, FILM COATED ORAL 2 TIMES DAILY
COMMUNITY
Start: 2018-02-20 | End: 2018-05-31

## 2018-02-22 RX ORDER — ALBUTEROL SULFATE 90 UG/1
2 AEROSOL, METERED RESPIRATORY (INHALATION) EVERY 6 HOURS PRN
Qty: 18 INHALER | Refills: 3 | Status: SHIPPED | OUTPATIENT
Start: 2018-02-22 | End: 2019-05-16 | Stop reason: SDUPTHER

## 2018-02-22 RX ORDER — RANITIDINE 150 MG/1
150 TABLET ORAL 2 TIMES DAILY
Qty: 180 TAB | Refills: 3 | Status: SHIPPED | OUTPATIENT
Start: 2018-02-22 | End: 2019-03-04 | Stop reason: SDUPTHER

## 2018-02-22 RX ORDER — LOSARTAN POTASSIUM 25 MG/1
25 TABLET ORAL DAILY
Qty: 90 TAB | Refills: 3 | Status: SHIPPED | OUTPATIENT
Start: 2018-02-22 | End: 2019-03-04 | Stop reason: SDUPTHER

## 2018-02-22 ASSESSMENT — ACTIVITIES OF DAILY LIVING (ADL): BATHING_REQUIRES_ASSISTANCE: 0

## 2018-02-22 ASSESSMENT — PATIENT HEALTH QUESTIONNAIRE - PHQ9: CLINICAL INTERPRETATION OF PHQ2 SCORE: 0

## 2018-02-23 ENCOUNTER — HOME STUDY (OUTPATIENT)
Dept: SLEEP MEDICINE | Facility: MEDICAL CENTER | Age: 73
End: 2018-02-23
Attending: FAMILY MEDICINE
Payer: MEDICARE

## 2018-02-23 DIAGNOSIS — G47.33 OSA (OBSTRUCTIVE SLEEP APNEA): ICD-10-CM

## 2018-02-23 DIAGNOSIS — I10 ESSENTIAL HYPERTENSION: ICD-10-CM

## 2018-02-23 DIAGNOSIS — I49.3 PVCS (PREMATURE VENTRICULAR CONTRACTIONS): ICD-10-CM

## 2018-02-23 PROCEDURE — G0399 HOME SLEEP TEST/TYPE 3 PORTA: HCPCS | Performed by: FAMILY MEDICINE

## 2018-02-23 NOTE — PROGRESS NOTES
Deb Bangura is a 72 y.o. female here to establish care and the evaluation and management of:      HPI:    Pure hypercholesterolemia  Patient declined to be treated with statin in the past. She tries to control her cholesterol with diet and exercise. Last lipid panel on 5/29/17 showed that cholesterol is not well controlled.  We discussed the risks and benefits of statin treatment again in clinic. I also reviewed previous blood tests with her in clinic. Patient agreed to try simvastatin 10 mg every evening. I advised her to take simvastatin 3 days a week first, then she can increase to 5 days a week and then 7 days a week, slowly if she tolerates.    Results for DEB BANGURA (MRN [id number]) as of 2/21/2018 20:40   Ref. Range 9/15/2014 11:19 1/4/2016 11:05 5/29/2017 10:57   Cholesterol,Tot Latest Ref Range: 100 - 199 mg/dL 230 (H) 234 (H) 221 (H)   Triglycerides Latest Ref Range: 0 - 149 mg/dL 110 78 124   HDL Latest Ref Range: >=40 mg/dL 73 69 67   LDL Latest Ref Range: <100 mg/dL 135 (H) 149 (H) 129 (H)       Essential hypertension  Patient stated that she was switched from hydrochlorothiazide to losartan by endocrinologist to find out her parathyroid problem related to hydrochlorothiazide. Then she switched back to hydrochlorothiazide as she does not have hypercalcemia or hypercalciuria. She is currently taking hydrochlorothiazide. Her blood pressure is stable with current regimens. She is taking baby aspirin daily. CMP on 11/27/17 showed normal electrolytes and kidney function. I reviewed the risks and benefits of hydrochlorothiazide with patient. I discussed with patient that I prefer to take losartan as hydrochlorothiazide can cause more kidney injury and dehydration. Patient agreed with that and she agreed to retake losartan only. She will take losartan low-dose 25 mg daily. She did not have side effects from taking losartan in the past. She reported having cough from taking ACE inhibitor, but no side  effects from taking losartan.    Mild asthma  Patient is using albuterol inhaler as needed for asthma. Has symptom is well-controlled with current regimens. Patient stated that she is interested to try singular. She had bronchitis and sinusitis recently and was treated with clarithromycin by [facility] clinic 2 days ago. She stated that her symptoms improved with antibiotic. She would like to try singular for her allergy symptoms and asthma. I advised patient to wait to take singular until she completes her antibiotic treatment.    OCD (obsessive compulsive disorder)  She weaned Zoloft 25 mg daily for 3 months and stopped in Dec 2017, as she does not like to take medication. She is able to control obsessive thought and compulsion. She denies side effects from taking Zoloft. She stated that she learned how to let it go. She denied depression. Her anxiety is able to control without mediation. She wants to control her symptoms without pharmacological treatment. She denies suicidal ideation or plan or homicidal ideation or plan.    Increased PTH level  Patient has elevated parathyroid hormone chronically. Her vitamin D level since 2016. Vitamin D level was normal. She has slightly elevated alkaline phosphatase. It is likely primary hyperparathyroidism. She denied history of kidney stone and denied abdominal pain. Patient was evaluated by Dr. Brendan Meier, endocrinologist in [facility] on 10/23/17. Dr. Meier does not recommend for surgery as she does not have any indication for parathyroidectomy. According to [facility] endocrinologist note, patient insisted to seek surgical intervention and she planned to consult Dr. Toney surgeon in [place] for parathyroidectomy. Patient is asymptomatic currently. Last calcium and vitamin D level are normal. Patient will follow with endocrinologist with blood tests in 6 months.    Gastroesophageal reflux disease without esophagitis  Patient is taking Zantac 150 mg twice a day and take Prilosec 20 mg  only as needed. Patient denied side effects from taking Zantac and Prilosec. Her symptoms is well controlled with current regimens.    Current medicines (including changes today)  Current Outpatient Prescriptions   Medication Sig Dispense Refill   • clarithromycin (BIAXIN) 250 MG Tab Take 250 mg by mouth 2 times a day.     • albuterol (VENTOLIN HFA) 108 (90 Base) MCG/ACT Aero Soln inhalation aerosol Inhale 2 Puffs by mouth every 6 hours as needed. 18 Inhaler 3   • montelukast (SINGULAIR) 10 MG Tab Take 1 Tab by mouth every day. 30 Tab 6   • losartan (COZAAR) 25 MG Tab Take 1 Tab by mouth every day. 90 Tab 3   • raNITidine (ZANTAC) 150 MG Tab Take 1 Tab by mouth 2 times a day. 180 Tab 3   • simvastatin (ZOCOR) 10 MG Tab Take 1 Tab by mouth every evening. 90 Tab 3   • omeprazole (PRILOSEC) 20 MG delayed-release capsule TAKE ONE CAPSULE BY MOUTH TWICE A DAY FOR 30 MIN BEFORE BREAKFAST & 30MIN BEFORE DINNER  5   • aspirin EC (ECOTRIN) 81 MG Tablet Delayed Response Take 81 mg by mouth.     • fluticasone (FLONASE) 50 MCG/ACT nasal spray Spray 1 Spray in nose as needed.     • ondansetron (ZOFRAN ODT) 4 MG TABLET DISPERSIBLE Take 1 Tab by mouth every 8 hours as needed. 20 Tab 0   • Cholecalciferol (VITAMIN D-3) 1000 UNITS CAPS Take 1,000 Units by mouth 2 Times a Day.     • cetirizine (ZYRTEC) 10 MG TABS Take 10 mg by mouth every day.       No current facility-administered medications for this visit.      She  has a past medical history of Abnormal thyroid function test (9/10/2014); Allergic rhinitis; Anxiety; Asthma; Back pain; Basal cell carcinoma; Bronchitis; Cardiac arrhythmia; Depression; Elevated liver enzymes (9/10/2014); Family history of melanoma; GERD (gastroesophageal reflux disease); Glaucoma suspect of both eyes; Hyperlipidemia; Hypertension; Nasal drainage; OCD (obsessive compulsive disorder); Pancreatitis; PCOS (polycystic ovarian syndrome); S/P ERCP; and Transient global amnesia.  She  has a past surgical  history that includes cholecystectomy; tonsillectomy; lumpectomy; abdominal hysterectomy total; cardiac cath, right heart; eye surgery; us-needle core bx-breast panel; hysterectomy laparoscopy; dilation and curettage; and adenoidectomy.  Social History   Substance Use Topics   • Smoking status: Never Smoker   • Smokeless tobacco: Never Used   • Alcohol use No     Social History     Social History Narrative    , 2 children- daughters.      Family History   Problem Relation Age of Onset   • Hypertension Mother    • Heart Disease Father      CHF   • Cancer Father      melanoma   • Hypertension Sister    • Stroke Sister    • Sleep Apnea Sister    • Cancer Paternal Grandfather      colon   • Heart Disease Paternal Grandfather    • Stroke Paternal Grandfather    • Diabetes Paternal Grandfather    • Other Maternal Grandmother      eplepsey   • Heart Attack Maternal Grandfather    • Stroke Maternal Grandfather    • Stroke Paternal Grandmother    • Sleep Apnea Sister      Family Status   Relation Status   • Mother Alive   • Father    • Sister    • Paternal Grandfather    • Maternal Grandmother    • Maternal Grandfather    • Paternal Grandmother    • Sister Alive   • Sister Alive   • Sister Alive   • Sister Alive   • Sister Alive     The patient has no Health Maintenance topics of status Overdue, Due On, or Due Soon      ROS    Gen.: Denied weight change, appetite change, fatigue.  ENT: Denied sinus tenderness, nasal congestion, runny nose, or sore throat  CVS: Denied chest pain, palpitations, legs swelling.  Respiratory: Denied cough, shortness of breath, wheezing.  GI: Denied abdominal pain, constipation or diarrhea.  Endocrine: Denied temperature intolerance, increased frequency of urination, polyphagia or polydipsia.  Musculoskeletal: Denied back pain or joint pain.    All other systems reviewed and are negative     Objective:     Blood pressure 110/80, pulse 73,  "temperature 37.2 °C (99 °F), height 1.651 m (5' 5\"), weight 81.6 kg (180 lb), SpO2 96 %. Body mass index is 29.95 kg/m².  Physical Exam:    Constitutional: Well nourished and Well developed, Alert, no distress.  Skin: Warm, dry, good turgor, no rashes in visible areas.  Eye: Equal, round and reactive, conjunctiva clear, lids normal.  ENMT: Lips without lesions, good dentition, oropharynx clear.  Neck: Trachea midline, no masses, no thyromegaly. Right cervical lymph node noticed on exam. No supraclavicular lymphadenopathy.  Respiratory: Unlabored respiratory effort, lungs clear to auscultation, no wheezes, no ronchi.  Cardiovascular: Normal S1, S2, no murmur, no edema.   Abdomen: Soft, non distended, non-tender, no masses, no hepatosplenomegaly. Bowel sound normal.  Extremities: No edema, no clubbing, no cyanosis.  Psych: Alert and oriented x3, normal affect and mood.        Assessment and Plan:   The following treatment plan was discussed       1. Pure hypercholesterolemia  - Not well controlled with diet and exercise. Patient agreed to try simvastatin with low-dose. We reviewed the potential side effect of simvastatin in clinic. Patient is advised to start with low-dose simvastatin 3 days a week and slowly increased to 5 days a week and then 7 days a week if she tolerates.  - Advised to eat low fat, low carbohydrate and high fiber diet as well as do cardio physical exercise regularly.   - COMP METABOLIC PANEL; Future  - LIPID PROFILE; Future  - simvastatin (ZOCOR) 10 MG Tab; Take 1 Tab by mouth every evening.  Dispense: 90 Tab; Refill: 3    2. Essential hypertension  - Patient agreed to take losartan 25 mg daily and discontinue hydrochlorothiazide.   - Review potential side effects of losartan with patient.  - Recommend to monitor blood pressure and heart rate at home.  - losartan (COZAAR) 25 MG Tab; Take 1 Tab by mouth every day.  Dispense: 90 Tab; Refill: 3  - CBC WITH DIFFERENTIAL; Future  - COMP METABOLIC " PANEL; Future    3. Mild intermittent asthma without complication  - Stable. Patient would like to try Singulair for her allergies and asthma. Advised to try Singulair from after she completes clarithromycin for sinusitis and bronchitis treatment which is started 2 days ago  - albuterol (VENTOLIN HFA) 108 (90 Base) MCG/ACT Aero Soln inhalation aerosol; Inhale 2 Puffs by mouth every 6 hours as needed.  Dispense: 18 Inhaler; Refill: 3  - montelukast (SINGULAIR) 10 MG Tab; Take 1 Tab by mouth every day.  Dispense: 30 Tab; Refill: 6  - CBC WITH DIFFERENTIAL; Future  - COMP METABOLIC PANEL; Future    4. Obsessive-compulsive disorder, unspecified type  - Patient declined to take medication. Her symptoms is well controlled without medication. Continue to monitor.    5. Increased PTH level  - Patient follows with endocrinologist in [facility]. She will have blood tests and follow with endocrinologist in a few weeks.    6. Gastroesophageal reflux disease without esophagitis  - Her symptoms is well controlled with Zantac, 150 mg twice a day. She wants to keep Prilosec only as needed. She request to refill center.  - I reviewed the potential side effects of chronic use of PPI and acid suppressor with patient today. She understands the potential side effects and she will take vitamin D, calcium, magnesium, B12 supplements.  - omeprazole (PRILOSEC) 20 MG delayed-release capsule; TAKE ONE CAPSULE BY MOUTH TWICE A DAY FOR 30 MIN BEFORE BREAKFAST & 30MIN BEFORE DINNER; Refill: 5  - raNITidine (ZANTAC) 150 MG Tab; Take 1 Tab by mouth 2 times a day.  Dispense: 180 Tab; Refill: 3    7. Right cervical lymph node  - Right subclavian lymph node noticed on exam. Nontender on palpation. Patient has sinusitis and bronchitis recently and started clarithromycin treatments from [facility] clinic 2 days ago.  - It can be reactive lymphadenopathy. Patient agreed to continue to monitor.  - We will check CBC, CMP for follow-up. If her lymph node  persists a few weeks after antibiotic treatment, we will need to do further assessment. Patient understands and agrees with the plan.      Records requested.  Followup: Return in about 3 months (around 5/22/2018), or if symptoms worsen or fail to improve, for hypercholesterolemia, hypertension, asthma, hyperparathyroidism, lab review. sooner should new symptoms or problems arise.      Please note that this dictation was created using voice recognition software. I have made every reasonable attempt to correct obvious errors, but I expect that there may have unintended errors in text, spelling, punctuation, or grammar that I did not discover.

## 2018-02-27 NOTE — PROCEDURES
DIAGNOSTIC HOME SLEEP TEST (HST) REPORT       PATIENT ID:  NAME:  Martha Ordonez  MRN:               [id number]  YOB: 1945  DATE OF STUDY: 2/23/18      Impression:     This study shows evidence of:     1. Moderate  obstructive sleep apnea with  Respiratory Event Index (BEBETO) of 19.7 per hour and worse in supine sleep with BEBETO at 32/hr. These findings are based on the recording time (flow evaluation time). It is not possible with this device to determine a traditional apnea+hypopnea index (AHI) for total sleep time since EEG channels are not available.   O2 Sat. kwadwo was 80% and mean O2 sat was 91% and baseline O2 at 95 %. O2 sat was below 90% for 31% of the flow evaluation time. Oxygen Desaturation (>=4%) Index was elevated at 21.3/hr.       TECHNICAL DESCRIPTION:  ResMed Device used was a type-III home study device. Home sleep study recording included: Airflow recording by nasal pressure transducer; Respiratory Effort by abdominal Respiratory Bands; O2 by finger oximetry. A position sensor and a snore channel was also used.    Scoring Criteria: A modification of the the AASM Manual for the Scoring of Sleep and Associated Events, 2012, was used.   Obstructive apnea was scored by cessation of airflow for at least 10 seconds with continuing respiratory effort.  Central apnea was scored by cessation of airflow for at least 10 seconds with no effort.  Hypopnea was scored by a 30% or more reduction in airflow for at least 10 seconds accompanied by an arterial oxygen desaturation of 3% or more.  (For Medicare patients, hypopneas were scored by a 30% or more reduction in airflow for at least 10 seconds accompanied by an arterial oxygen saturation of 4% or more, as required by their insurance, CMS.          General sleep summary: . Total recording time is 10 hours and 58 minutes and total flow evaluation time is 10 hours and 43 minutes. The patient spent 1 hours and 8 minutes in the supine position  and 9 hours and 29 minutes in the nonsupine position.    Respiratory events:    Apneas: 85 (Obstructive apnea index 4.5/hr, Central apnea index 3.4 /hr, mixed 0 /hour)  Hypopneas: 126    Recommendations:    1. CPAP titration study.   2.   In general patients with sleep apnea are advised to avoid alcohol and sedatives and to not operate a motor vehicle while drowsy. In some cases alternative treatment options may prove effective in resolving sleep apnea in these options include upper airway surgery, the use of a dental orthotic or weight loss and positional therapy. Clinical correlation is required.         Please see the attached report for further details. Thank you for your referral.     Harshad Mason MD

## 2018-03-02 ENCOUNTER — PATIENT MESSAGE (OUTPATIENT)
Dept: OTHER | Facility: IMAGING CENTER | Age: 73
End: 2018-03-02

## 2018-03-02 NOTE — LETTER
March 5, 2018        Re: Martha Boateng Mery    To whom it may concern,    Patient is requesting to discontinue to use oxygen at night. She will use CPAP machine for sleep apnea. So she requested to discontinue using oxygen concentrator.       Regards,          Ruchi Plasencia

## 2018-03-05 NOTE — TELEPHONE ENCOUNTER
From: Martha Ordonez  To: Sabra Arguello M.D.  Sent: 3/2/2018 2:54 PM PST  Subject: Non-Urgent Medical Question    Dr. Arguello,  I have not used my oxygen for the last 3 months. The machine is too loud for me to sleep with. In the meantime, I've had my sleep study and will have to have a CPAP so oxygen will not be needed. I called [employer] to  the machine but they require a DC order faxed to them from the prescribing physician. Would you please send them a DC order so that they will come  this machine. Their FAX number is .  I have seen one of the doctors you recommended, Dr. Ruchi Plasencia, and think that we will get along just fine although I will always miss you.  I hope your new practice is going well.  Thank you.  Martha Ordonez

## 2018-03-05 NOTE — TELEPHONE ENCOUNTER
Patient is requesting to discontinue to use oxygen at night. She will use CPAP machine for sleep apnea. So she requested to discontinue using oxygen concentrator.     Please fax the letter to [phone].      Thanks!  Ruchi Plasencia M.D.

## 2018-03-08 ENCOUNTER — APPOINTMENT (RX ONLY)
Dept: URBAN - METROPOLITAN AREA CLINIC 31 | Facility: CLINIC | Age: 73
Setting detail: DERMATOLOGY
End: 2018-03-08

## 2018-03-08 DIAGNOSIS — L57.8 OTHER SKIN CHANGES DUE TO CHRONIC EXPOSURE TO NONIONIZING RADIATION: ICD-10-CM

## 2018-03-08 DIAGNOSIS — D18.0 HEMANGIOMA: ICD-10-CM

## 2018-03-08 DIAGNOSIS — D22 MELANOCYTIC NEVI: ICD-10-CM

## 2018-03-08 DIAGNOSIS — Z85.828 PERSONAL HISTORY OF OTHER MALIGNANT NEOPLASM OF SKIN: ICD-10-CM

## 2018-03-08 DIAGNOSIS — L57.0 ACTINIC KERATOSIS: ICD-10-CM

## 2018-03-08 DIAGNOSIS — L82.1 OTHER SEBORRHEIC KERATOSIS: ICD-10-CM

## 2018-03-08 DIAGNOSIS — Z80.8 FAMILY HISTORY OF MALIGNANT NEOPLASM OF OTHER ORGANS OR SYSTEMS: ICD-10-CM

## 2018-03-08 DIAGNOSIS — L81.4 OTHER MELANIN HYPERPIGMENTATION: ICD-10-CM

## 2018-03-08 PROBLEM — D18.01 HEMANGIOMA OF SKIN AND SUBCUTANEOUS TISSUE: Status: ACTIVE | Noted: 2018-03-08

## 2018-03-08 PROBLEM — D48.5 NEOPLASM OF UNCERTAIN BEHAVIOR OF SKIN: Status: ACTIVE | Noted: 2018-03-08

## 2018-03-08 PROBLEM — D22.5 MELANOCYTIC NEVI OF TRUNK: Status: ACTIVE | Noted: 2018-03-08

## 2018-03-08 PROCEDURE — 11100: CPT | Mod: 59

## 2018-03-08 PROCEDURE — 11101: CPT

## 2018-03-08 PROCEDURE — 99213 OFFICE O/P EST LOW 20 MIN: CPT | Mod: 25

## 2018-03-08 PROCEDURE — 17000 DESTRUCT PREMALG LESION: CPT

## 2018-03-08 PROCEDURE — ? LIQUID NITROGEN

## 2018-03-08 PROCEDURE — ? BIOPSY BY SHAVE METHOD

## 2018-03-08 PROCEDURE — ? COUNSELING

## 2018-03-08 ASSESSMENT — LOCATION DETAILED DESCRIPTION DERM
LOCATION DETAILED: LEFT DISTAL DORSAL FOREARM
LOCATION DETAILED: UPPER STERNUM
LOCATION DETAILED: INFERIOR THORACIC SPINE
LOCATION DETAILED: LEFT PROXIMAL DORSAL FOREARM
LOCATION DETAILED: STERNAL NOTCH
LOCATION DETAILED: RIGHT PROXIMAL DORSAL FOREARM
LOCATION DETAILED: RIGHT DISTAL DORSAL FOREARM
LOCATION DETAILED: RIGHT INFERIOR CENTRAL MALAR CHEEK
LOCATION DETAILED: RIGHT SUPERIOR MEDIAL MIDBACK
LOCATION DETAILED: LEFT INFERIOR CENTRAL MALAR CHEEK
LOCATION DETAILED: LEFT INFERIOR MEDIAL MIDBACK

## 2018-03-08 ASSESSMENT — LOCATION SIMPLE DESCRIPTION DERM
LOCATION SIMPLE: CHEST
LOCATION SIMPLE: RIGHT LOWER BACK
LOCATION SIMPLE: UPPER BACK
LOCATION SIMPLE: RIGHT CHEEK
LOCATION SIMPLE: RIGHT FOREARM
LOCATION SIMPLE: LEFT CHEEK
LOCATION SIMPLE: LEFT LOWER BACK
LOCATION SIMPLE: LEFT FOREARM

## 2018-03-08 ASSESSMENT — LOCATION ZONE DERM
LOCATION ZONE: TRUNK
LOCATION ZONE: FACE
LOCATION ZONE: ARM

## 2018-03-08 NOTE — PROCEDURE: BIOPSY BY SHAVE METHOD
Hemostasis: Aluminum Chloride and Electrocautery
Bill For Surgical Tray: no
Dressing: Band-Aid
Billing Type: Third-Party Bill
Lab Facility: 
Anesthesia Volume In Cc: 0
Size Of Lesion In Cm: 0.5
Silver Nitrate Text: The wound bed was treated with silver nitrate after the biopsy was performed.
Detail Level: Detailed
Type Of Destruction Used: Curettage
Biopsy Type: H and E
Anesthesia Type: 1% lidocaine with epinephrine
Consent: Written consent was obtained and risks were reviewed including but not limited to scarring, infection, bleeding, scabbing, incomplete removal, nerve damage and allergy to anesthesia.
Curettage Text: The wound bed was treated with curettage after the biopsy was performed.
Post-Care Instructions: I reviewed with the patient in detail post-care instructions. Patient is to keep the biopsy site dry overnight, and then apply bacitracin twice daily until healed. Patient may apply hydrogen peroxide soaks to remove any crusting.
Wound Care: Aquaphor
Notification Instructions: Patient will be notified of biopsy results. However, patient instructed to call the office if not contacted within 2 weeks.
Lab: [id number]
Cryotherapy Text: The wound bed was treated with cryotherapy after the biopsy was performed.
Biopsy Method: 15 blade
Electrodesiccation And Curettage Text: The wound bed was treated with electrodesiccation and curettage after the biopsy was performed.
Electrodesiccation Text: The wound bed was treated with electrodesiccation after the biopsy was performed.
Size Of Lesion In Cm: 0.6

## 2018-03-08 NOTE — PROCEDURE: LIQUID NITROGEN
Render Post-Care Instructions In Note?: no
Consent: The patient's consent was obtained including but not limited to risks of crusting, scabbing, blistering, scarring, darker or lighter pigmentary change, recurrence, incomplete removal and infection.
Post-Care Instructions: I reviewed with the patient in detail post-care instructions. Patient is to wear sunprotection, and avoid picking at any of the treated lesions. Pt may apply Vaseline  or Aquaphor to crusted or scabbing areas.
Duration Of Freeze Thaw-Cycle (Seconds): 0
Detail Level: Detailed

## 2018-03-13 DIAGNOSIS — J45.20 MILD INTERMITTENT ASTHMA WITHOUT COMPLICATION: ICD-10-CM

## 2018-03-13 RX ORDER — MONTELUKAST SODIUM 10 MG/1
10 TABLET ORAL DAILY
Qty: 90 TAB | Refills: 3 | Status: SHIPPED | OUTPATIENT
Start: 2018-03-13 | End: 2018-08-16

## 2018-03-23 ENCOUNTER — SLEEP CENTER VISIT (OUTPATIENT)
Dept: SLEEP MEDICINE | Facility: MEDICAL CENTER | Age: 73
End: 2018-03-23
Payer: MEDICARE

## 2018-03-23 VITALS
HEART RATE: 60 BPM | SYSTOLIC BLOOD PRESSURE: 124 MMHG | DIASTOLIC BLOOD PRESSURE: 78 MMHG | RESPIRATION RATE: 16 BRPM | TEMPERATURE: 97.4 F | WEIGHT: 181 LBS | OXYGEN SATURATION: 96 % | HEIGHT: 65 IN | BODY MASS INDEX: 30.16 KG/M2

## 2018-03-23 DIAGNOSIS — G47.33 OSA (OBSTRUCTIVE SLEEP APNEA): ICD-10-CM

## 2018-03-23 DIAGNOSIS — J45.20 MILD INTERMITTENT ASTHMA WITHOUT COMPLICATION: ICD-10-CM

## 2018-03-23 PROCEDURE — 99213 OFFICE O/P EST LOW 20 MIN: CPT | Performed by: FAMILY MEDICINE

## 2018-03-23 NOTE — PROGRESS NOTES
[facility] Follow Up Note     Date: 3/23/2018 / Time: 2:22 PM    Patient ID:   Name:             Martha Ordonez   YOB: 1945  Age:                 72 y.o.  female   MRN:               [id number]      Thank you for requesting a sleep medicine consultation on Martha Ordonez at the sleep center. She presents today with the chief complaints of VALERIE and HST follow up.     HISTORY OF PRESENT ILLNESS:       Pt is currently not on PAP therapy. She goes to sleep around 12 pm on weekdays and around on weekends. She gets out of bed at 9 am on weekdays and on weekends.  She  averages 7 hrs of sleep on a good night and 3-4 hrs on a bad nightThe symptoms of excessive daytime, snoring and gasping continues. Since last visit she had HST which showed   Moderate  obstructive sleep apnea with  Respiratory Event Index (BEBETO) of 19.7 per hour and worse in supine sleep with BEBETO at 32/hr. O2 Sat. kwadwo was 80% and mean O2 sat was 91% and baseline O2 at 95 %. O2 sat was below 90% for 31% of the flow evaluation time. Oxygen Desaturation (>=4%) Index was elevated at 21.3/hr.       SLEEP HISTORY   PSG showed mild obstructive and central respiratory events noted. The overall AHI was 14.7  events per hour comprised of 29% hypopneas, 38% obstructive apneas and 33% central apneas. The events were associated with desaturations to a kwadwo of 83%.The sleep efficiency was very poor 23% with only 85 minutes total sleep time obtained.        REVIEW OF SYSTEMS:       Constitutional: Denies fevers, Denies weight changes  Eyes: Denies changes in vision, no eye pain  Ears/Nose/Throat/Mouth: Denies nasal congestion or sore throat   Cardiovascular: Denies chest pain or palpitations   Respiratory: Denies shortness of breath , Denies cough  Gastrointestinal/Hepatic: Denies abdominal pain, nausea, vomiting, diarrhea, constipation or GI bleeding   Genitourinary: Denies bladder dysfunction, dysuria or  frequency  Musculoskeletal/Rheum: Denies  joint pain and swelling   Skin/Breast: Denies rash,   Neurological: Denies headache, confusion, memory loss or focal weakness/parasthesias  Psychiatric: denies mood disorder     Comprehensive review of systems form is reviewed with the patient and is attached in the EMR.     PMH:  has a past medical history of Abnormal thyroid function test (9/10/2014); Allergic rhinitis; Anxiety; Asthma; Back pain; Basal cell carcinoma; Bronchitis; Cardiac arrhythmia; Depression; Elevated liver enzymes (9/10/2014); Family history of melanoma; GERD (gastroesophageal reflux disease); Glaucoma suspect of both eyes; Hyperlipidemia; Hypertension; Nasal drainage; OCD (obsessive compulsive disorder); Pancreatitis; PCOS (polycystic ovarian syndrome); S/P ERCP; and Transient global amnesia.  MEDS:   Current Outpatient Prescriptions:   •  montelukast (SINGULAIR) 10 MG Tab, Take 1 Tab by mouth every day., Disp: 90 Tab, Rfl: 3  •  albuterol (VENTOLIN HFA) 108 (90 Base) MCG/ACT Aero Soln inhalation aerosol, Inhale 2 Puffs by mouth every 6 hours as needed., Disp: 18 Inhaler, Rfl: 3  •  losartan (COZAAR) 25 MG Tab, Take 1 Tab by mouth every day., Disp: 90 Tab, Rfl: 3  •  raNITidine (ZANTAC) 150 MG Tab, Take 1 Tab by mouth 2 times a day., Disp: 180 Tab, Rfl: 3  •  simvastatin (ZOCOR) 10 MG Tab, Take 1 Tab by mouth every evening., Disp: 90 Tab, Rfl: 3  •  aspirin EC (ECOTRIN) 81 MG Tablet Delayed Response, Take 81 mg by mouth., Disp: , Rfl:   •  fluticasone (FLONASE) 50 MCG/ACT nasal spray, Spray 1 Spray in nose as needed., Disp: , Rfl:   •  Cholecalciferol (VITAMIN D-3) 1000 UNITS CAPS, Take 1,000 Units by mouth 2 Times a Day., Disp: , Rfl:   •  cetirizine (ZYRTEC) 10 MG TABS, Take 10 mg by mouth every day., Disp: , Rfl:   •  clarithromycin (BIAXIN) 250 MG Tab, Take 250 mg by mouth 2 times a day., Disp: , Rfl:   •  omeprazole (PRILOSEC) 20 MG delayed-release capsule, TAKE ONE CAPSULE BY MOUTH TWICE A DAY  "FOR 30 MIN BEFORE BREAKFAST & 30MIN BEFORE DINNER, Disp: , Rfl: 5  •  ondansetron (ZOFRAN ODT) 4 MG TABLET DISPERSIBLE, Take 1 Tab by mouth every 8 hours as needed., Disp: 20 Tab, Rfl: 0  ALLERGIES:   Allergies   Allergen Reactions   • Ace Inhibitors      Cough   • Ceclor [Cefaclor] Hives   • Ceftin Hives   • Cephalosporins Rash   • Ciprofloxacin Rash   • Kenalog Hives   • Lamisil [Terbinafine Hcl]    • Latex Rash   • Merthiolate [Thimerosal] Hives   • Monistat [Tioconazole] Itching   • Pcn [Penicillins] Hives   • Benzalkonium Chloride Rash     SURGHX:   Past Surgical History:   Procedure Laterality Date   • ABDOMINAL HYSTERECTOMY TOTAL      ovaries remain   • ADENOIDECTOMY     • CARDIAC CATH, RIGHT HEART      normal, EKG was Abnormal    • CHOLECYSTECTOMY     • DILATION AND CURETTAGE     • EYE SURGERY      b/l iridotomy   • HYSTERECTOMY LAPAROSCOPY     • LUMPECTOMY      benign   • TONSILLECTOMY     • US-NEEDLE CORE BX-BREAST PANEL       SOCHX:  reports that she has never smoked. She has never used smokeless tobacco. She reports that she does not drink alcohol or use drugs..  FH:   Family History   Problem Relation Age of Onset   • Hypertension Mother    • Heart Disease Father      CHF   • Cancer Father      melanoma   • Hypertension Sister    • Stroke Sister    • Sleep Apnea Sister    • Cancer Paternal Grandfather      colon   • Heart Disease Paternal Grandfather    • Stroke Paternal Grandfather    • Diabetes Paternal Grandfather    • Other Maternal Grandmother      eplepsey   • Heart Attack Maternal Grandfather    • Stroke Maternal Grandfather    • Stroke Paternal Grandmother    • Sleep Apnea Sister          Physical Exam:  Vitals/ General Appearance:   Weight/BMI: Body mass index is 30.12 kg/m².  Blood pressure 124/78, pulse 60, temperature 36.3 °C (97.4 °F), resp. rate 16, height 1.651 m (5' 5\"), weight 82.1 kg (181 lb), SpO2 96 %.  Vitals:    03/23/18 1455   BP: 124/78   Pulse: 60   Resp: 16   Temp: 36.3 °C " "(97.4 °F)   SpO2: 96%   Weight: 82.1 kg (181 lb)   Height: 1.651 m (5' 5\")       Pt. is alert and oriented to time, place and person. Cooperative and in no apparent distress.          1. Head: Atraumatic, normocephalic.   2. Ears: Normal tympanic membrane and no discharge  3. Nose: No inferior turbinate hypertophy, no septal deviation, no polyp.   4. Throat: Oropharynx appears crowded in that the palate is overhanging  5. Neck: Supple. No thyromegaly  6. Chest: Trachea central, no spine deformity   7. Lungs auscultation: B/L good air entry, vesicular breath sounds, no adventitious sounds  8. Heart auscultation: 1st and 2nd heart sounds normal, regular rhythm. No appreciable murmur.  9. Abdomen: Soft, non tender, no organomegaly. Bowel sounds present  10. Extremities: No, no deformity, no clubbing, no pedal edema.  11. Skin: No rash  12. NEUROLOGICAL EXAMINATION: On neurological exam, the patient was alert and oriented x3. speech was clear and fluent without dysarthria.    INVESTIGATIONS:           ASSESSMENT AND PLAN     1.Obstructive Sleep Apnea (VALERIE).The symptoms of excessive daytime, snoring and gasping continues      The pathophysiology of VALERIE and the increased risk of cardiovascular morbidity from untreated VALERIE is discussed in detail with the patient. She  also has HTN and PVC  which can be worsened by her VALERIE.     She is urged to avoid supine sleep, weight gain and alcoholic beverages since all of these can worsen VALERIE. She is cautioned against drowsy driving. If She feels sleepy while driving, She must pull over for a break/nap, rather than persist on the road, in the interest of She own safety and that of others on the road.   Plan   - Auto CPAP vs overnight CPAP titration vs OAT. After informed discussion  She would like to try mendibular  advancement device.   - HST was reviewed and discussed with the pt   - f/u with HST once she has an oral appliance fully titrated     2. Regarding treatment of other past " medical problems and general health maintenance,  She is urged to follow up with PCP.

## 2018-05-31 ENCOUNTER — OFFICE VISIT (OUTPATIENT)
Dept: URGENT CARE | Facility: CLINIC | Age: 73
End: 2018-05-31
Payer: MEDICARE

## 2018-05-31 ENCOUNTER — HOSPITAL ENCOUNTER (OUTPATIENT)
Dept: RADIOLOGY | Facility: MEDICAL CENTER | Age: 73
End: 2018-05-31
Attending: NURSE PRACTITIONER
Payer: MEDICARE

## 2018-05-31 VITALS
DIASTOLIC BLOOD PRESSURE: 74 MMHG | HEART RATE: 60 BPM | OXYGEN SATURATION: 97 % | BODY MASS INDEX: 30.49 KG/M2 | RESPIRATION RATE: 18 BRPM | SYSTOLIC BLOOD PRESSURE: 116 MMHG | HEIGHT: 65 IN | TEMPERATURE: 98.2 F | WEIGHT: 183 LBS

## 2018-05-31 DIAGNOSIS — R22.1 NECK SWELLING: ICD-10-CM

## 2018-05-31 DIAGNOSIS — R22.0 LEFT FACIAL SWELLING: ICD-10-CM

## 2018-05-31 PROCEDURE — 76536 US EXAM OF HEAD AND NECK: CPT

## 2018-05-31 PROCEDURE — 99204 OFFICE O/P NEW MOD 45 MIN: CPT | Performed by: NURSE PRACTITIONER

## 2018-05-31 RX ORDER — CLINDAMYCIN HYDROCHLORIDE 300 MG/1
300 CAPSULE ORAL 3 TIMES DAILY
Qty: 30 CAP | Refills: 0 | Status: SHIPPED | OUTPATIENT
Start: 2018-05-31 | End: 2018-06-10

## 2018-05-31 RX ORDER — METHOCARBAMOL 500 MG/1
500 TABLET, FILM COATED ORAL
COMMUNITY
End: 2018-06-21

## 2018-05-31 ASSESSMENT — ENCOUNTER SYMPTOMS
SINUS PAIN: 0
DIAPHORESIS: 0
WEAKNESS: 0
CHILLS: 0
SORE THROAT: 0
CARDIOVASCULAR NEGATIVE: 1
NEUROLOGICAL NEGATIVE: 1
GASTROINTESTINAL NEGATIVE: 1
STRIDOR: 0
MUSCULOSKELETAL NEGATIVE: 1
RESPIRATORY NEGATIVE: 1
FEVER: 0
WEIGHT LOSS: 0

## 2018-05-31 NOTE — PROGRESS NOTES
Subjective:      Martha Ordonez is a 72 y.o. female who presents with Neck Problem (this morning swelling lymp, tender to the touch, hurts when biting down)        HPI    The patient presents to urgent care today with complaints of facial and neck swelling. States her symptoms started yesterday with fatigue. She awoke this morning with swelling to the left side of her face. The area is tender to palpation. She denies associated fever, chills, pain with mastication, sore throat, ear pain. She's not tried any medication for symptomatic relief. She denies any injury or trauma. She denies previous history of same. She is currently being seen in  by an endocrinologist for parathyroid disorder.    Past Medical History:   Diagnosis Date   • Abnormal thyroid function test 9/10/2014   • Allergic rhinitis    • Anxiety    • Asthma    • Back pain    • Basal cell carcinoma     squamos cell right ear   • Bronchitis    • Cardiac arrhythmia    • Depression    • Elevated liver enzymes 9/10/2014   • Family history of melanoma    • GERD (gastroesophageal reflux disease)    • Glaucoma suspect of both eyes    • Hyperlipidemia    • Hypertension    • Nasal drainage    • OCD (obsessive compulsive disorder)    • Pancreatitis    • PCOS (polycystic ovarian syndrome)    • S/P ERCP    • Transient global amnesia      Past Surgical History:   Procedure Laterality Date   • ABDOMINAL HYSTERECTOMY TOTAL      ovaries remain   • ADENOIDECTOMY     • CARDIAC CATH, RIGHT HEART      normal, EKG was Abnormal    • CHOLECYSTECTOMY     • DILATION AND CURETTAGE     • EYE SURGERY      b/l iridotomy   • HYSTERECTOMY LAPAROSCOPY     • LUMPECTOMY      benign   • TONSILLECTOMY     • US-NEEDLE CORE BX-BREAST PANEL       Current Outpatient Prescriptions on File Prior to Visit   Medication Sig Dispense Refill   • montelukast (SINGULAIR) 10 MG Tab Take 1 Tab by mouth every day. 90 Tab 3   • albuterol (VENTOLIN HFA) 108 (90 Base) MCG/ACT Aero Soln inhalation aerosol  "Inhale 2 Puffs by mouth every 6 hours as needed. 18 Inhaler 3   • losartan (COZAAR) 25 MG Tab Take 1 Tab by mouth every day. 90 Tab 3   • raNITidine (ZANTAC) 150 MG Tab Take 1 Tab by mouth 2 times a day. 180 Tab 3   • simvastatin (ZOCOR) 10 MG Tab Take 1 Tab by mouth every evening. 90 Tab 3   • aspirin EC (ECOTRIN) 81 MG Tablet Delayed Response Take 81 mg by mouth.     • fluticasone (FLONASE) 50 MCG/ACT nasal spray Spray 1 Spray in nose as needed.     • Cholecalciferol (VITAMIN D-3) 1000 UNITS CAPS Take 1,000 Units by mouth 2 Times a Day.     • cetirizine (ZYRTEC) 10 MG TABS Take 10 mg by mouth every day.       No current facility-administered medications on file prior to visit.      ALL: Ace inhibitors; Ceclor [cefaclor]; Ceftin; Cephalosporins; Ciprofloxacin; Kenalog; Lamisil [terbinafine hcl]; Latex; Merthiolate [thimerosal]; Monistat [tioconazole]; Pcn [penicillins]; and Benzalkonium chloride    Review of Systems   Constitutional: Positive for malaise/fatigue. Negative for chills, diaphoresis, fever and weight loss.   HENT: Negative for congestion, ear discharge, ear pain, hearing loss, nosebleeds, sinus pain, sore throat and tinnitus.         Left-sided facial, neck pain and swelling   Respiratory: Negative.  Negative for stridor.    Cardiovascular: Negative.    Gastrointestinal: Negative.    Musculoskeletal: Negative.    Skin: Negative.    Neurological: Negative.  Negative for weakness.          Objective:     /74   Pulse 60   Temp 36.8 °C (98.2 °F)   Resp 18   Ht 1.651 m (5' 5\")   Wt 83 kg (183 lb)   SpO2 97%   Breastfeeding? No   BMI 30.45 kg/m²      Physical Exam   Constitutional: She is oriented to person, place, and time. Vital signs are normal. She appears well-developed and well-nourished. She is active. She does not have a sickly appearance. She does not appear ill. No distress.   HENT:   Head: Normocephalic and atraumatic.       Right Ear: Hearing, tympanic membrane, external ear and ear " canal normal.   Left Ear: Hearing, tympanic membrane, external ear and ear canal normal.   Nose: Nose normal. Right sinus exhibits no maxillary sinus tenderness and no frontal sinus tenderness. Left sinus exhibits no maxillary sinus tenderness and no frontal sinus tenderness.   Mouth/Throat: Uvula is midline, oropharynx is clear and moist and mucous membranes are normal. No oral lesions. No trismus in the jaw. Normal dentition. No dental abscesses, uvula swelling, lacerations or dental caries. No oropharyngeal exudate, posterior oropharyngeal edema, posterior oropharyngeal erythema or tonsillar abscesses.   Eyes: Conjunctivae and EOM are normal. Pupils are equal, round, and reactive to light. Right eye exhibits no discharge. Left eye exhibits no discharge. No scleral icterus.   Neck: Normal range of motion. Neck supple. No JVD present. No tracheal deviation present. No thyromegaly present.   Cardiovascular: Normal rate, regular rhythm, normal heart sounds and intact distal pulses.    Pulmonary/Chest: Effort normal and breath sounds normal. No stridor. No respiratory distress. She has no wheezes.   Musculoskeletal: Normal range of motion. She exhibits no edema, tenderness or deformity.   Lymphadenopathy:     She has cervical adenopathy.   Neurological: She is alert and oriented to person, place, and time. She has normal strength. No cranial nerve deficit or sensory deficit. She exhibits normal muscle tone. Coordination and gait normal. GCS eye subscore is 4. GCS verbal subscore is 5. GCS motor subscore is 6.   Skin: Skin is warm, dry and intact. Capillary refill takes less than 2 seconds. No abrasion, no bruising, no ecchymosis, no laceration and no rash noted. She is not diaphoretic. No erythema. No pallor.   Psychiatric: She has a normal mood and affect. Her behavior is normal. Judgment and thought content normal.   Vitals reviewed.              Assessment/Plan:     1. Left facial swelling  US-SOFT TISSUES OF HEAD  "- NECK    clindamycin (CLEOCIN) 300 MG Cap           US radiology reading \"1.  Unremarkable thyroid ultrasound. 2.  There is no sonographic abnormality in the left upper neck in the area of interest.\"        Ultrasound is negative today. RICE. Continue NSAIDs. Follow up with her dentist tomorrow. The patient is given a contingent antibiotic prescription given to patient to fill upon meeting criteria of guidelines discussed. Probiotic use strongly encouraged. Supportive care, differential diagnoses, and indications for immediate follow-up discussed with patient.   Pathogenesis of diagnosis discussed including typical length and natural progression.   Instructed to return to clinic or nearest emergency department for any change in condition, further concerns, or worsening of symptoms.  Patient states understanding of the plan of care and discharge instructions.  Instructed to make an appointment, for follow up, with her primary care provider.        RAMSEY García.      "

## 2018-06-07 ENCOUNTER — HOSPITAL ENCOUNTER (OUTPATIENT)
Dept: LAB | Facility: MEDICAL CENTER | Age: 73
End: 2018-06-07
Attending: INTERNAL MEDICINE
Payer: MEDICARE

## 2018-06-07 DIAGNOSIS — J45.20 MILD INTERMITTENT ASTHMA WITHOUT COMPLICATION: ICD-10-CM

## 2018-06-07 DIAGNOSIS — I10 ESSENTIAL HYPERTENSION: ICD-10-CM

## 2018-06-07 DIAGNOSIS — E78.00 PURE HYPERCHOLESTEROLEMIA: ICD-10-CM

## 2018-06-07 LAB
ALBUMIN SERPL BCP-MCNC: 4.2 G/DL (ref 3.2–4.9)
ALBUMIN/GLOB SERPL: 1.4 G/DL
ALP SERPL-CCNC: 123 U/L (ref 30–99)
ALT SERPL-CCNC: 23 U/L (ref 2–50)
ANION GAP SERPL CALC-SCNC: 6 MMOL/L (ref 0–11.9)
AST SERPL-CCNC: 25 U/L (ref 12–45)
BASOPHILS # BLD AUTO: 0.7 % (ref 0–1.8)
BASOPHILS # BLD: 0.04 K/UL (ref 0–0.12)
BILIRUB SERPL-MCNC: 1 MG/DL (ref 0.1–1.5)
BUN SERPL-MCNC: 17 MG/DL (ref 8–22)
CALCIUM SERPL-MCNC: 9.5 MG/DL (ref 8.5–10.5)
CHLORIDE SERPL-SCNC: 104 MMOL/L (ref 96–112)
CHOLEST SERPL-MCNC: 181 MG/DL (ref 100–199)
CO2 SERPL-SCNC: 29 MMOL/L (ref 20–33)
CREAT SERPL-MCNC: 0.81 MG/DL (ref 0.5–1.4)
EOSINOPHIL # BLD AUTO: 0.16 K/UL (ref 0–0.51)
EOSINOPHIL NFR BLD: 2.9 % (ref 0–6.9)
ERYTHROCYTE [DISTWIDTH] IN BLOOD BY AUTOMATED COUNT: 41.5 FL (ref 35.9–50)
GLOBULIN SER CALC-MCNC: 3 G/DL (ref 1.9–3.5)
GLUCOSE SERPL-MCNC: 94 MG/DL (ref 65–99)
HCT VFR BLD AUTO: 41.7 % (ref 37–47)
HDLC SERPL-MCNC: 64 MG/DL
HGB BLD-MCNC: 14 G/DL (ref 12–16)
IMM GRANULOCYTES # BLD AUTO: 0.05 K/UL (ref 0–0.11)
IMM GRANULOCYTES NFR BLD AUTO: 0.9 % (ref 0–0.9)
LDLC SERPL CALC-MCNC: 88 MG/DL
LYMPHOCYTES # BLD AUTO: 2.07 K/UL (ref 1–4.8)
LYMPHOCYTES NFR BLD: 38.1 % (ref 22–41)
MCH RBC QN AUTO: 31.6 PG (ref 27–33)
MCHC RBC AUTO-ENTMCNC: 33.6 G/DL (ref 33.6–35)
MCV RBC AUTO: 94.1 FL (ref 81.4–97.8)
MONOCYTES # BLD AUTO: 0.39 K/UL (ref 0–0.85)
MONOCYTES NFR BLD AUTO: 7.2 % (ref 0–13.4)
NEUTROPHILS # BLD AUTO: 2.73 K/UL (ref 2–7.15)
NEUTROPHILS NFR BLD: 50.2 % (ref 44–72)
NRBC # BLD AUTO: 0 K/UL
NRBC BLD-RTO: 0 /100 WBC
PLATELET # BLD AUTO: 221 K/UL (ref 164–446)
PMV BLD AUTO: 10.2 FL (ref 9–12.9)
POTASSIUM SERPL-SCNC: 4.1 MMOL/L (ref 3.6–5.5)
PROT SERPL-MCNC: 7.2 G/DL (ref 6–8.2)
RBC # BLD AUTO: 4.43 M/UL (ref 4.2–5.4)
SODIUM SERPL-SCNC: 139 MMOL/L (ref 135–145)
TRIGL SERPL-MCNC: 145 MG/DL (ref 0–149)
WBC # BLD AUTO: 5.4 K/UL (ref 4.8–10.8)

## 2018-06-07 PROCEDURE — 80061 LIPID PANEL: CPT

## 2018-06-07 PROCEDURE — 80053 COMPREHEN METABOLIC PANEL: CPT

## 2018-06-07 PROCEDURE — 36415 COLL VENOUS BLD VENIPUNCTURE: CPT

## 2018-06-07 PROCEDURE — 85025 COMPLETE CBC W/AUTO DIFF WBC: CPT

## 2018-06-21 ENCOUNTER — OFFICE VISIT (OUTPATIENT)
Dept: MEDICAL GROUP | Age: 73
End: 2018-06-21
Payer: MEDICARE

## 2018-06-21 ENCOUNTER — TELEPHONE (OUTPATIENT)
Dept: MEDICAL GROUP | Age: 73
End: 2018-06-21

## 2018-06-21 VITALS
TEMPERATURE: 99.2 F | DIASTOLIC BLOOD PRESSURE: 68 MMHG | SYSTOLIC BLOOD PRESSURE: 128 MMHG | BODY MASS INDEX: 30.06 KG/M2 | HEIGHT: 65 IN | OXYGEN SATURATION: 93 % | HEART RATE: 66 BPM | WEIGHT: 180.4 LBS

## 2018-06-21 DIAGNOSIS — N64.4 BREAST PAIN IN FEMALE: ICD-10-CM

## 2018-06-21 DIAGNOSIS — I10 ESSENTIAL HYPERTENSION: ICD-10-CM

## 2018-06-21 DIAGNOSIS — E78.00 PURE HYPERCHOLESTEROLEMIA: ICD-10-CM

## 2018-06-21 DIAGNOSIS — J45.20 MILD INTERMITTENT ASTHMA WITHOUT COMPLICATION: ICD-10-CM

## 2018-06-21 DIAGNOSIS — G47.34 NOCTURNAL OXYGEN DESATURATION: ICD-10-CM

## 2018-06-21 DIAGNOSIS — E55.9 VITAMIN D INSUFFICIENCY: ICD-10-CM

## 2018-06-21 PROCEDURE — 99215 OFFICE O/P EST HI 40 MIN: CPT | Performed by: INTERNAL MEDICINE

## 2018-06-21 RX ORDER — UBIDECARENONE 100 MG
300 CAPSULE ORAL
COMMUNITY
End: 2019-01-16

## 2018-06-21 RX ORDER — CHOLECALCIFEROL (VITAMIN D3) 50 MCG
2000 TABLET ORAL DAILY
COMMUNITY

## 2018-06-21 NOTE — ASSESSMENT & PLAN NOTE
Patient reported that she has intermittent bilateral breast pain for 3 months.  She states that she tried to with supporting sport bra without wire to decrease the pain.  She denies history of breast cancer.  She has history of breast lump and has 3 palsy in the past.  She states that all her biopsy was benign.  Patient is a non-smoker.  She is not using any hormone supplement currently.  Patient denies family history of breast cancer.  Last mammogram on 10/7/17 showed fibroglandular dense tissue on both breasts.

## 2018-06-21 NOTE — ASSESSMENT & PLAN NOTE
Patient is taking vitamin D 2000 units daily.  Her vitamin D level is stable and well controlled with current regimens.

## 2018-06-21 NOTE — TELEPHONE ENCOUNTER
Phone Number Called: [phone] (home)       Message: Informed patient of faxed over Rx for O2 to [facility]. Patient explains she thought O2 wasn't needed anymore. Will talk to you at appointment scheduled for today.     Left Message for patient to call back: no

## 2018-06-21 NOTE — ASSESSMENT & PLAN NOTE
Patient reported that she stopped using oxygen for more than 6 months.  She returned oxygen machine to [facility] already.  She did not want to continue using oxygen.  Patient states that she has mild sleep apnea which she is planning to work with her dentist.  She is going to use mouthpiece first.  If she does not improve with mouthpiece, she will reuse oxygen.  So patient does not require to continue oxygen supplements currently.  I will discontinue oxygen refill today after discussion with patient.  We called to inform [facility] to cancel oxygen supply.  I will also fax the letter to [employer] [facility] [employer] [facility] to discontinue oxygen supply.

## 2018-06-21 NOTE — ASSESSMENT & PLAN NOTE
Patient states that she is taking singulair 10 mg daily and albuterol inhaler as needed.  She is doing well with current regimens.  She denies side effects from using Singulair or albuterol inhaler.  She denied shortness of breath or wheezing.

## 2018-06-21 NOTE — LETTER
June 21, 2018      To whom it may concern,    Please discontinue oxygen supply for . Martha Gregoryipes.        Regards,        Ruchi Plasencia

## 2018-06-21 NOTE — PROGRESS NOTES
Subjective:   Deb Bangura is a 73 y.o. female here today for evaluation and management of:      Vitamin D insufficiency  Patient is taking vitamin D 2000 units daily.  Her vitamin D level is stable and well controlled with current regimens.    Pure hypercholesterolemia  Patient states that she tolerates simvastatin without side effects she is currently taking simvastatin 10 mg 1 tablet every evening.  Her cholesterol level improved.  She still has chronically elevated alkaline phosphatase which slightly increased from previous blood test.  However patient denies abdominal pain or jaundice.    Results for DEB BANGURA (MRN [id number]) as of 6/21/2018 15:39   Ref. Range 6/7/2018 10:22   Cholesterol,Tot Latest Ref Range: 100 - 199 mg/dL 181   Triglycerides Latest Ref Range: 0 - 149 mg/dL 145   HDL Latest Ref Range: >=40 mg/dL 64   LDL Latest Ref Range: <100 mg/dL 88       Nocturnal oxygen desaturation  Patient reported that she stopped using oxygen for more than 6 months.  She returned oxygen machine to [employer] company already.  She did not want to continue using oxygen.  Patient states that she has mild sleep apnea which she is planning to work with her dentist.  She is going to use mouthpiece first.  If she does not improve with mouthpiece, she will reuse oxygen.  So patient does not require to continue oxygen supplements currently.  I will discontinue oxygen refill today after discussion with patient.  We called to inform [employer] company to cancel oxygen supply.  I will also fax the letter to [employer] company to discontinue oxygen supply.    Mild asthma  Patient states that she is taking singulair 10 mg daily and albuterol inhaler as needed.  She is doing well with current regimens.  She denies side effects from using Singulair or albuterol inhaler.  She denied shortness of breath or wheezing.    Essential hypertension  Her blood pressure is stable with current regimens.  Patient  is taking losartan 25 mg daily and amlodipine she denies side effects from taking losartan.  Her kidney function and electrolytes are within normal on 6/7/18.    Breast pain in female, bilateral  Patient reported that she has intermittent bilateral breast pain for 3 months.  She states that she tried to with supporting sport bra without wire to decrease the pain.  She denies history of breast cancer.  She has history of breast lump and has 3 palsy in the past.  She states that all her biopsy was benign.  Patient is a non-smoker.  She is not using any hormone supplement currently.  Patient denies family history of breast cancer.  Last mammogram on 10/7/17 showed fibroglandular dense tissue on both breasts.         Current medicines (including changes today)  Current Outpatient Prescriptions   Medication Sig Dispense Refill   • montelukast (SINGULAIR) 10 MG Tab Take 1 Tab by mouth every day. 90 Tab 3   • albuterol (VENTOLIN HFA) 108 (90 Base) MCG/ACT Aero Soln inhalation aerosol Inhale 2 Puffs by mouth every 6 hours as needed. 18 Inhaler 3   • losartan (COZAAR) 25 MG Tab Take 1 Tab by mouth every day. 90 Tab 3   • raNITidine (ZANTAC) 150 MG Tab Take 1 Tab by mouth 2 times a day. 180 Tab 3   • simvastatin (ZOCOR) 10 MG Tab Take 1 Tab by mouth every evening. 90 Tab 3   • aspirin EC (ECOTRIN) 81 MG Tablet Delayed Response Take 81 mg by mouth.     • fluticasone (FLONASE) 50 MCG/ACT nasal spray Spray 1 Spray in nose as needed.     • cetirizine (ZYRTEC) 10 MG TABS Take 10 mg by mouth every day.     • Coenzyme Q10 100 MG Cap Take 300 mg by mouth.     • Cholecalciferol (VITAMIN D) 2000 UNIT Tab Take 2,000 Units by mouth.       No current facility-administered medications for this visit.      She  has a past medical history of Abnormal thyroid function test (9/10/2014); Allergic rhinitis; Anxiety; Asthma; Back pain; Basal cell carcinoma; Bronchitis; Cardiac arrhythmia; Depression; Elevated liver enzymes (9/10/2014); Family  "history of melanoma; GERD (gastroesophageal reflux disease); Glaucoma suspect of both eyes; Hyperlipidemia; Hypertension; Nasal drainage; OCD (obsessive compulsive disorder); Pancreatitis; PCOS (polycystic ovarian syndrome); S/P ERCP; and Transient global amnesia.    ROS   No chest pain, no shortness of breath, no abdominal pain       Objective:     Blood pressure 128/68, pulse 66, temperature 37.3 °C (99.2 °F), height 1.659 m (5' 5.3\"), weight 81.8 kg (180 lb 6.4 oz), SpO2 93 %. Body mass index is 29.74 kg/m².   Physical Exam:  General: Alert, oriented and no acute distress.  Eye contact is good, speech goal directed, affect calm  HEENT: conjunctiva non-injected, sclera non-icteric.  Oral mucous membranes pink and moist with no lesions.  Pinna normal.   Lungs: Normal respiratory effort, clear to auscultation bilaterally with good excursion.  CV: regular rate and rhythm. No murmurs.   Abdomen: soft, non distended, nontender, Bowel sound normal.  Ext: no edema, color normal, vascularity normal, temperature normal    Breast exam: No palpable lump on both breasts, no nipple retraction or no nipple discharge.  Tenderness on palpation of the lower outer quadrant of the of both breasts.      Assessment and Plan:   The following treatment plan was discussed     1. Essential hypertension  - Well-controlled. Continue current regimens. Recheck lab 1-2 weeks before next follow up visit.  - Reviewed the risks and benefits as well as potential side effects of medications with patient.  - Discussed to eat low-sodium diet and encouraged to do regular physical exercise.  - Recommend to monitor blood pressure and heart rate at home.  - COMP METABOLIC PANEL; Future    2. Mild intermittent asthma without complication  - Well-controlled. Continue current regimens, singulair 10 mg daily and albuterol inhaler 2 puffs every 6 hours as needed.. Recheck lab 1-2 weeks before next follow up visit.  - COMP METABOLIC PANEL; Future    3. Pure " hypercholesterolemia  - Well-controlled. Continue current regimens. Recheck lab 1-2 weeks before next follow up visit.  - Reviewed the risks and benefits of treatment and potential side effects of medication.  - Advised to eat low fat, low carbohydrate and high fiber diet as well as do cardio physical exercise regularly.  - COMP METABOLIC PANEL; Future  - LIPID PROFILE; Future    4. Vitamin D insufficiency  - Well-controlled. Continue current regimens. Recheck lab 1-2 weeks before next follow up visit.  - VITAMIN D,25 HYDROXY; Future    5. Breast pain in female, bilateral  - No palpable breast lump on exam.  The patient has pain on both sides of the breast.  Advised patient to wear supporting bra with a wire.  Patient can try low-dose Tylenol or ibuprofen as needed.  - Ordered diagnostic mammogram for further evaluation.  - MA-DIAGNOSTIC MAMMO W/CAD-BILAT; Future    6. Nocturnal oxygen desaturation  - Patient will follow up with dentist to try mouthpiece to solve mild obstructive sleep apnea.  - She stopped using oxygen for more than 6 months.  Patient requested to discontinue oxygen supply.  - We called a [employer] oxygen DME company to discontinue oxygen.  We will also fax the letter to company to discontinue oxygen.    7. Health Maintenance   - Patient states that she will call to schedule with Dr. Rickie Plasencia, GI consultant to do surveillance colonoscopy.    Face-to-face time spent 40 minutes with patient and more than half of that time spent for counseling and cooperating of care for medical problems listed above.         Followup: Return in about 6 months (around 12/21/2018), or if symptoms worsen or fail to improve, for Hyperlipidemia, Hypertension, Asthma, Vitamin D insufficiency, Lab review.      Please note that this dictation was created using voice recognition software. I have made every reasonable attempt to correct obvious errors, but I expect that there may have unintended errors in text, spelling,  punctuation, or grammar that I did not discover.

## 2018-06-21 NOTE — ASSESSMENT & PLAN NOTE
Her blood pressure is stable with current regimens.  Patient is taking losartan 25 mg daily and amlodipine she denies side effects from taking losartan.  Her kidney function and electrolytes are within normal on 6/7/18.

## 2018-06-21 NOTE — ASSESSMENT & PLAN NOTE
Patient states that she tolerates simvastatin without side effects she is currently taking simvastatin 10 mg 1 tablet every evening.  Her cholesterol level improved.  She still has chronically elevated alkaline phosphatase which slightly increased from previous blood test.  However patient denies abdominal pain or jaundice.    Results for SVETA DEB PETERSEN (MRN [id number]) as of 6/21/2018 15:39   Ref. Range 6/7/2018 10:22   Cholesterol,Tot Latest Ref Range: 100 - 199 mg/dL 181   Triglycerides Latest Ref Range: 0 - 149 mg/dL 145   HDL Latest Ref Range: >=40 mg/dL 64   LDL Latest Ref Range: <100 mg/dL 88

## 2018-06-22 ENCOUNTER — TELEPHONE (OUTPATIENT)
Dept: RADIOLOGY | Facility: MEDICAL CENTER | Age: 73
End: 2018-06-22

## 2018-06-22 NOTE — TELEPHONE ENCOUNTER
pt aware we are wating for dr to sign epic order; pt states she has copy of original order & will bring in/tml

## 2018-06-25 ENCOUNTER — HOSPITAL ENCOUNTER (OUTPATIENT)
Dept: RADIOLOGY | Facility: MEDICAL CENTER | Age: 73
End: 2018-06-25
Attending: INTERNAL MEDICINE
Payer: MEDICARE

## 2018-06-25 DIAGNOSIS — N64.4 BREAST PAIN IN FEMALE: ICD-10-CM

## 2018-06-25 PROCEDURE — G0279 TOMOSYNTHESIS, MAMMO: HCPCS

## 2018-06-25 PROCEDURE — 76642 ULTRASOUND BREAST LIMITED: CPT | Mod: RT

## 2018-06-27 ENCOUNTER — TELEPHONE (OUTPATIENT)
Dept: MEDICAL GROUP | Age: 73
End: 2018-06-27

## 2018-06-28 NOTE — TELEPHONE ENCOUNTER
Please let patient know that I do not know how to let the other radiologist read her mammogram.  She can request her CD copy of mammogram from medical record department and she can request any radiologist to read that for second opinion.    Please also try to call radiologist department to see any available radiologist can take a look on her recent diagnostic mammogram and ultrasound and provide a second opinion.    Thanks!    Ruchi Plasencia M.D.

## 2018-06-28 NOTE — TELEPHONE ENCOUNTER
----- Message from [employer] sent at 6/27/2018  3:00 PM PDT -----  Patient informed of providers result note.    Patient stated that she would like a second opinion on her mammogram, patient said she was not happy with the radiologist that read her results and performed the test on her.  She went to the [facility] breast [place] on [address] [place] [address]. She does not want to perform the exam again just wants someone to re-read the results for her.    Thank you.

## 2018-06-29 NOTE — TELEPHONE ENCOUNTER
Phone Number Called: [phone] (home)       Message: Called patient and let her know she can request her results on a CD and request another radiologist for another opinion. Pt stated that she will call the center and see who else is available.    Left Message for patient to call back: yes

## 2018-08-13 ENCOUNTER — TELEPHONE (OUTPATIENT)
Dept: MEDICAL GROUP | Age: 73
End: 2018-08-13

## 2018-08-13 NOTE — TELEPHONE ENCOUNTER
1. Caller Name: Martha Ordonez                                           Call Back Number: [phone] (home)         Patient approves a detailed voicemail message: N\A    Patient called claiming that since she started losartan 25MG her BP has been high and low, has also had headaches and lightheadedness. She wants to come in on Thursday, August 16th at 10:40 to discuss these issues with you. However, she would like some advice on what to do until then.     Please advise.

## 2018-08-13 NOTE — TELEPHONE ENCOUNTER
Phone Number Called: [phone] (home)       Message: patient informed. Still wants to keep appointment    Left Message for patient to call back: no

## 2018-08-13 NOTE — TELEPHONE ENCOUNTER
If her blood pressure is less than 150/90, she does not require to increase the dose of losartan and just take losartan 25 mg once a day.  If her blood pressure is higher than 150/90, she can increase the dose of losartan to 50 mg once a day.  It is advised to increase water intake to 60 ounces of water daily.      Ruchi Plasencia M.D.

## 2018-08-16 ENCOUNTER — OFFICE VISIT (OUTPATIENT)
Dept: MEDICAL GROUP | Age: 73
End: 2018-08-16
Payer: MEDICARE

## 2018-08-16 VITALS
DIASTOLIC BLOOD PRESSURE: 70 MMHG | BODY MASS INDEX: 29.96 KG/M2 | HEIGHT: 65 IN | OXYGEN SATURATION: 92 % | HEART RATE: 68 BPM | TEMPERATURE: 99.8 F | SYSTOLIC BLOOD PRESSURE: 110 MMHG | WEIGHT: 179.8 LBS

## 2018-08-16 DIAGNOSIS — E78.00 PURE HYPERCHOLESTEROLEMIA: ICD-10-CM

## 2018-08-16 DIAGNOSIS — I10 ESSENTIAL HYPERTENSION: ICD-10-CM

## 2018-08-16 DIAGNOSIS — G43.009 MIGRAINE WITHOUT AURA AND WITHOUT STATUS MIGRAINOSUS, NOT INTRACTABLE: ICD-10-CM

## 2018-08-16 PROCEDURE — 99214 OFFICE O/P EST MOD 30 MIN: CPT | Performed by: INTERNAL MEDICINE

## 2018-08-16 RX ORDER — AMLODIPINE BESYLATE 2.5 MG/1
2.5 TABLET ORAL
Qty: 90 TAB | Refills: 3 | Status: SHIPPED | OUTPATIENT
Start: 2018-08-16 | End: 2019-05-22

## 2018-08-16 NOTE — PROGRESS NOTES
Subjective:   Deb Bangura is a 73 y.o. female here today for evaluation and management of:      Essential hypertension  Patient stated that she noticed her blood pressure has been high frequently in dental office and at home.  She has couple episodes of blood pressure reading above 150/90.  She did not feel any symptoms when she has high blood pressure.  She tried to take additional 1 dose of losartan 25 mg daily couple days ago because of high blood pressure.  Then she noticed dizziness and nausea.  She also reported that she she has history of migraine when she was young.  She started have recurrent migraine in April 2018.  She worried migraine is triggered by losartan.    Patient could not take beta-blocker as she had slow heartbeat by taking beta-blocker in the past.  She could not take hydrochlorothiazide due to increased parathyroid hormone and concerning of calcium abnormal.  She could not tolerate lisinopril due to dry cough.  After long discussion, patient would like to keep losartan 25 mg daily and will take amlodipine 2.5 mg daily as needed to twice daily as needed if her blood pressure is above 150/90.    Pure hypercholesterolemia  Patient is taking simvastatin 10 mg every evening.  She started taking simvastatin in February 2018.  Patient stated that she concerned of her headache is triggered by simvastatin as well.  She states that she will try to continue simvastatin and continue monitor her symptoms.  She denied other side effects from taking simvastatin.  Her cholesterol level improved after starting simvastatin.    Results for DEB BANGURA (MRN [id number]) as of 8/16/2018 14:49   Ref. Range 1/4/2016 11:05 5/29/2017 10:57 6/7/2018 10:22   Cholesterol,Tot Latest Ref Range: 100 - 199 mg/dL 234 (H) 221 (H) 181   Triglycerides Latest Ref Range: 0 - 149 mg/dL 78 124 145   HDL Latest Ref Range: >=40 mg/dL 69 67 64   LDL Latest Ref Range: <100 mg/dL 149 (H) 129 (H) 88       Migraine without aura and  without status migrainosus, not intractable  Patient reported that she has history of migraine when she was young and migraine resolved after menopause.  She has recurrent migraine in April 2018.  She stated that migraine resolved from taking Excedrin Migraine.  She reported that she will have migraine about once a week.  She want to monitor her symptom and continue Excedrin only.  She denied other neurological symptoms such as dizziness, weakness, sensation changes, vision changes from migraine.         Current medicines (including changes today)  Current Outpatient Prescriptions   Medication Sig Dispense Refill   • amLODIPine (NORVASC) 2.5 MG Tab Take 1 Tab by mouth 1 time daily as needed. 90 Tab 3   • Coenzyme Q10 100 MG Cap Take 300 mg by mouth.     • Cholecalciferol (VITAMIN D) 2000 UNIT Tab Take 2,000 Units by mouth.     • albuterol (VENTOLIN HFA) 108 (90 Base) MCG/ACT Aero Soln inhalation aerosol Inhale 2 Puffs by mouth every 6 hours as needed. 18 Inhaler 3   • losartan (COZAAR) 25 MG Tab Take 1 Tab by mouth every day. 90 Tab 3   • raNITidine (ZANTAC) 150 MG Tab Take 1 Tab by mouth 2 times a day. 180 Tab 3   • simvastatin (ZOCOR) 10 MG Tab Take 1 Tab by mouth every evening. 90 Tab 3   • aspirin EC (ECOTRIN) 81 MG Tablet Delayed Response Take 81 mg by mouth.     • fluticasone (FLONASE) 50 MCG/ACT nasal spray Spray 1 Spray in nose as needed.     • cetirizine (ZYRTEC) 10 MG TABS Take 10 mg by mouth every day.       No current facility-administered medications for this visit.      She  has a past medical history of Abnormal thyroid function test (9/10/2014); Allergic rhinitis; Anxiety; Asthma; Back pain; Basal cell carcinoma; Bronchitis; Cardiac arrhythmia; Depression; Elevated liver enzymes (9/10/2014); Family history of melanoma; GERD (gastroesophageal reflux disease); Glaucoma suspect of both eyes; Hyperlipidemia; Hypertension; Nasal drainage; OCD (obsessive compulsive disorder); Pancreatitis; PCOS (polycystic  "ovarian syndrome); S/P ERCP; and Transient global amnesia.    ROS   No chest pain, no shortness of breath, no abdominal pain       Objective:     Blood pressure 110/70, pulse 68, temperature 37.7 °C (99.8 °F), height 1.651 m (5' 5\"), weight 81.6 kg (179 lb 12.8 oz), SpO2 92 %, not currently breastfeeding. Body mass index is 29.92 kg/m².   Physical Exam:  General: Alert, oriented and no acute distress.  Eye contact is good, speech goal directed, affect calm  HEENT: conjunctiva non-injected, sclera non-icteric.  Oral mucous membranes pink and moist with no lesions.  Pinna normal.   Lungs: Normal respiratory effort, clear to auscultation bilaterally with good excursion.  CV: regular rate and rhythm. No murmurs.   Abdomen: soft, non distended, nontender, Bowel sound normal.  Ext: no edema, color normal, vascularity normal, temperature normal        Assessment and Plan:   The following treatment plan was discussed     1. Essential hypertension  - Patient will continue losartan 25 mg daily.  She will monitor her blood pressure and pulse at home.  If her blood pressure is more than 150/90, she will take amlodipine 2.5 mg daily as needed.  - Recommend to monitor blood pressure and heart rate at home.  - Advised to eat low-sodium diet.  Encouraged to do regular physical exercise.  - Patient is advised to return to clinic sooner if her blood pressure is not well controlled or she has side effects from taking her medication.  Patient is advised to update her health condition through my chart as well.  - amLODIPine (NORVASC) 2.5 MG Tab; Take 1 Tab by mouth 1 time daily as needed.  Dispense: 90 Tab; Refill: 3    2. Pure hypercholesterolemia  - Well-controlled. Continue current regimens, simvastatin 10 mg every evening.  Patient agreed to continue simvastatin currently.  Review potential side effects of simvastatin with patient.  Recheck lab 1-2 weeks before next follow up visit.  - Reviewed the risks and benefits of treatment " and potential side effects of medication.  - Advised to eat low fat, low carbohydrate and high fiber diet as well as do cardio physical exercise regularly.    3. Migraine without aura and without status migrainosus, not intractable  - Well-controlled with Excedrin Migraine from over-the-counter.  Patient is advised to keep well-hydrated.  Patient is advised to monitor blood pressure if she has headache.  - Patient is advised to return to clinic sooner if her migraine is not well controlled.      Followup: Return in about 5 months (around 1/16/2019), or if symptoms worsen or fail to improve, for Hypertension, Hyperlipidemia, Asthma, migraine, vitamin D insufficiency, Lab review.      Please note that this dictation was created using voice recognition software. I have made every reasonable attempt to correct obvious errors, but I expect that there may have unintended errors in text, spelling, punctuation, or grammar that I did not discover.

## 2018-08-18 ENCOUNTER — PATIENT MESSAGE (OUTPATIENT)
Dept: MEDICAL GROUP | Age: 73
End: 2018-08-18

## 2018-08-18 DIAGNOSIS — I10 ESSENTIAL HYPERTENSION: ICD-10-CM

## 2018-08-20 RX ORDER — HYDROCHLOROTHIAZIDE 25 MG/1
25 TABLET ORAL DAILY
Qty: 90 TAB | Refills: 3 | Status: SHIPPED | OUTPATIENT
Start: 2018-08-20 | End: 2018-10-01

## 2018-08-20 NOTE — TELEPHONE ENCOUNTER
1. Caller Name:  [id number] - [place], [place] - [address]  [address]  [place] [address]  Phone: [phone] Fax: [phone]          Patient approves a detailed voicemail message: N\A    Pharmacy requested alternate medication due to patient being allergic to Norvasc. Please advise.

## 2018-08-21 NOTE — TELEPHONE ENCOUNTER
Ordered hydrochlorothiazide 25 mg daily per patient request.  She stated that she could not tolerate amlodipine due to insomnia in the past.  She also did not like to take losartan as it caused headache.  She insists to retake hydrochlorothiazide again.    Ruchi Plasencia M.D.

## 2018-09-14 ENCOUNTER — PATIENT MESSAGE (OUTPATIENT)
Dept: MEDICAL GROUP | Age: 73
End: 2018-09-14

## 2018-09-14 DIAGNOSIS — F41.9 ANXIETY: ICD-10-CM

## 2018-09-17 RX ORDER — SERTRALINE HYDROCHLORIDE 25 MG/1
25 TABLET, FILM COATED ORAL
Qty: 90 TAB | Refills: 3 | Status: SHIPPED | OUTPATIENT
Start: 2018-09-17 | End: 2019-08-30

## 2018-09-26 ENCOUNTER — APPOINTMENT (RX ONLY)
Dept: URBAN - METROPOLITAN AREA CLINIC 31 | Facility: CLINIC | Age: 73
Setting detail: DERMATOLOGY
End: 2018-09-26

## 2018-09-26 DIAGNOSIS — L50.1 IDIOPATHIC URTICARIA: ICD-10-CM

## 2018-09-26 DIAGNOSIS — Z85.828 PERSONAL HISTORY OF OTHER MALIGNANT NEOPLASM OF SKIN: ICD-10-CM

## 2018-09-26 DIAGNOSIS — L82.1 OTHER SEBORRHEIC KERATOSIS: ICD-10-CM

## 2018-09-26 DIAGNOSIS — D22 MELANOCYTIC NEVI: ICD-10-CM

## 2018-09-26 DIAGNOSIS — L57.8 OTHER SKIN CHANGES DUE TO CHRONIC EXPOSURE TO NONIONIZING RADIATION: ICD-10-CM

## 2018-09-26 DIAGNOSIS — L81.4 OTHER MELANIN HYPERPIGMENTATION: ICD-10-CM

## 2018-09-26 DIAGNOSIS — Z80.8 FAMILY HISTORY OF MALIGNANT NEOPLASM OF OTHER ORGANS OR SYSTEMS: ICD-10-CM

## 2018-09-26 DIAGNOSIS — D18.0 HEMANGIOMA: ICD-10-CM

## 2018-09-26 DIAGNOSIS — L57.0 ACTINIC KERATOSIS: ICD-10-CM

## 2018-09-26 DIAGNOSIS — L81.0 POSTINFLAMMATORY HYPERPIGMENTATION: ICD-10-CM

## 2018-09-26 PROBLEM — D18.01 HEMANGIOMA OF SKIN AND SUBCUTANEOUS TISSUE: Status: ACTIVE | Noted: 2018-09-26

## 2018-09-26 PROBLEM — D22.5 MELANOCYTIC NEVI OF TRUNK: Status: ACTIVE | Noted: 2018-09-26

## 2018-09-26 PROCEDURE — 99213 OFFICE O/P EST LOW 20 MIN: CPT | Mod: 25

## 2018-09-26 PROCEDURE — 17003 DESTRUCT PREMALG LES 2-14: CPT

## 2018-09-26 PROCEDURE — ? ADDITIONAL NOTES

## 2018-09-26 PROCEDURE — ? COUNSELING

## 2018-09-26 PROCEDURE — 17000 DESTRUCT PREMALG LESION: CPT

## 2018-09-26 PROCEDURE — ? LIQUID NITROGEN

## 2018-09-26 ASSESSMENT — LOCATION DETAILED DESCRIPTION DERM
LOCATION DETAILED: RIGHT PROXIMAL DORSAL FOREARM
LOCATION DETAILED: LEFT DISTAL DORSAL FOREARM
LOCATION DETAILED: RIGHT SUPERIOR MEDIAL MIDBACK
LOCATION DETAILED: LEFT ANTERIOR PROXIMAL THIGH
LOCATION DETAILED: STERNAL NOTCH
LOCATION DETAILED: LEFT SUPERIOR MEDIAL LOWER BACK
LOCATION DETAILED: RIGHT SUPERIOR MEDIAL BUCCAL CHEEK
LOCATION DETAILED: LEFT PROXIMAL DORSAL FOREARM
LOCATION DETAILED: NASAL INFRATIP
LOCATION DETAILED: NASAL SUPRATIP
LOCATION DETAILED: INFERIOR THORACIC SPINE
LOCATION DETAILED: LEFT SUPERIOR MEDIAL BUCCAL CHEEK
LOCATION DETAILED: RIGHT INFERIOR LATERAL NECK
LOCATION DETAILED: LEFT LATERAL TRAPEZIAL NECK

## 2018-09-26 ASSESSMENT — LOCATION SIMPLE DESCRIPTION DERM
LOCATION SIMPLE: RIGHT FOREARM
LOCATION SIMPLE: RIGHT CHEEK
LOCATION SIMPLE: LEFT FOREARM
LOCATION SIMPLE: RIGHT ANTERIOR NECK
LOCATION SIMPLE: LEFT LOWER BACK
LOCATION SIMPLE: NOSE
LOCATION SIMPLE: LEFT THIGH
LOCATION SIMPLE: CHEST
LOCATION SIMPLE: POSTERIOR NECK
LOCATION SIMPLE: LEFT CHEEK
LOCATION SIMPLE: UPPER BACK
LOCATION SIMPLE: RIGHT LOWER BACK

## 2018-09-26 ASSESSMENT — LOCATION ZONE DERM
LOCATION ZONE: NOSE
LOCATION ZONE: LEG
LOCATION ZONE: ARM
LOCATION ZONE: FACE
LOCATION ZONE: NECK
LOCATION ZONE: TRUNK

## 2018-09-29 ENCOUNTER — OFFICE VISIT (OUTPATIENT)
Dept: URGENT CARE | Facility: CLINIC | Age: 73
End: 2018-09-29
Payer: MEDICARE

## 2018-09-29 VITALS
DIASTOLIC BLOOD PRESSURE: 84 MMHG | OXYGEN SATURATION: 97 % | BODY MASS INDEX: 29.66 KG/M2 | RESPIRATION RATE: 14 BRPM | TEMPERATURE: 98.2 F | SYSTOLIC BLOOD PRESSURE: 112 MMHG | WEIGHT: 178 LBS | HEART RATE: 84 BPM | HEIGHT: 65 IN

## 2018-09-29 DIAGNOSIS — J02.9 EXUDATIVE PHARYNGITIS: ICD-10-CM

## 2018-09-29 DIAGNOSIS — J02.9 SORE THROAT: ICD-10-CM

## 2018-09-29 LAB
INT CON NEG: NORMAL
INT CON POS: NORMAL
S PYO AG THROAT QL: NEGATIVE

## 2018-09-29 PROCEDURE — 87880 STREP A ASSAY W/OPTIC: CPT | Performed by: NURSE PRACTITIONER

## 2018-09-29 PROCEDURE — 99214 OFFICE O/P EST MOD 30 MIN: CPT | Performed by: NURSE PRACTITIONER

## 2018-09-29 RX ORDER — IBUPROFEN 200 MG
200 TABLET ORAL EVERY 6 HOURS PRN
COMMUNITY
End: 2018-10-01

## 2018-09-29 RX ORDER — AZITHROMYCIN 250 MG/1
TABLET, FILM COATED ORAL
Qty: 6 TAB | Refills: 0 | Status: SHIPPED | OUTPATIENT
Start: 2018-09-29 | End: 2018-10-01

## 2018-09-29 RX ORDER — ACETAMINOPHEN 500 MG
500 TABLET ORAL EVERY 6 HOURS PRN
COMMUNITY
End: 2020-05-23

## 2018-09-29 ASSESSMENT — ENCOUNTER SYMPTOMS
CHILLS: 1
TROUBLE SWALLOWING: 1
SORE THROAT: 1
SWOLLEN GLANDS: 1
HOARSE VOICE: 1

## 2018-09-29 NOTE — PROGRESS NOTES
Subjective:      Martha Ordonez is a 73 y.o. female who presents with Sore Throat (C/o sore throat, post nasal drip, white pustuls x2 days)            Pharyngitis    This is a new problem. Episode onset: pt reports she developed ST 2 days ago. She admits the pain got significantly worse last night. Denies any fever, + bodyaches/chills. The problem has been gradually worsening. The pain is worse on the right side. The pain is moderate. Associated symptoms include a hoarse voice, swollen glands and trouble swallowing. Associated symptoms comments: + hx of strep. She has had exposure to strep. Exposure to: thinks her granddaughter had it. She has tried acetaminophen and gargles for the symptoms. The treatment provided no relief.       Review of Systems   Constitutional: Positive for chills.   HENT: Positive for hoarse voice, sore throat and trouble swallowing.    All other systems reviewed and are negative.    Past Medical History:   Diagnosis Date   • Abnormal thyroid function test 9/10/2014   • Allergic rhinitis    • Anxiety    • Asthma    • Back pain    • Basal cell carcinoma     squamos cell right ear   • Bronchitis    • Cardiac arrhythmia    • Depression    • Elevated liver enzymes 9/10/2014   • Family history of melanoma    • GERD (gastroesophageal reflux disease)    • Glaucoma suspect of both eyes    • Hyperlipidemia    • Hypertension    • Nasal drainage    • OCD (obsessive compulsive disorder)    • Pancreatitis    • PCOS (polycystic ovarian syndrome)    • S/P ERCP    • Transient global amnesia       Past Surgical History:   Procedure Laterality Date   • ABDOMINAL HYSTERECTOMY TOTAL      ovaries remain   • ADENOIDECTOMY     • CARDIAC CATH, RIGHT HEART      normal, EKG was Abnormal    • CHOLECYSTECTOMY     • DILATION AND CURETTAGE     • EYE SURGERY      b/l iridotomy   • HYSTERECTOMY LAPAROSCOPY     • LUMPECTOMY      benign   • TONSILLECTOMY     • US-NEEDLE CORE BX-BREAST PANEL        Social History     Social  "History   • Marital status:      Spouse name: N/A   • Number of children: N/A   • Years of education: N/A     Occupational History   • RN- retired      Social History Main Topics   • Smoking status: Never Smoker   • Smokeless tobacco: Never Used   • Alcohol use No   • Drug use: No   • Sexual activity: Yes     Partners: Male      Comment: , retired RN     Other Topics Concern   • Not on file     Social History Narrative    , 2 children- daughters.           Objective:     /84 (BP Location: Left arm, Patient Position: Sitting, BP Cuff Size: Adult)   Pulse 84   Temp 36.8 °C (98.2 °F) (Temporal)   Resp 14   Ht 1.651 m (5' 5\")   Wt 80.7 kg (178 lb)   SpO2 97%   Breastfeeding? No   BMI 29.62 kg/m²      Physical Exam   Constitutional: She is oriented to person, place, and time. Vital signs are normal. She appears well-developed and well-nourished.   HENT:   Head: Normocephalic and atraumatic.   Right Ear: Tympanic membrane and external ear normal.   Left Ear: Tympanic membrane and external ear normal.   Nose: Nose normal.   Mouth/Throat: Oropharyngeal exudate and posterior oropharyngeal erythema present. Tonsils are 2+ on the right.   Eyes: Pupils are equal, round, and reactive to light. EOM are normal.   Neck: Normal range of motion.   Cardiovascular: Normal rate and regular rhythm.    Pulmonary/Chest: Effort normal.   Musculoskeletal: Normal range of motion.   Lymphadenopathy:        Head (right side): Submandibular and tonsillar adenopathy present.        Head (left side): Submandibular and tonsillar adenopathy present.   Neurological: She is alert and oriented to person, place, and time.   Skin: Skin is warm and dry. Capillary refill takes less than 2 seconds.   Psychiatric: She has a normal mood and affect. Her speech is normal and behavior is normal. Thought content normal.   Vitals reviewed.              Assessment/Plan:     1. Sore throat  - POCT Rapid Strep A NEGATIVE    2. " Exudative pharyngitis  - azithromycin (ZITHROMAX) 250 MG Tab; Take 2 tabs PO on the first day, then one tab PO daily for 4 days  Dispense: 6 Tab; Refill: 0    Continue warm salt water gargles  Alternate tylenol and ibuprofen as needed for pain  Cool liquids, soft foods  Continue OTC therapies as needed to help alleviate symptoms  Supportive care, differential diagnoses, and indications for immediate follow-up discussed with patient.    Pathogenesis of diagnosis discussed including typical length and natural progression.      Instructed to return to  or nearest emergency department if symptoms fail to improve, for any change in condition, further concerns, or new concerning symptoms.  Patient states understanding of the plan of care and discharge instructions.

## 2018-10-01 ENCOUNTER — HOSPITAL ENCOUNTER (OUTPATIENT)
Facility: MEDICAL CENTER | Age: 73
End: 2018-10-01
Attending: INTERNAL MEDICINE
Payer: MEDICARE

## 2018-10-01 ENCOUNTER — OFFICE VISIT (OUTPATIENT)
Dept: MEDICAL GROUP | Age: 73
End: 2018-10-01
Payer: MEDICARE

## 2018-10-01 VITALS
BODY MASS INDEX: 29.76 KG/M2 | DIASTOLIC BLOOD PRESSURE: 74 MMHG | TEMPERATURE: 99.1 F | HEART RATE: 84 BPM | HEIGHT: 65 IN | OXYGEN SATURATION: 94 % | SYSTOLIC BLOOD PRESSURE: 126 MMHG | WEIGHT: 178.6 LBS

## 2018-10-01 DIAGNOSIS — J02.9 SORE THROAT: ICD-10-CM

## 2018-10-01 DIAGNOSIS — J02.9 ACUTE PHARYNGITIS, UNSPECIFIED ETIOLOGY: ICD-10-CM

## 2018-10-01 DIAGNOSIS — Z23 NEED FOR SHINGLES VACCINE: ICD-10-CM

## 2018-10-01 LAB
INT CON NEG: NEGATIVE
INT CON POS: POSITIVE
S PYO AG THROAT QL: NEGATIVE

## 2018-10-01 PROCEDURE — 87070 CULTURE OTHR SPECIMN AEROBIC: CPT

## 2018-10-01 PROCEDURE — 99214 OFFICE O/P EST MOD 30 MIN: CPT | Performed by: INTERNAL MEDICINE

## 2018-10-01 PROCEDURE — 87880 STREP A ASSAY W/OPTIC: CPT | Performed by: INTERNAL MEDICINE

## 2018-10-01 RX ORDER — AZITHROMYCIN 250 MG/1
TABLET, FILM COATED ORAL
Qty: 6 TAB | Refills: 0 | Status: SHIPPED | OUTPATIENT
Start: 2018-10-01 | End: 2018-10-06

## 2018-10-01 NOTE — ASSESSMENT & PLAN NOTE
This is a new problem to me.  Patient reported that she has extreme pain on her throat for 1 week. She was seen by urgent care on 9/29/18. She has negative strep test in urgent care. She was treated with Z-Romain.  She still had 2 days course of Z-Romain.  She did not feel any improvement currently.  She requested to recheck POCT rapid strep test in clinic as she believed that she might have some other kind of bacterial infection.  She also requested to have throat culture.  She reported fever at home and also having swelling on the her neck gland.  She reported pain when she swallows.  She gargles her throat with warm salt water frequently.  Pain decreased by gargle with warm salt water but symptoms recur a few minutes later.  She denies cough or runny nose or nasal congestion or ear pain or wheezing or shortness of breath or chest pain.

## 2018-10-01 NOTE — PROGRESS NOTES
Subjective:   Martha Ordoenz is a 73 y.o. female here today for evaluation and management of:      Sore throat  This is a new problem to me.  Patient reported that she has extreme pain on her throat for 1 week. She was seen by urgent care on 9/29/18. She has negative strep test in urgent care. She was treated with Z-Romain.  She still had 2 days course of Z-Romain.  She did not feel any improvement currently.  She requested to recheck POCT rapid strep test in clinic as she believed that she might have some other kind of bacterial infection.  She also requested to have throat culture.  She reported fever at home and also having swelling on the her neck gland.  She reported pain when she swallows.  She gargles her throat with warm salt water frequently.  Pain decreased by gargle with warm salt water but symptoms recur a few minutes later.  She denies cough or runny nose or nasal congestion or ear pain or wheezing or shortness of breath or chest pain.         Current medicines (including changes today)  Current Outpatient Prescriptions   Medication Sig Dispense Refill   • azithromycin (ZITHROMAX) 250 MG Tab 2 tabs by mouth day 1, 1 tab by mouth days 2-5 6 Tab 0   • Zoster Vac Recomb Adjuvanted (SHINGRIX) 50 MCG Recon Susp 0.5 mL by Intramuscular route Once for 1 dose. 0.5 mL 0   • Alum & Mag Hydroxide-Simeth (MAALOX PLUS-BENADRYL-XYLOCAINE) Take 5 mL by mouth every 6 hours as needed. 1 Bottle 0   • acetaminophen (TYLENOL) 500 MG Tab Take 500-1,000 mg by mouth every 6 hours as needed.     • sertraline (ZOLOFT) 25 MG tablet Take 1 Tab by mouth every day. 90 Tab 3   • amLODIPine (NORVASC) 2.5 MG Tab Take 1 Tab by mouth 1 time daily as needed. 90 Tab 3   • Coenzyme Q10 100 MG Cap Take 300 mg by mouth.     • Cholecalciferol (VITAMIN D) 2000 UNIT Tab Take 2,000 Units by mouth.     • albuterol (VENTOLIN HFA) 108 (90 Base) MCG/ACT Aero Soln inhalation aerosol Inhale 2 Puffs by mouth every 6 hours as needed. 18 Inhaler 3   •  "losartan (COZAAR) 25 MG Tab Take 1 Tab by mouth every day. 90 Tab 3   • raNITidine (ZANTAC) 150 MG Tab Take 1 Tab by mouth 2 times a day. 180 Tab 3   • simvastatin (ZOCOR) 10 MG Tab Take 1 Tab by mouth every evening. 90 Tab 3   • aspirin EC (ECOTRIN) 81 MG Tablet Delayed Response Take 81 mg by mouth.     • fluticasone (FLONASE) 50 MCG/ACT nasal spray Spray 1 Spray in nose as needed.     • cetirizine (ZYRTEC) 10 MG TABS Take 10 mg by mouth every day.       No current facility-administered medications for this visit.      She  has a past medical history of Abnormal thyroid function test (9/10/2014); Allergic rhinitis; Anxiety; Asthma; Back pain; Basal cell carcinoma; Bronchitis; Cardiac arrhythmia; Depression; Elevated liver enzymes (9/10/2014); Family history of melanoma; GERD (gastroesophageal reflux disease); Glaucoma suspect of both eyes; Hyperlipidemia; Hypertension; Nasal drainage; OCD (obsessive compulsive disorder); Pancreatitis; PCOS (polycystic ovarian syndrome); S/P ERCP; and Transient global amnesia.    ROS   Positive for sore throat.  No chest pain, no shortness of breath, no abdominal pain       Objective:     Blood pressure 126/74, pulse 84, temperature 37.3 °C (99.1 °F), temperature source Temporal, height 1.651 m (5' 5\"), weight 81 kg (178 lb 9.6 oz), SpO2 94 %, not currently breastfeeding. Body mass index is 29.72 kg/m².   Physical Exam:  General: Alert, oriented and no acute distress.  Eye contact is good, speech goal directed, affect calm  HEENT: conjunctiva non-injected, sclera non-icteric.  Oral mucous membranes pink and moist with no lesions.  Erythema on uvula and oropharynx with white spots on uvula.   Pinna normal. TM pearly gray.   Neck No supraclavicular, submandibular, submental lymphadenopathy or masses in the neck or supraclavicular regions.  Lungs: Normal respiratory effort, clear to auscultation bilaterally with good excursion.  CV: regular rate and rhythm. No murmurs.   Abdomen: soft, " non distended, nontender, Bowel sound normal.  Ext: no edema, color normal, vascularity normal, temperature normal        Assessment and Plan:   The following treatment plan was discussed     1. Sore throat  - Prescribed Maalox plus Benadryl-Xylocaine mouthwash to use 5 mL every 6 hours as needed to decrease salt throat.  Advised to increase water intake and fluid intake.  Recommend to avoid eating greasy food or spicy food or alcohol.  - POCT Rapid Strep A  - CULTURE THROAT; Future  - azithromycin (ZITHROMAX) 250 MG Tab; 2 tabs by mouth day 1, 1 tab by mouth days 2-5  Dispense: 6 Tab; Refill: 0  - Alum & Mag Hydroxide-Simeth (MAALOX PLUS-BENADRYL-XYLOCAINE); Take 5 mL by mouth every 6 hours as needed.  Dispense: 1 Bottle; Refill: 0    2. Acute pharyngitis, unspecified etiology  - No signs of airway obstruction or acute respiratory distress.  - POCT rapid strep test was negative in clinic today.  She would like to do a throat culture as her symptom did not improve with Z-Romain.  Patient has allergies to multiple antibiotics including penicillin, cephalosporin, ciprofloxacin.  Patient stated that she wanted to avoid all dose antibiotics as she did not want to have any chance of reaction.  She also declined to take clindamycin as an alternate treatment as she has severe diarrhea from taking it in the past.  She did not like to take doxycycline as well as is not first-line treatment for pharyngitis.  And she also reported that she has yeast infection from taking doxycycline.  Patient would like to try another 5 days course of Z-Romain to see that can decrease her symptoms.  - Patient is advised to return to clinic if her symptoms do not improved.  - Obtained throat culture and sent to lab.  Will advise for result.  - CULTURE THROAT; Future  - azithromycin (ZITHROMAX) 250 MG Tab; 2 tabs by mouth day 1, 1 tab by mouth days 2-5  Dispense: 6 Tab; Refill: 0  - Alum & Mag Hydroxide-Simeth (MAALOX PLUS-BENADRYL-XYLOCAINE); Take 5  mL by mouth every 6 hours as needed.  Dispense: 1 Bottle; Refill: 0    3. Need for shingles vaccine  - Patient requested to prescribe shingrix vaccine to receive it from pharmacy.  She is advised to hold off receiving any vaccine until she fully recovers from pharyngitis.  I printed out shingrix vaccine for her to receive in future.  - Zoster Vac Recomb Adjuvanted (SHINGRIX) 50 MCG Recon Susp; 0.5 mL by Intramuscular route Once for 1 dose.  Dispense: 0.5 mL; Refill: 0      - Discussed ED precautions with patient: seek emergency evaluation for symptoms including but not limited to: worsening symptoms or developing any new symptoms, crushing chest pain, chest pain associated with difficulty breathing, nausea, or sweats, heart rate irregular or too fast to count.   - Any change or worsening of signs or symptoms, patient encouraged to follow-up or report to emergency room for further evaluation. Patient understands and agrees       Followup: Return if symptoms worsen or fail to improve.      Please note that this dictation was created using voice recognition software. I have made every reasonable attempt to correct obvious errors, but I expect that there may have unintended errors in text, spelling, punctuation, or grammar that I did not discover.

## 2018-10-04 ENCOUNTER — TELEPHONE (OUTPATIENT)
Dept: MEDICAL GROUP | Age: 73
End: 2018-10-04

## 2018-10-04 ENCOUNTER — PATIENT MESSAGE (OUTPATIENT)
Dept: MEDICAL GROUP | Age: 73
End: 2018-10-04

## 2018-10-04 DIAGNOSIS — B37.0 ORAL PHARYNGEAL CANDIDIASIS: ICD-10-CM

## 2018-10-04 DIAGNOSIS — B37.31 VAGINAL YEAST INFECTION: ICD-10-CM

## 2018-10-04 LAB
BACTERIA SPEC RESP CULT: NORMAL
SIGNIFICANT IND 70042: NORMAL
SITE SITE: NORMAL
SOURCE SOURCE: NORMAL

## 2018-10-04 RX ORDER — FLUCONAZOLE 150 MG/1
150 TABLET ORAL DAILY
Qty: 2 TAB | Refills: 0 | Status: SHIPPED | OUTPATIENT
Start: 2018-10-04 | End: 2018-10-05

## 2018-10-04 NOTE — TELEPHONE ENCOUNTER
Patient informed of results note.     However, she states she feels vaginal itching probably due two course to antibiotics. She requests the antifungal medication for that as well.   Please advise.

## 2018-10-04 NOTE — TELEPHONE ENCOUNTER
----- Message from Ruchi Plasencia M.D. sent at 10/4/2018  8:15 AM PDT -----  Please call to inform patient that the throat culture was negative for bacteria that can cause infection.  However cultures showed that she has moderate growth of normal upper respiratory tract bacterial marylu including yeast.  Normal bacterial marylu does not cause infection or inflammation.  However yeast may cause inflammation of the throat.  Please asked patient how she feels with the second course of antibiotic and prescription mouthwash.  If she is still feeling pain and discomfort, I will prescribe antifungal medication for her.    Ruchi Plasencia M.D.

## 2018-10-04 NOTE — TELEPHONE ENCOUNTER
Please inform patient that I sent prescription Diflucan 150 mg 1 tab to pharmacy.  She can take 1 dose of Diflucan for vaginal yeast infection and she can have second dose in 72-hour if symptoms do not improved.    Ruchi Plasencia M.D.

## 2018-10-05 NOTE — PATIENT COMMUNICATION
Patient has oral pharyngeal candidiasis.  Prescribed nystatin solution to use 5 ml for swish and spit 4 times a day for 2 weeks.  Patient was informed through my chart.    Ruchi Plasencia M.D.

## 2019-01-02 ENCOUNTER — HOSPITAL ENCOUNTER (OUTPATIENT)
Dept: LAB | Facility: MEDICAL CENTER | Age: 74
End: 2019-01-02
Attending: INTERNAL MEDICINE
Payer: MEDICARE

## 2019-01-02 DIAGNOSIS — I10 ESSENTIAL HYPERTENSION: ICD-10-CM

## 2019-01-02 DIAGNOSIS — J45.20 MILD INTERMITTENT ASTHMA WITHOUT COMPLICATION: ICD-10-CM

## 2019-01-02 DIAGNOSIS — E55.9 VITAMIN D INSUFFICIENCY: ICD-10-CM

## 2019-01-02 DIAGNOSIS — E78.00 PURE HYPERCHOLESTEROLEMIA: ICD-10-CM

## 2019-01-02 LAB
25(OH)D3 SERPL-MCNC: 51 NG/ML (ref 30–100)
ALBUMIN SERPL BCP-MCNC: 4.3 G/DL (ref 3.2–4.9)
ALBUMIN/GLOB SERPL: 1.5 G/DL
ALP SERPL-CCNC: 115 U/L (ref 30–99)
ALT SERPL-CCNC: 21 U/L (ref 2–50)
ANION GAP SERPL CALC-SCNC: 8 MMOL/L (ref 0–11.9)
AST SERPL-CCNC: 25 U/L (ref 12–45)
BILIRUB SERPL-MCNC: 1 MG/DL (ref 0.1–1.5)
BUN SERPL-MCNC: 12 MG/DL (ref 8–22)
CALCIUM SERPL-MCNC: 10.1 MG/DL (ref 8.5–10.5)
CHLORIDE SERPL-SCNC: 105 MMOL/L (ref 96–112)
CHOLEST SERPL-MCNC: 207 MG/DL (ref 100–199)
CO2 SERPL-SCNC: 27 MMOL/L (ref 20–33)
CREAT SERPL-MCNC: 0.58 MG/DL (ref 0.5–1.4)
FASTING STATUS PATIENT QL REPORTED: NORMAL
GLOBULIN SER CALC-MCNC: 2.9 G/DL (ref 1.9–3.5)
GLUCOSE SERPL-MCNC: 98 MG/DL (ref 65–99)
HDLC SERPL-MCNC: 62 MG/DL
LDLC SERPL CALC-MCNC: 124 MG/DL
POTASSIUM SERPL-SCNC: 4 MMOL/L (ref 3.6–5.5)
PROT SERPL-MCNC: 7.2 G/DL (ref 6–8.2)
SODIUM SERPL-SCNC: 140 MMOL/L (ref 135–145)
TRIGL SERPL-MCNC: 104 MG/DL (ref 0–149)

## 2019-01-02 PROCEDURE — 36415 COLL VENOUS BLD VENIPUNCTURE: CPT

## 2019-01-02 PROCEDURE — 82306 VITAMIN D 25 HYDROXY: CPT

## 2019-01-02 PROCEDURE — 80061 LIPID PANEL: CPT

## 2019-01-02 PROCEDURE — 80053 COMPREHEN METABOLIC PANEL: CPT

## 2019-01-16 ENCOUNTER — OFFICE VISIT (OUTPATIENT)
Dept: MEDICAL GROUP | Age: 74
End: 2019-01-16
Payer: MEDICARE

## 2019-01-16 VITALS
TEMPERATURE: 97.7 F | WEIGHT: 171.4 LBS | DIASTOLIC BLOOD PRESSURE: 72 MMHG | SYSTOLIC BLOOD PRESSURE: 108 MMHG | BODY MASS INDEX: 28.56 KG/M2 | OXYGEN SATURATION: 100 % | HEART RATE: 66 BPM | HEIGHT: 65 IN

## 2019-01-16 DIAGNOSIS — I10 ESSENTIAL HYPERTENSION: ICD-10-CM

## 2019-01-16 DIAGNOSIS — E55.9 VITAMIN D INSUFFICIENCY: ICD-10-CM

## 2019-01-16 DIAGNOSIS — G89.29 CHRONIC RIGHT-SIDED LOW BACK PAIN WITH RIGHT-SIDED SCIATICA: ICD-10-CM

## 2019-01-16 DIAGNOSIS — E78.00 PURE HYPERCHOLESTEROLEMIA: ICD-10-CM

## 2019-01-16 DIAGNOSIS — M54.41 CHRONIC RIGHT-SIDED LOW BACK PAIN WITH RIGHT-SIDED SCIATICA: ICD-10-CM

## 2019-01-16 DIAGNOSIS — R10.2 PELVIC PAIN: ICD-10-CM

## 2019-01-16 DIAGNOSIS — R79.89 INCREASED PTH LEVEL: ICD-10-CM

## 2019-01-16 PROBLEM — N64.4 BREAST PAIN IN FEMALE: Status: RESOLVED | Noted: 2018-06-21 | Resolved: 2019-01-16

## 2019-01-16 PROBLEM — R00.2 PALPITATIONS: Status: RESOLVED | Noted: 2017-06-06 | Resolved: 2019-01-16

## 2019-01-16 PROBLEM — J02.9 SORE THROAT: Status: RESOLVED | Noted: 2018-10-01 | Resolved: 2019-01-16

## 2019-01-16 PROCEDURE — 99214 OFFICE O/P EST MOD 30 MIN: CPT | Performed by: INTERNAL MEDICINE

## 2019-01-16 RX ORDER — LATANOPROST 50 UG/ML
SOLUTION/ DROPS OPHTHALMIC
Refills: 3 | COMMUNITY
Start: 2019-01-11 | End: 2019-05-22

## 2019-01-16 RX ORDER — ALBUTEROL SULFATE 90 UG/1
1 AEROSOL, METERED RESPIRATORY (INHALATION)
COMMUNITY
End: 2019-01-16

## 2019-01-16 RX ORDER — HYDROCHLOROTHIAZIDE 25 MG/1
1 TABLET ORAL
COMMUNITY
Start: 2017-12-24 | End: 2019-01-16

## 2019-01-16 ASSESSMENT — PATIENT HEALTH QUESTIONNAIRE - PHQ9: CLINICAL INTERPRETATION OF PHQ2 SCORE: 0

## 2019-01-16 NOTE — ASSESSMENT & PLAN NOTE
This is a new problem to me.  Patient reported having lower back pain radiated to right gluteal muscle and right groin.  She started having right lower back pain since October 2018.  She stated that her pain is dull in nature and improved by applying heating pad, massage and stretching exercise.  I discussed physical therapy, anti-inflammation medication and to do x-ray for assessment and treatment.  Patient preferred to refer to physiatry and declined to order x-ray or prescribe medication for her.

## 2019-01-16 NOTE — ASSESSMENT & PLAN NOTE
Patient follows with endocrinologist in [facility].  She does not have any symptoms currently.  She stated that she will have follow-up appointment with endocrinologist around October or November 2019.  She is concerning of her calcium slightly increased from 9.5 to 10.1 on 1/2/19.  Patient requested to repeat chemistry panel and parathyroid hormone in 3 months to closely monitor.

## 2019-01-16 NOTE — ASSESSMENT & PLAN NOTE
Patient stated that her blood pressure improved a lot after she avoids eating meat.  She still eats eggs and she is not strict vegetarian.  She is currently taking losartan 25 mg daily and her blood pressure is well controlled.  She does not require to take amlodipine which is prescribed to take as needed if her blood pressure is above 150/90.  Patient is advised to closely monitor her blood pressure and she can cut down losartan to half dose if her blood pressure is less than 110/60.  She agreed with the plan.  Her recent CMP on 1/2/19 showed normal electrolytes and kidney function.  She has slightly elevated alkaline phosphatase which is chronic and stable.

## 2019-01-16 NOTE — ASSESSMENT & PLAN NOTE
Patient is taking vitamin D 4000 units daily.  Her vitamin D level was stable and well controlled with current regimens.    Results for SVETA DEB PETERSEN (MRN [id number]) as of 1/16/2019 12:29   Ref. Range 1/2/2019 08:10   25-Hydroxy   Vitamin D 25 Latest Ref Range: 30 - 100 ng/mL 51

## 2019-01-16 NOTE — PROGRESS NOTES
Subjective:   Deb Bangura is a 73 y.o. female here today for evaluation and management of:      Vitamin D insufficiency  Patient is taking vitamin D 4000 units daily.  Her vitamin D level was stable and well controlled with current regimens.    Results for DEB BANGURA (MRN [id number]) as of 1/16/2019 12:29   Ref. Range 1/2/2019 08:10   25-Hydroxy   Vitamin D 25 Latest Ref Range: 30 - 100 ng/mL 51       Pure hypercholesterolemia  Patient stated that she did not want to take any statin for her cholesterol.  She preferred to control her cholesterol with diet and physical exercise.  She stated that she is eating more vegetables and do regular physical exercise.  She did not want to have any potential side effects from taking statin.  She has elevated alkaline phosphatase chronically and she believed that it is caused by statin.  She has fatty liver found in ultrasound on 12/18/17 and I reviewed ultrasound report with her in clinic today.  She stated that she still do not want to take any prescription medication of statin.  She understands the risks of high cholesterol and consequence of her high cholesterol.  She preferred to control her cholesterol with nonpharmacological treatment.    Pelvic pain  Patient reported pressure on her pelvis for 3 months.  She also noticed frequent urination but no blood in urine or no pain or burning during urination.  She did not have vaginal discharge or spotting.  She stated that her pressure pain on pelvic radiated to lower back and right groin.  She did not want to take any medication for her bladder.  She wanted to make sure that she does not have ovarian tumor.  Patient stated that she had vaginal hysterectomy around age 41 but she still has both ovaries.  So she concerned of having ovarian tumor or any abnormality.    Essential hypertension  Patient stated that her blood pressure improved a lot after she avoids eating meat.  She still eats eggs and she is not strict  vegetarian.  She is currently taking losartan 25 mg daily and her blood pressure is well controlled.  She does not require to take amlodipine which is prescribed to take as needed if her blood pressure is above 150/90.  Patient is advised to closely monitor her blood pressure and she can cut down losartan to half dose if her blood pressure is less than 110/60.  She agreed with the plan.  Her recent CMP on 1/2/19 showed normal electrolytes and kidney function.  She has slightly elevated alkaline phosphatase which is chronic and stable.    Increased PTH level  Patient follows with endocrinologist in [facility].  She does not have any symptoms currently.  She stated that she will have follow-up appointment with endocrinologist around October or November 2019.  She is concerning of her calcium slightly increased from 9.5 to 10.1 on 1/2/19.  Patient requested to repeat chemistry panel and parathyroid hormone in 3 months to closely monitor.    Chronic right-sided low back pain with right-sided sciatica  This is a new problem to me.  Patient reported having lower back pain radiated to right gluteal muscle and right groin.  She started having right lower back pain since October 2018.  She stated that her pain is dull in nature and improved by applying heating pad, massage and stretching exercise.  I discussed physical therapy, anti-inflammation medication and to do x-ray for assessment and treatment.  Patient preferred to refer to physiatry and declined to order x-ray or prescribe medication for her.         Current medicines (including changes today)  Current Outpatient Prescriptions   Medication Sig Dispense Refill   • latanoprost (XALATAN) 0.005 % Solution INSTILL ONE DROP IN BOTH EYES AT BEDTIME  3   • VOLTAREN 1 % Gel APPLY TO AFFECTED AREA 2 TO 3 TIMES PER DAY AS NEEDED FOR PAIN  0   • acetaminophen (TYLENOL) 500 MG Tab Take 500 mg by mouth every 6 hours as needed.     • sertraline (ZOLOFT) 25 MG tablet Take 1 Tab by mouth  "every day. 90 Tab 3   • amLODIPine (NORVASC) 2.5 MG Tab Take 1 Tab by mouth 1 time daily as needed. 90 Tab 3   • Cholecalciferol (VITAMIN D) 2000 UNIT Tab Take 4,000 Units by mouth.     • albuterol (VENTOLIN HFA) 108 (90 Base) MCG/ACT Aero Soln inhalation aerosol Inhale 2 Puffs by mouth every 6 hours as needed. 18 Inhaler 3   • losartan (COZAAR) 25 MG Tab Take 1 Tab by mouth every day. 90 Tab 3   • raNITidine (ZANTAC) 150 MG Tab Take 1 Tab by mouth 2 times a day. 180 Tab 3   • aspirin EC (ECOTRIN) 81 MG Tablet Delayed Response Take 81 mg by mouth.     • cetirizine (ZYRTEC) 10 MG TABS Take 10 mg by mouth 1 time daily as needed.       No current facility-administered medications for this visit.      She  has a past medical history of Abnormal thyroid function test (9/10/2014); Allergic rhinitis; Anxiety; Asthma; Back pain; Basal cell carcinoma; Bronchitis; Cardiac arrhythmia; Depression; Elevated liver enzymes (9/10/2014); Family history of melanoma; GERD (gastroesophageal reflux disease); Glaucoma suspect of both eyes; Hyperlipidemia; Hypertension; Nasal drainage; OCD (obsessive compulsive disorder); Pancreatitis; PCOS (polycystic ovarian syndrome); S/P ERCP; and Transient global amnesia.    ROS   No chest pain, no shortness of breath, no abdominal pain       Objective:     Blood pressure 108/72, pulse 66, temperature 36.5 °C (97.7 °F), temperature source Temporal, height 1.651 m (5' 5\"), weight 77.7 kg (171 lb 6.4 oz), SpO2 100 %, not currently breastfeeding. Body mass index is 28.52 kg/m².   Physical Exam:  General: Alert, oriented and no acute distress.  Eye contact is good, speech goal directed, affect calm  HEENT: conjunctiva non-injected, sclera non-icteric.  Oral mucous membranes pink and moist with no lesions.  Pinna normal.  Lungs: Normal respiratory effort, clear to auscultation bilaterally with good excursion.  CV: regular rate and rhythm. No murmurs.   Abdomen: soft, non distended, nontender, Bowel sound " normal.  Ext: no edema, color normal, vascularity normal, temperature normal        Assessment and Plan:   The following treatment plan was discussed     1. Chronic right-sided low back pain with right-sided sciatica  - Patient declined to do x-ray on the back and hip and declined to refer to physical therapy.  She does not want to take any anti-inflammation medication.  She preferred to see physiatry only.  Refer to physiatry.  Patient is advised to continue gentle stretching exercise and heating pad.  She can take Tylenol 500 mg 3 times daily as needed for pain.  - REFERRAL TO PHYSIATRY (PMR)    2. Pure hypercholesterolemia  - Patient declined to be treated with statin or any prescription medication.  She stated understanding the consequence of high cholesterol.  She preferred nonpharmacological treatment.  She would like to continue to control cholesterol with diet and physical exercise.  - Advised to eat low fat, low carbohydrate and high fiber diet as well as do cardio physical exercise regularly.  - COMP METABOLIC PANEL; Future  - Lipid Profile; Future    3. Essential hypertension  - Well-controlled. Continue current regimens, losartan 25 mg daily.  Please see HPI for detailed discussion.  Recheck lab 1-2 weeks before next follow up visit.  - Recommend to monitor blood pressure and heart rate at home.  - COMP METABOLIC PANEL; Future    4. Vitamin D insufficiency  - Continue vitamin D 4000 units daily.  Will recheck vitamin D in 3 months.  - VITAMIN D,25 HYDROXY; Future    5. Increased PTH level  - Patient would like to repeat parathyroid hormone test in 3 months for follow-up as she is concerning of her calcium level increased to 10.1 on 1/2/19.  She is asymptomatic currently.  - PTH INTACT (PTH ONLY); Future    6. Pelvic pain  - Ordered pelvic ultrasound for further evaluation.  Patient does not have dysuria.  She has more frequency of urination in the past 3 months.  It appears to be that she has overactive  bladder.  She does not want to take any medication for her bladder currently.  She will continue to do Kegel exercise.  She wants to do pelvic ultrasound to rule out ovarian pathology or ovarian tumor.  - US-PELVIC TRANSVAGINAL ONLY; Future        Followup: Return in about 3 months (around 4/16/2019), or if symptoms worsen or fail to improve, for Hypertension, Hyperlipidemia, Vitamin D insufficiency, Lab review.      Please note that this dictation was created using voice recognition software. I have made every reasonable attempt to correct obvious errors, but I expect that there may have unintended errors in text, spelling, punctuation, or grammar that I did not discover.

## 2019-01-16 NOTE — ASSESSMENT & PLAN NOTE
Patient stated that she did not want to take any statin for her cholesterol.  She preferred to control her cholesterol with diet and physical exercise.  She stated that she is eating more vegetables and do regular physical exercise.  She did not want to have any potential side effects from taking statin.  She has elevated alkaline phosphatase chronically and she believed that it is caused by statin.  She has fatty liver found in ultrasound on 12/18/17 and I reviewed ultrasound report with her in clinic today.  She stated that she still do not want to take any prescription medication of statin.  She understands the risks of high cholesterol and consequence of her high cholesterol.  She preferred to control her cholesterol with nonpharmacological treatment.

## 2019-01-16 NOTE — ASSESSMENT & PLAN NOTE
Patient reported pressure on her pelvis for 3 months.  She also noticed frequent urination but no blood in urine or no pain or burning during urination.  She did not have vaginal discharge or spotting.  She stated that her pressure pain on pelvic radiated to lower back and right groin.  She did not want to take any medication for her bladder.  She wanted to make sure that she does not have ovarian tumor.  Patient stated that she had vaginal hysterectomy around age 41 but she still has both ovaries.  So she concerned of having ovarian tumor or any abnormality.

## 2019-01-24 ENCOUNTER — APPOINTMENT (OUTPATIENT)
Dept: RADIOLOGY | Facility: MEDICAL CENTER | Age: 74
End: 2019-01-24
Attending: INTERNAL MEDICINE
Payer: MEDICARE

## 2019-01-26 ENCOUNTER — PATIENT MESSAGE (OUTPATIENT)
Dept: MEDICAL GROUP | Age: 74
End: 2019-01-26

## 2019-01-28 ENCOUNTER — TELEPHONE (OUTPATIENT)
Dept: MEDICAL GROUP | Age: 74
End: 2019-01-28

## 2019-01-28 DIAGNOSIS — J45.20 MILD INTERMITTENT ASTHMA WITHOUT COMPLICATION: ICD-10-CM

## 2019-01-28 RX ORDER — ALBUTEROL SULFATE 2.5 MG/3ML
2.5 SOLUTION RESPIRATORY (INHALATION) EVERY 6 HOURS PRN
Qty: 30 BULLET | Refills: 3 | Status: SHIPPED
Start: 2019-01-28 | End: 2019-02-07 | Stop reason: SDUPTHER

## 2019-01-28 NOTE — PATIENT COMMUNICATION
Please call to inform patient that which company we need to send for albuterol nebulizer.  I already prescribed albuterol nebulizer medication and machine and prescriptions were printed out.     Ruchi Plasencia M.D.

## 2019-01-28 NOTE — TELEPHONE ENCOUNTER
Regarding: Non-Urgent Medical Question  Contact: [phone]  ----- Message from Leda Camarena [facility] sent at 1/28/2019  7:16 AM PST -----  Orders requested, please advise     ----- Message from Martha Ordonez to Ruchi Plasencia M.D. sent at 1/26/2019 11:00 AM -----   Dr. Plasencia,  I would like to purchase a home nebulizer to use with albuterol when I have an asthma flare. The company that sells the nebulizer requires a prescription. Would you write a prescription for me. I will also need a prescription for the albuterol ampules. Whenever I get a cold, I have lots of wheezing and think the nebulizer would be more beneficial than my regular inhaler.  Thank you.  Martha Ordonez

## 2019-01-28 NOTE — TELEPHONE ENCOUNTER
From: Martha Ordonez  To: Ruchi Plasencia M.D.  Sent: 1/26/2019 11:00 AM PST  Subject: Non-Urgent Medical Question    Dr. Plasencia,  I would like to purchase a home nebulizer to use with albuterol when I have an asthma flare. The company that sells the nebulizer requires a prescription. Would you write a prescription for me. I will also need a prescription for the albuterol ampules. Whenever I get a cold, I have lots of wheezing and think the nebulizer would be more beneficial than my regular inhaler.  Thank you.  Martha Ordonez

## 2019-01-30 ENCOUNTER — TELEPHONE (OUTPATIENT)
Dept: MEDICAL GROUP | Age: 74
End: 2019-01-30

## 2019-01-30 DIAGNOSIS — J45.20 MILD INTERMITTENT ASTHMA WITHOUT COMPLICATION: ICD-10-CM

## 2019-01-30 NOTE — TELEPHONE ENCOUNTER
MEDICATION PRIOR AUTHORIZATION NEEDED:    1. Name of Medication: Albuterol Sulfate    2. Requested By (Name of Pharmacy): [facility]      3. Is insurance on file current? yes    4. What is the name & phone number of the 3rd party payor? Medicare    WOULD YOU LIKE A PAR STARTED?

## 2019-01-30 NOTE — TELEPHONE ENCOUNTER
Please start PAR.    Ruchi Plasencia M.D.     Procedure(s): RIGHT THUMB BASAL JOINT ARTHROPLASTY (AX BLOCK). Anesthesia Post Evaluation Multimodal analgesia: multimodal analgesia used between 6 hours prior to anesthesia start to PACU discharge Patient location during evaluation: bedside Patient participation: complete - patient participated Level of consciousness: awake and alert Pain score: 0 Airway patency: patent Anesthetic complications: no 
Cardiovascular status: acceptable Respiratory status: acceptable Hydration status: acceptable Comments: Pt has supraclavicular block. Sling postop until block resolves. Post anesthesia nausea and vomiting:  none Visit Vitals BP (!) 160/92 Pulse 84 Temp 36.4 °C (97.5 °F) Resp 18 Ht 5' 9\" (1.753 m) Wt 75.8 kg (167 lb) SpO2 (!) 9% BMI 24.66 kg/m²

## 2019-01-31 NOTE — TELEPHONE ENCOUNTER
DOCUMENTATION OF PAR STATUS:    1. Name of Medication & Dose: Albuterol Sulfate Nebulizer Solution     2. Name of Prescription Coverage Company & phone #: Medicare    3. Date Prior Auth Submitted: 01/31/2019    4. What information was given to obtain insurance decision? Diagnoses Code    5. Prior Auth Status? Pending    6. Patient Notified: no      [Key: [id number]]

## 2019-02-05 NOTE — TELEPHONE ENCOUNTER
FINAL PRIOR AUTHORIZATION STATUS:    1.  Name of Medication & Dose: albuterol (PROVENTIL) 2.5mg/3ml Nebu Soln solution for nebulization     2. Prior Auth Status: Denied.  Reason: Drugs used in a nebulizer at your home are covered under Medicare Part B. Since you are using this medication with a nebulizer in your home, it is  covered under Medicare Part B. Your Medicare Part D drug plan cannot cover a drug already covered by Medicare Part B.    3. Action Taken: Pharmacy Notified: yes Patient Notified: N\A    Pharmacy can bill Part B to have this medication covered, but a new RX must be faxed with the following information:   New RX with ICD10, Directions, quantity

## 2019-02-07 RX ORDER — ALBUTEROL SULFATE 2.5 MG/3ML
2.5 SOLUTION RESPIRATORY (INHALATION) EVERY 6 HOURS PRN
Qty: 30 BULLET | Refills: 3 | Status: SHIPPED | OUTPATIENT
Start: 2019-02-07 | End: 2020-05-23

## 2019-02-07 NOTE — TELEPHONE ENCOUNTER
Rx for Proventil resent to the pharmacy with ICD 10.  Please advise pharmacy to bill under Medicare part B.  Thank you

## 2019-02-11 NOTE — TELEPHONE ENCOUNTER
Phone Number Called:   [id number] - [place], [place] - [address]  [address]  [place] [address]  Phone: [phone] Fax: [phone]        Message: Called pharmacy let them know of message below.     Left Message for patient to call back: N\A

## 2019-02-12 ENCOUNTER — OFFICE VISIT (OUTPATIENT)
Dept: PHYSICAL MEDICINE AND REHAB | Facility: MEDICAL CENTER | Age: 74
End: 2019-02-12
Payer: MEDICARE

## 2019-02-12 VITALS
OXYGEN SATURATION: 93 % | SYSTOLIC BLOOD PRESSURE: 124 MMHG | WEIGHT: 168.87 LBS | HEIGHT: 65 IN | DIASTOLIC BLOOD PRESSURE: 86 MMHG | HEART RATE: 66 BPM | TEMPERATURE: 97.4 F | BODY MASS INDEX: 28.14 KG/M2

## 2019-02-12 DIAGNOSIS — M54.50 RIGHT-SIDED LOW BACK PAIN WITHOUT SCIATICA, UNSPECIFIED CHRONICITY: ICD-10-CM

## 2019-02-12 DIAGNOSIS — M53.3 SACROILIAC JOINT DYSFUNCTION: ICD-10-CM

## 2019-02-12 PROCEDURE — 99205 OFFICE O/P NEW HI 60 MIN: CPT | Performed by: PHYSICAL MEDICINE & REHABILITATION

## 2019-02-12 ASSESSMENT — PAIN SCALES - GENERAL: PAINLEVEL: 4=SLIGHT-MODERATE PAIN

## 2019-02-12 ASSESSMENT — PATIENT HEALTH QUESTIONNAIRE - PHQ9: CLINICAL INTERPRETATION OF PHQ2 SCORE: 0

## 2019-02-12 NOTE — PROGRESS NOTES
New patient note    Physiatry (physical medicine and  Rehabilitation), interventional spine and sports medicine    Date of Service: 2/12/2019    Chief complaint: Low back pain    HISTORY    HPI: Marhta Ordonez 73 y.o. female who presents today for evaluation of right gluteal region.  This started in 2016 after she fell over a box.   Her neck hyperextended and then she hit her right knee.  After a few days, she started to have pain in this gluteal region.  Since that time, she has had intermittent issues with pain in the right gluteal region.    She had physical therapy in 2016 without help.  Pain is dull and achy.  It seems like it comes and goes.  Sitting, bending, crossing legs makes it worse.  Standing and walking helps with pain.  Massage therapy does help, she does this once a week.  Tylenol helps somewhat.    No numbness or tingling in the legs.  No functional limitations based on these symptoms.  So far, she has not had any particular imaging or other treatments.    She does report that she has some issues with right upper groin pain, although it is not clear that this is related.       She had cortisone injections for tendonitis in the right arm.  She had hives after each episode.    Medical records review:  I reviewed the note from the referring provider Ruchi Plasencia M.D. dated 01/16/2019.  At that visit, she was seen for vitamin D insufficiency, hypercholesterolemia, pelvic pain, hypertension, increased PTH, and chronic right-sided low back pain.   This was noted to start around October 2018 for this episode.  She has tried head, massage and stretching with some improvement.  At that time referral was made here and the patient had declined xrays.    Previous treatments:    Physical Therapy: Yes    Medications the patient is tried: tylenol    Previous interventions: none     Previous surgeries to relieve the above pain:  none      ROS:   Eyes:vision problems, glaucoma  CV:irregular heart  rate  Resp:asthma  GI:GERD, pancreatitis  Psych:anxiety-induced depression  Endo:hyperparathryoidism?  Being followed by Endo    Red Flags ROS:   Fever, Chills, Sweats: Denies  Involuntary Weight Loss: Denies  Bladder Incontinence: Denies  Bowel Incontinence: denies  Saddle Anesthesia: Denies    All other systems reviewed and negative.       PMHx:   Past Medical History:   Diagnosis Date   • Abnormal thyroid function test 9/10/2014   • Allergic rhinitis    • Anxiety    • Asthma    • Back pain    • Basal cell carcinoma     squamos cell right ear   • Bronchitis    • Cardiac arrhythmia    • Depression    • Elevated liver enzymes 9/10/2014   • Family history of melanoma    • GERD (gastroesophageal reflux disease)    • Glaucoma suspect of both eyes    • Hyperlipidemia    • Hypertension    • Nasal drainage    • OCD (obsessive compulsive disorder)    • Pancreatitis    • Parathyroid disorder (HCC)    • PCOS (polycystic ovarian syndrome)    • S/P ERCP    • Transient global amnesia        PSHx:   Past Surgical History:   Procedure Laterality Date   • ABDOMINAL HYSTERECTOMY TOTAL      ovaries remain   • ADENOIDECTOMY     • CARDIAC CATH, RIGHT HEART      normal, EKG was Abnormal    • CHOLECYSTECTOMY     • DILATION AND CURETTAGE     • EYE SURGERY      b/l iridotomy   • HYSTERECTOMY LAPAROSCOPY     • LUMPECTOMY      benign   • TONSILLECTOMY     • US-NEEDLE CORE BX-BREAST PANEL         Family history   Family History   Problem Relation Age of Onset   • Hypertension Mother    • Heart Disease Father         CHF   • Cancer Father         melanoma   • Hypertension Sister    • Stroke Sister    • Sleep Apnea Sister    • Cancer Paternal Grandfather         colon   • Heart Disease Paternal Grandfather    • Stroke Paternal Grandfather    • Diabetes Paternal Grandfather    • Other Maternal Grandmother         eplepsey   • Heart Attack Maternal Grandfather    • Stroke Maternal Grandfather    • Stroke Paternal Grandmother    • Sleep Apnea  Sister          Medications:   Current Outpatient Prescriptions   Medication   • latanoprost (XALATAN) 0.005 % Solution   • sertraline (ZOLOFT) 25 MG tablet   • Cholecalciferol (VITAMIN D) 2000 UNIT Tab   • losartan (COZAAR) 25 MG Tab   • raNITidine (ZANTAC) 150 MG Tab   • aspirin EC (ECOTRIN) 81 MG Tablet Delayed Response   • albuterol (PROVENTIL) 2.5mg/3ml Nebu Soln solution for nebulization   • Misc. Devices Misc   • VOLTAREN 1 % Gel   • acetaminophen (TYLENOL) 500 MG Tab   • amLODIPine (NORVASC) 2.5 MG Tab   • albuterol (VENTOLIN HFA) 108 (90 Base) MCG/ACT Aero Soln inhalation aerosol   • cetirizine (ZYRTEC) 10 MG TABS     No current facility-administered medications for this visit.        Allergies:   Allergies   Allergen Reactions   • Ace Inhibitors      Cough   • Ceclor [Cefaclor] Hives   • Ceftin Hives   • Cephalosporins Rash   • Ciprofloxacin Rash   • Kenalog Hives   • Lamisil [Terbinafine Hcl]    • Latex Rash   • Merthiolate [Thimerosal] Hives   • Monistat [Tioconazole] Itching   • Pcn [Penicillins] Hives   • Benzalkonium Chloride Rash   • Tetracyclines Rash       Social Hx:   Social History     Social History   • Marital status:      Spouse name: N/A   • Number of children: N/A   • Years of education: N/A     Occupational History   • RN- retired      Social History Main Topics   • Smoking status: Never Smoker   • Smokeless tobacco: Never Used   • Alcohol use No   • Drug use: No   • Sexual activity: Yes     Partners: Male      Comment: , retired RN     Other Topics Concern   •  Service No   • Blood Transfusions No   • Caffeine Concern No   • Occupational Exposure No   • Hobby Hazards No   • Sleep Concern Yes   • Stress Concern No   • Weight Concern Yes   • Special Diet Yes   • Back Care No   • Exercise Yes   • Bike Helmet Yes   • Seat Belt Yes   • Self-Exams Yes     Social History Narrative    , 2 children- daughters.          EXAMINATION     Physical Exam:   Vitals: Blood  "pressure 124/86, pulse 66, temperature 36.3 °C (97.4 °F), temperature source Temporal, height 1.651 m (5' 5\"), weight 76.6 kg (168 lb 14 oz), SpO2 93 %, not currently breastfeeding.    Constitutional:   Body Habitus: Body mass index is 28.1 kg/m².  Cooperation: Fully cooperates with exam  Appearance: Well-groomed, well-nourished, not disheveled, no acute distress    Eyes: No scleral icterus, no proptosis     ENT -no obvious auditory deficits, no obvious tongue lesions, tongue midline, no facial droop     Skin -no rashes or lesions noted     Respiratory-  breathing comfortable on room air, no audible wheezing    Cardiovascular- capillary refills less than 2 seconds. No lower extremity edema is noted.     Gastrointestinal - No tenderness to palpation in the abdomen, no psoas tenderness bilaterally.  No tenderness over the pubic symphysis    Psychiatric- alert and oriented ×3. Normal affect.     Gait - normal gait, no use of ambulatory device, nonantalgic. The patient can toe walk with ease. The patient can heel walk with ease..     Musculoskeletal -   Cervical spine   Inspection: No deformities of the skin over the cervical spine. No rashes or lesions.    full  A/P ROM in all directions, without  pain      No signs of muscular atrophy in bilateral upper extremities     Thoracic/Lumbar Spine/Sacral Spine/Hips   Inspection: No evidence of atrophy in bilateral lower extremities throughout     ROM: functional  AROM with flexion, extension, lateral flexion, and rotation bilaterally    Palpation:   No tenderness to palpation in midline at T1-T12 levels. No tenderness to palpation in the left and right of the midline T1-L5  palpation over SI joint: positive right, negative left    palpation over buttock: negative bilaterally    palpation in hip or over the greater trochanters: negative bilaterally      Lumbar spine Special tests  Neuro tension  Straight leg test negative bilaterally      HIP  FAIR test negative bilaterally  "   Range of motion in the hips is within normal limits in flexion, extension, abduction, internal rotation, external rotation.    There is note of quadriceps tightness right greater than left    SI joint tests  Observation patient sits on one buttocks: Negative    LORETTA test positive right, negative left   Forward flexion test is positive on the right  Stork test reveals decreased movement in the right SI joint    Neuro       Key points for the international standards for neurological classification of spinal cord injury (ISNCSCI) to light touch.     Dermatome R L   C4 2 2   C5 2 2   C6 2 2   C7 2 2   C8 2 2   T1 2 2   T2 2 2   L2 2 2   L3 2 2   L4 2 2   L5 2 2   S1 2 2   S2 2 2       Motor Exam Upper Extremities   ? Myotome R L   Shoulder flexion C5 5 5   Elbow flexion C5 5 5   Wrist extension C6 5 5   Elbow extension C7 5 5   Finger flexion C8 5 5   Finger abduction T1 5 5       Motor Exam Lower Extremities    ? Myotome R L   Hip flexion L2 5 5   Knee extension L3 5 5   Ankle dorsiflexion L4 5 5   Toe extension L5 5 5   Ankle plantarflexion S1 5 5       Veronica’s sign negative bilaterally   Clonus of the ankle negative bilaterally     Reflexes  ?  R L   Biceps  2+ 2+   Brachioradialis  2+ 2+   Patella  2+ 2+   Achilles   2+ 2+       MEDICAL DECISION MAKING    Medical records review: see under HPI section.     DATA    Labs:   Lab Results   Component Value Date/Time    SODIUM 140 01/02/2019 08:10 AM    POTASSIUM 4.0 01/02/2019 08:10 AM    CHLORIDE 105 01/02/2019 08:10 AM    CO2 27 01/02/2019 08:10 AM    ANION 8.0 01/02/2019 08:10 AM    GLUCOSE 98 01/02/2019 08:10 AM    BUN 12 01/02/2019 08:10 AM    CREATININE 0.58 01/02/2019 08:10 AM    CALCIUM 10.1 01/02/2019 08:10 AM    ASTSGOT 25 01/02/2019 08:10 AM    ALTSGPT 21 01/02/2019 08:10 AM    TBILIRUBIN 1.0 01/02/2019 08:10 AM    ALBUMIN 4.3 01/02/2019 08:10 AM    TOTPROTEIN 7.2 01/02/2019 08:10 AM    GLOBULIN 2.9 01/02/2019 08:10 AM    AGRATIO 1.5 01/02/2019 08:10 AM        No results found for: PROTHROMBTM, INR     Lab Results   Component Value Date/Time    WBC 5.4 06/07/2018 10:22 AM    RBC 4.43 06/07/2018 10:22 AM    HEMOGLOBIN 14.0 06/07/2018 10:22 AM    HEMATOCRIT 41.7 06/07/2018 10:22 AM    MCV 94.1 06/07/2018 10:22 AM    MCH 31.6 06/07/2018 10:22 AM    MCHC 33.6 06/07/2018 10:22 AM    MPV 10.2 06/07/2018 10:22 AM    NEUTSPOLYS 50.20 06/07/2018 10:22 AM    LYMPHOCYTES 38.10 06/07/2018 10:22 AM    MONOCYTES 7.20 06/07/2018 10:22 AM    EOSINOPHILS 2.90 06/07/2018 10:22 AM    BASOPHILS 0.70 06/07/2018 10:22 AM        No results found for: HBA1C     Imaging: I personally reviewed following images, these are my reads - None today  IMAGING radiology reads. I reviewed the following radiology reads -None today                                                                                                                                                                            Diagnosis   Visit Diagnoses     ICD-10-CM   1. Right-sided low back pain without sciatica, unspecified chronicity M54.5   2. Sacroiliac joint dysfunction M53.3           ASSESSMENT:  Martha Ordonez 73 y.o. female presents which symptoms suggestive of right sacroiliac joint dysfunction     Martha was seen today for new patient.    Diagnoses and all orders for this visit:    Right-sided low back pain without sciatica, unspecified chronicity  -     DX-LUMBAR SPINE-2 OR 3 VIEWS; Future  -     REFERRAL TO PHYSICAL THERAPY Reason for Therapy: Eval/Treat/Report    Sacroiliac joint dysfunction  -     DX-PELVIS-1 OR 2 VIEWS; Future  -     REFERRAL TO PHYSICAL THERAPY Reason for Therapy: Eval/Treat/Report      1. Plan for xrays of the lumbar spine and pelvis.    2. Discussed the side effects that she has had after kenalog injections in the past.  We discussed that we will seek a non-invasive solution, if possible.  3. Trial of physical therapy.  4. Encouraged her to follow-up with plans for pelvic  ultrasound      Follow-up: 4-6 weeks        Please note that this dictation was created using voice recognition software. I have made every reasonable attempt to correct obvious errors but there may be errors of grammar and content that I may have overlooked prior to finalization of this note.      Stefan Lassiter MD  Physical Medicine and Rehabilitation  Interventional Spine and Sports Physiatry  [facility]           Ruchi Mendes M.D.

## 2019-02-12 NOTE — Clinical Note
Thank you very much for asking me to participate in Martha Ordonez's care.  Please contact me with any questions or concerns.  Stefan Lassiter MD

## 2019-02-13 ENCOUNTER — PATIENT MESSAGE (OUTPATIENT)
Dept: SLEEP MEDICINE | Facility: MEDICAL CENTER | Age: 74
End: 2019-02-13

## 2019-02-15 ENCOUNTER — APPOINTMENT (OUTPATIENT)
Dept: RADIOLOGY | Facility: MEDICAL CENTER | Age: 74
End: 2019-02-15
Attending: INTERNAL MEDICINE
Payer: MEDICARE

## 2019-02-15 NOTE — PATIENT COMMUNICATION
I was able to schedule the pt on 4/2/19 @ 3:00pm with Dr. Mason to regarding the pt dental device and wanting to start on cpap therapy.

## 2019-02-20 ENCOUNTER — HOSPITAL ENCOUNTER (OUTPATIENT)
Dept: RADIOLOGY | Facility: MEDICAL CENTER | Age: 74
End: 2019-02-20
Attending: PHYSICAL MEDICINE & REHABILITATION
Payer: MEDICARE

## 2019-02-20 DIAGNOSIS — M53.3 SACROILIAC JOINT DYSFUNCTION: ICD-10-CM

## 2019-02-20 DIAGNOSIS — M54.50 RIGHT-SIDED LOW BACK PAIN WITHOUT SCIATICA, UNSPECIFIED CHRONICITY: ICD-10-CM

## 2019-02-20 PROCEDURE — 72170 X-RAY EXAM OF PELVIS: CPT

## 2019-02-20 PROCEDURE — 72100 X-RAY EXAM L-S SPINE 2/3 VWS: CPT

## 2019-02-26 ENCOUNTER — PATIENT MESSAGE (OUTPATIENT)
Dept: MEDICAL GROUP | Age: 74
End: 2019-02-26

## 2019-02-26 DIAGNOSIS — B37.31 VAGINAL YEAST INFECTION: ICD-10-CM

## 2019-02-26 RX ORDER — FLUCONAZOLE 150 MG/1
150 TABLET ORAL DAILY
Qty: 2 TAB | Refills: 0 | Status: SHIPPED | OUTPATIENT
Start: 2019-02-26 | End: 2019-04-19 | Stop reason: SDUPTHER

## 2019-02-27 NOTE — TELEPHONE ENCOUNTER
From: Martha Ordonez  To: Ruchi Plasencia M.D.  Sent: 2/26/2019 11:36 AM PST  Subject: Prescription Question    Dr. Plasencia  I caught a cold from my grandkids a few weeks ago and I ended up with a sinus infection. I was seen at [facility] and was given Clarithromycin. I've not got a vaginal yeast infection which is not relieved by vaginal cream.   Would you send in a Rx for the oral medication for me to [facility] pharmacy. I usually have to take two rounds to get rid of it.  It would be greatly appreciated.   By the way, I'm still do vegetarian diet and am down to 163 pounds! Yea!  Thank you.  Martha Ordonez  [phone]

## 2019-02-27 NOTE — PATIENT COMMUNICATION
Prescribed Diflucan for vaginal yeast infection and advised patient to take it 1 dose for symptoms.  She may take second dose if symptoms do not improve in 72 hours.    Ruchi Plasencia M.D.

## 2019-03-01 ENCOUNTER — HOSPITAL ENCOUNTER (OUTPATIENT)
Dept: RADIOLOGY | Facility: MEDICAL CENTER | Age: 74
End: 2019-03-01
Attending: INTERNAL MEDICINE
Payer: MEDICARE

## 2019-03-01 DIAGNOSIS — R10.2 PELVIC PAIN: ICD-10-CM

## 2019-03-01 PROCEDURE — 76830 TRANSVAGINAL US NON-OB: CPT

## 2019-03-04 DIAGNOSIS — I10 ESSENTIAL HYPERTENSION: ICD-10-CM

## 2019-03-04 DIAGNOSIS — K21.9 GASTROESOPHAGEAL REFLUX DISEASE WITHOUT ESOPHAGITIS: ICD-10-CM

## 2019-03-04 PROBLEM — R33.9 INCOMPLETE EMPTYING OF BLADDER: Status: ACTIVE | Noted: 2019-03-04

## 2019-03-04 RX ORDER — LOSARTAN POTASSIUM 25 MG/1
25 TABLET ORAL DAILY
Qty: 90 TAB | Refills: 3 | Status: SHIPPED | OUTPATIENT
Start: 2019-03-04 | End: 2019-03-28 | Stop reason: CLARIF

## 2019-03-04 RX ORDER — RANITIDINE 150 MG/1
150 TABLET ORAL 2 TIMES DAILY
Qty: 180 TAB | Refills: 3 | Status: SHIPPED | OUTPATIENT
Start: 2019-03-04 | End: 2019-12-04

## 2019-03-07 ENCOUNTER — PHYSICAL THERAPY (OUTPATIENT)
Dept: PHYSICAL THERAPY | Facility: MEDICAL CENTER | Age: 74
End: 2019-03-07
Attending: PHYSICAL MEDICINE & REHABILITATION
Payer: MEDICARE

## 2019-03-07 DIAGNOSIS — M53.3 SACROILIAC JOINT DYSFUNCTION: ICD-10-CM

## 2019-03-07 DIAGNOSIS — M54.50 RIGHT-SIDED LOW BACK PAIN WITHOUT SCIATICA, UNSPECIFIED CHRONICITY: ICD-10-CM

## 2019-03-07 PROCEDURE — 97161 PT EVAL LOW COMPLEX 20 MIN: CPT

## 2019-03-07 PROCEDURE — 97110 THERAPEUTIC EXERCISES: CPT

## 2019-03-07 ASSESSMENT — ENCOUNTER SYMPTOMS
PAIN SCALE AT HIGHEST: 6
PAIN SCALE AT LOWEST: 0
QUALITY: SHARP
QUALITY: DULL ACHE
QUALITY: ACHING
PAIN SCALE: 2

## 2019-03-07 NOTE — OP THERAPY EVALUATION
"  Outpatient Physical Therapy  INITIAL EVALUATION    [facility] Outpatient Physical Therapy  [address]  [place] [address]  Phone:  [phone]  Fax:  [phone]    Date of Evaluation: 03/07/2019    Patient: Martha Ordonez  YOB: 1945  MRN: [id number]     Referring Provider: Stefan Lassiter M.D.  [address]  [address]  [place] [address]   Referring Diagnosis Right-sided low back pain without sciatica, unspecified chronicity [M54.5];Sacroiliac joint dysfunction [M53.3]     Time Calculation  Start time: 0945  Stop time: 1030 Time Calculation (min): 45 minutes     Physical Therapy Occurrence Codes    Date of onset of impairment:  2/12/19   Date physical therapy care plan established or reviewed:  3/7/19   Date physical therapy treatment started:  3/7/19          Chief Complaint: No chief complaint on file.    Visit Diagnoses     ICD-10-CM   1. Right-sided low back pain without sciatica, unspecified chronicity M54.5   2. Sacroiliac joint dysfunction M53.3         Subjective:   History of Present Illness:     Mechanism of injury:  Pt \"Martha\" states she fell out of a car when she was 6 and thinks it is the cause of her mild scoliosis. Back in October, she carried her grand daughter to breakfast and after that she started having trouble. Her pain is in her low back and spreads into her R glut, right in the back, sometimes to the side of her hip. The pain will come and go. Walking helps, walking up a hill helps. She gets a massage weekly which helps. Advil, heat, hot tub helps. Lifting, sitting (needs lumbar support), crossing her legs (R over L) aggravages it. Pt states pain does not go below her hip, and no n/t into her both legs. No weakness in her legs and no changes in b/b. Pt has history of anxiety, high BP, glaucoma.      Latex allergy listed.  Sleep disturbance: Sleeping pretty good, being treated for sleep apnea and is sleeping better now.  Pain:     " Current pain ratin    At best pain ratin    At worst pain ratin    Quality:  Aching, dull ache and sharp (Sharp if she moves wrong)  Patient Goals:     Patient goals for therapy:  Decreased pain and increased strength    Other patient goals:  How to self care when it flares up      Past Medical History:   Diagnosis Date   • Abnormal thyroid function test 9/10/2014   • Allergic rhinitis    • Anxiety    • Asthma    • Back pain    • Basal cell carcinoma     squamos cell right ear   • Bronchitis    • Cardiac arrhythmia    • Depression    • Elevated liver enzymes 9/10/2014   • Family history of melanoma    • GERD (gastroesophageal reflux disease)    • Glaucoma suspect of both eyes    • Hyperlipidemia    • Hypertension    • Nasal drainage    • OCD (obsessive compulsive disorder)    • Pancreatitis    • Parathyroid disorder (HCC)    • PCOS (polycystic ovarian syndrome)    • S/P ERCP    • Transient global amnesia      Past Surgical History:   Procedure Laterality Date   • ABDOMINAL HYSTERECTOMY TOTAL      ovaries remain   • ADENOIDECTOMY     • CARDIAC CATH, RIGHT HEART      normal, EKG was Abnormal    • CHOLECYSTECTOMY     • DILATION AND CURETTAGE     • EYE SURGERY      b/l iridotomy   • HYSTERECTOMY LAPAROSCOPY     • LUMPECTOMY      benign   • TONSILLECTOMY     • US-NEEDLE CORE BX-BREAST PANEL       Social History   Substance Use Topics   • Smoking status: Never Smoker   • Smokeless tobacco: Never Used   • Alcohol use No     Family and Occupational History     Social History   • Marital status:      Spouse name: N/A   • Number of children: N/A   • Years of education: N/A     Occupational History   • RN- retired        Objective     Hip Screen   Hip range of motion within functional limits with the following exceptions: WNL for all ranges, pain at EOR for IR/ER on the R  Hip strength within functional limits with the following exceptions: 4/5 for B hip flexion/IR/ER    Neurological Testing     Reflexes    Left   Patellar (L4): normal (2+)  Achilles (S1): normal (2+)    Right   Patellar (L4): normal (2+)  Achilles (S1): normal (2+)    Myotome testing   Lumbar (left)   L1 (hip flexors): 4  L2 (hip flexors): 4  L3 (knee extensors): 5  L4 (ankle dorsiflexors): 5  S1 (ankle plantar flexors): 5    Lumbar (right)   L1 (hip flexors): 4  L2 (hip flexors): 4  L3 (knee extensors): 5  L4 (ankle dorsiflexors): 5  S1 (ankle plantar flexors): 5    Palpation   Left   No palpable tenderness to the gluteus christiano, iliopsoas, iliotibial, lumbar paraspinals, proximal biceps femoris, proximal semimembranosus, proximal semitendinosus and quadratus lumborum.   Tenderness of the gluteus medius and piriformis.     Right   No palpable tenderness to the gluteus christiano, iliopsoas, iliotibial, lumbar paraspinals, proximal biceps femoris, proximal semimembranosus, proximal semitendinosus and quadratus lumborum.   Tenderness of the gluteus medius and piriformis.     Tenderness     Left Hip   No tenderness in the sacroiliac joint.     Right Hip   Tenderness in the sacroiliac joint.     Active Range of Motion     Lumbar   Flexion: within functional limits  Extension: within functional limits  Left lateral flexion: within functional limits  Right lateral flexion: within functional limits  Left rotation: within functional limits  Right rotation: within functional limits    Additional Active Range of Motion Details  Pain on the R with B SB, sharp with L SB, dull with R SB. Pain on the R with R rotation    Joint Play   Spine     Unilateral PA Glide (left)        L1: WFL       L2: WFL       L3: WFL       L4: WFL       L5: WFL       S1: WFL        Strength:      Left Hip   Planes of Motion   Flexion: 4    Right Hip   Planes of Motion   Flexion: 4    Left Knee   Flexion: 5  Extension: 5    Right Knee   Flexion: 5  Extension: 5    Left Ankle/Foot   Dorsiflexion: 5  Plantar flexion: 5    Right Ankle/Foot   Dorsiflexion: 5  Plantar flexion:  "5    Additional Strength Details  4/5 for B hip IR/ER    Tests     Left Hip   Negative SI compression and SI distraction.   SLR: Negative.     Right Hip   Negative SI compression and SI distraction.   SLR: Negative.     Additional Tests Details  LLE: (-) LORETTA, FADIR, scour  RLE: (+) LORETTA, FADIR, scour        Therapeutic Exercises (CPT 45566):     1. LTR, 10 x 1    2. SKTC, 30\" x 1    3. Piriformis str (reg/mod), 30\" x 1 each    4. Hamstring supine, 30\" x 1    5. ADIM, 5\" x 10 x 1    6. Bridge, 5\" x 10 x 1      Time-based treatments/modalities:  Therapeutic exercise minutes (CPT 10771): 10 minutes       Assessment, Response and Plan:   Impairments: abnormal muscle firing, abnormal or restricted ROM, activity intolerance and pain with function    Assessment details:  Pt is a pleasant, cooperative 74 yo female who presents with R low back and hip pain. Pt's pain has s/s consistent with referral pain from the lumbar spine given pain with lx ROM and movement, but also appears to have hip joint component as well given her (+) LORETTA, FADIR, and scour on the R. Pt will benefit from skilled physical therapy in order to strengthen her core and hips, improve her painfree R hip ROM, decrease her overall pain and educate her in self-treatment of pain for any future flare ups in order to return to ADLs and recreational activities with less pain and restriction.  Barriers to therapy:  None  Prognosis: good    Goals:   Short Term Goals:   1. Pt is to be able to perform Lx SB bilaterally without pain  2. Pt is able to sit for 30 min without increase in pain  3. Pt is to perform HEP without cueing demonstrating compliance.  Short term goal time span:  2-4 weeks      Long Term Goals:    1. Pt is to have 0/10 VAS pain at rest  2. Pt is to have full painfree hip ROM into IR/ER  3. Pt is to feel comfortable with self progression/regression of exercises in her HEP  Long term goal time span:  6-8 weeks    Plan:   Therapy options:  " Physical therapy treatment to continue  Planned therapy interventions:  E Stim Unattended (CPT 24055), Manual Therapy (CPT 01991), Mechanical Traction (CPT 30553), Neuromuscular Re-education (CPT 37929), Therapeutic Exercise (CPT 78812) and Therapeutic Activities (CPT 64653)  Frequency:  2x week  Duration in weeks:  8  Discussed with:  Patient  Plan details:  Pt to start with PT for 2x/week and will then decrease to 1x/week once HEP is established and pt is making improvements between visits.        Functional Limitations and Severity Modifiers  Guy Neal Low Back Pain and Disability Score: 25   Current:  na   Goal:  na     Referring provider co-signature:  I have reviewed this plan of care and my co-signature certifies the need for services.  Certification Dates:   From 03/07/19     To 05/02/19    Physician Signature: ________________________________ Date: ______________

## 2019-03-14 ENCOUNTER — PHYSICAL THERAPY (OUTPATIENT)
Dept: PHYSICAL THERAPY | Facility: MEDICAL CENTER | Age: 74
End: 2019-03-14
Attending: PHYSICAL MEDICINE & REHABILITATION
Payer: MEDICARE

## 2019-03-14 DIAGNOSIS — M54.50 RIGHT-SIDED LOW BACK PAIN WITHOUT SCIATICA, UNSPECIFIED CHRONICITY: ICD-10-CM

## 2019-03-14 DIAGNOSIS — M53.3 SACROILIAC JOINT DYSFUNCTION: ICD-10-CM

## 2019-03-14 PROCEDURE — 97110 THERAPEUTIC EXERCISES: CPT

## 2019-03-14 NOTE — OP THERAPY DAILY TREATMENT
"  Outpatient Physical Therapy  DAILY TREATMENT     [facility] Outpatient Physical Therapy  [address]  [place] [address]  Phone:  [phone]  Fax:  [phone]    Date: 03/14/2019    Patient: Martha Ordonez  YOB: 1945  MRN: [id number]     Time Calculation  Start time: 1130  Stop time: 1200 Time Calculation (min): 30 minutes     Chief Complaint: Hip Problem and Back Problem    Visit #: 2    SUBJECTIVE:  Pt states she had the flu but has been doing the exercises every day but 2 because she was sick. She does feel they are helping, she has had no pain until this morning, but thinks it was from lying down so much when she was sick.     OBJECTIVE:  Current objective measures:           Therapeutic Exercises (CPT 01255):     1. Nu-step, S7, A11, L2, 4 min    2. SKTC, 20\" x 2    3. Bridge, 5\" x 10 x 1    4. Hamstring roll ball, 10 x 1    5. Bridge on ball, 10 x 1    6. Clam, 10 x 1    7. Supine hip addution, 5\" x 10 x 1    8. Supine hip abduction (B, R, L), pink, 10 x 1 each    9. Sit<>stand, 10 x 1, VCs for neutral spine      Therapeutic Exercise Summary: HEP: LTR, STKC, hamstring supine, piriformis (reg/mod), ADIM, bridge      Time-based treatments/modalities:  Therapeutic exercise minutes (CPT 47719): 25 minutes       ASSESSMENT:   Response to treatment: Pt presents with R LBP and R hip pain. Pt has had decreased R hip and back pain since the eval. Pt required some verbal cues for positioning with clams and sit<>stands, but able to correct. Pt likely to do well with continued progression of strengthening.     PLAN/RECOMMENDATIONS:   Plan for treatment: therapy treatment to continue next visit.  Planned interventions for next visit: continue with current treatment. Progress to standing strengthening (rows, multifidus WO, monster walks, etc).       "

## 2019-03-19 ENCOUNTER — PHYSICAL THERAPY (OUTPATIENT)
Dept: PHYSICAL THERAPY | Facility: MEDICAL CENTER | Age: 74
End: 2019-03-19
Attending: PHYSICAL MEDICINE & REHABILITATION
Payer: MEDICARE

## 2019-03-19 DIAGNOSIS — M53.3 SACROILIAC JOINT DYSFUNCTION: ICD-10-CM

## 2019-03-19 DIAGNOSIS — M54.50 RIGHT-SIDED LOW BACK PAIN WITHOUT SCIATICA, UNSPECIFIED CHRONICITY: ICD-10-CM

## 2019-03-19 PROCEDURE — 97110 THERAPEUTIC EXERCISES: CPT

## 2019-03-19 NOTE — OP THERAPY DAILY TREATMENT
"  Outpatient Physical Therapy  DAILY TREATMENT     [facility] Outpatient Physical Therapy  [address]  [place] [address]  Phone:  [phone]  Fax:  [phone]    Date: 03/19/2019    Patient: Martha Ordonez  YOB: 1945  MRN: [id number]     Time Calculation  Start time: 1115  Stop time: 1150 Time Calculation (min): 35 minutes     Chief Complaint: Back Problem    Visit #: 3    SUBJECTIVE:  Exercises really helped.  Sleep is good.  Sitting is fine, can cross legs/    OBJECTIVE:  Current objective measures: SB L/S painfree and full ROM  R Hip ROM full with IR and ER   Strength 4+ to 5/5 suellen LEs  Guy Neal Low Back Pain and Disability Score: 0       Therapeutic Exercises (CPT 48105):     1. Nu-step, S7, A11, L2, 4 min    2. SKTC, 20\" x 2    3. Bridge, 5\" x 10 x 1    4. Hamstring roll ball, 10 x 1    5. Bridge on ball, 10 x 1    6. Clam, 10 x 1    7. Supine hip addution, 5\" x 10 x 1    8. Supine hip abduction (B, R, L), pink, 10 x 1 each    9. Sit<>stand, 10 x 1, VCs for neutral spine    10. TA with march, with march with alt U    11. Quad TA, 5    12. Quad alt LE, 10      Time-based treatments/modalities:  Plan to return to PT in 2 weeks after doing exercises and d/c if continues to met goals.    PLAN/RECOMMENDATIONS:   Plan for treatment: therapy treatment to continue next visit.  Planned interventions for next visit: continue with current treatment.      "

## 2019-03-21 ENCOUNTER — APPOINTMENT (OUTPATIENT)
Dept: PHYSICAL THERAPY | Facility: MEDICAL CENTER | Age: 74
End: 2019-03-21
Attending: PHYSICAL MEDICINE & REHABILITATION
Payer: MEDICARE

## 2019-03-22 ENCOUNTER — OFFICE VISIT (OUTPATIENT)
Dept: PHYSICAL MEDICINE AND REHAB | Facility: MEDICAL CENTER | Age: 74
End: 2019-03-22
Payer: MEDICARE

## 2019-03-22 VITALS
WEIGHT: 165.57 LBS | TEMPERATURE: 97.5 F | DIASTOLIC BLOOD PRESSURE: 68 MMHG | HEART RATE: 87 BPM | BODY MASS INDEX: 27.58 KG/M2 | OXYGEN SATURATION: 96 % | SYSTOLIC BLOOD PRESSURE: 128 MMHG | HEIGHT: 65 IN

## 2019-03-22 DIAGNOSIS — M41.80 DEXTROSCOLIOSIS: ICD-10-CM

## 2019-03-22 DIAGNOSIS — M51.36 DDD (DEGENERATIVE DISC DISEASE), LUMBAR: ICD-10-CM

## 2019-03-22 PROCEDURE — 99213 OFFICE O/P EST LOW 20 MIN: CPT | Performed by: PHYSICAL MEDICINE & REHABILITATION

## 2019-03-22 ASSESSMENT — PAIN SCALES - GENERAL: PAINLEVEL: NO PAIN

## 2019-03-22 ASSESSMENT — PATIENT HEALTH QUESTIONNAIRE - PHQ9: CLINICAL INTERPRETATION OF PHQ2 SCORE: 0

## 2019-03-22 NOTE — PROGRESS NOTES
Follow-up patient note    Physiatry (physical medicine and  Rehabilitation), interventional spine and sports medicine    Date of Service: 3/22/2019    Chief complaint: Low back pain    HISTORY    HPI: Martha Ordonez 73 y.o. female who presents today for evaluation of right gluteal region.      Since her last visit, she has been to PT.  She reports that her pain is gone.  The physical therapy has been very helpful for her.      At this point, she is doing her exercises about twice a day.  This takes about 25 minutes each time.    She continues to get a massage once a week, which is her usual routine.    She is not taking tylenol or other medications for pain.    Medical records review:  Previous treatments:    Physical Therapy: Yes    Medications the patient is tried: tylenol    Previous interventions: none     Previous surgeries to relieve the above pain:  none      ROS: Unchanged from previous visit 2/12/2019  Eyes:vision problems, glaucoma  CV:irregular heart rate  Resp:asthma  GI:GERD, pancreatitis  Psych:anxiety-induced depression  Endo:hyperparathryoidism?  Being followed by Endo    Red Flags ROS:   Fever, Chills, Sweats: Denies  Involuntary Weight Loss: Denies  Bladder Incontinence: Denies  Bowel Incontinence: denies  Saddle Anesthesia: Denies    All other systems reviewed and negative.       PMHx:   Past Medical History:   Diagnosis Date   • Abnormal thyroid function test 9/10/2014   • Allergic rhinitis    • Anxiety    • Asthma    • Back pain    • Basal cell carcinoma     squamos cell right ear   • Bronchitis    • Cardiac arrhythmia    • Depression    • Elevated liver enzymes 9/10/2014   • Family history of melanoma    • GERD (gastroesophageal reflux disease)    • Glaucoma suspect of both eyes    • Hyperlipidemia    • Hypertension    • Nasal drainage    • OCD (obsessive compulsive disorder)    • Pancreatitis    • Parathyroid disorder (HCC)    • PCOS (polycystic ovarian syndrome)    • S/P ERCP    • Transient  global amnesia        PSHx:   Past Surgical History:   Procedure Laterality Date   • ABDOMINAL HYSTERECTOMY TOTAL      ovaries remain   • ADENOIDECTOMY     • CARDIAC CATH, RIGHT HEART      normal, EKG was Abnormal    • CHOLECYSTECTOMY     • DILATION AND CURETTAGE     • EYE SURGERY      b/l iridotomy   • HYSTERECTOMY LAPAROSCOPY     • LUMPECTOMY      benign   • TONSILLECTOMY     • US-NEEDLE CORE BX-BREAST PANEL         Family history   Family History   Problem Relation Age of Onset   • Hypertension Mother    • Heart Disease Father         CHF   • Cancer Father         melanoma   • Hypertension Sister    • Stroke Sister    • Sleep Apnea Sister    • Cancer Paternal Grandfather         colon   • Heart Disease Paternal Grandfather    • Stroke Paternal Grandfather    • Diabetes Paternal Grandfather    • Other Maternal Grandmother         eplepsey   • Heart Attack Maternal Grandfather    • Stroke Maternal Grandfather    • Stroke Paternal Grandmother    • Sleep Apnea Sister          Medications:   Current Outpatient Prescriptions   Medication   • raNITidine (ZANTAC) 150 MG Tab   • losartan (COZAAR) 25 MG Tab   • acetaminophen (TYLENOL) 500 MG Tab   • sertraline (ZOLOFT) 25 MG tablet   • Cholecalciferol (VITAMIN D) 2000 UNIT Tab   • aspirin EC (ECOTRIN) 81 MG Tablet Delayed Response   • albuterol (PROVENTIL) 2.5mg/3ml Nebu Soln solution for nebulization   • Misc. Devices Misc   • latanoprost (XALATAN) 0.005 % Solution   • VOLTAREN 1 % Gel   • amLODIPine (NORVASC) 2.5 MG Tab   • albuterol (VENTOLIN HFA) 108 (90 Base) MCG/ACT Aero Soln inhalation aerosol   • cetirizine (ZYRTEC) 10 MG TABS     No current facility-administered medications for this visit.        Allergies:   Allergies   Allergen Reactions   • Ace Inhibitors      Cough   • Ceclor [Cefaclor] Hives   • Ceftin Hives   • Cephalosporins Rash   • Ciprofloxacin Rash   • Kenalog Hives   • Lamisil [Terbinafine Hcl]    • Latex Rash   • Merthiolate [Thimerosal] Hives   •  "Monistat [Tioconazole] Itching   • Pcn [Penicillins] Hives   • Benzalkonium Chloride Rash   • Tetracyclines Rash       Social Hx:   Social History     Social History   • Marital status:      Spouse name: N/A   • Number of children: N/A   • Years of education: N/A     Occupational History   • RN- retired      Social History Main Topics   • Smoking status: Never Smoker   • Smokeless tobacco: Never Used   • Alcohol use No   • Drug use: No   • Sexual activity: Yes     Partners: Male      Comment: , retired RN     Other Topics Concern   •  Service No   • Blood Transfusions No   • Caffeine Concern No   • Occupational Exposure No   • Hobby Hazards No   • Sleep Concern Yes   • Stress Concern No   • Weight Concern Yes   • Special Diet Yes   • Back Care No   • Exercise Yes   • Bike Helmet Yes   • Seat Belt Yes   • Self-Exams Yes     Social History Narrative    , 2 children- daughters.          EXAMINATION     Physical Exam:   Vitals: Blood pressure 128/68, pulse 87, temperature 36.4 °C (97.5 °F), temperature source Temporal, height 1.651 m (5' 5\"), weight 75.1 kg (165 lb 9.1 oz), SpO2 96 %, not currently breastfeeding.    Constitutional:   Body Habitus: Body mass index is 27.55 kg/m².  Cooperation: Fully cooperates with exam  Appearance: Well-groomed, well-nourished, not disheveled, no acute distress    Eyes: No scleral icterus, no proptosis     ENT -no obvious auditory deficits, no facial droop     Skin -no rashes or lesions noted     Respiratory-  breathing comfortable on room air, no audible wheezing    Cardiovascular- No lower extremity edema is noted.     Gastrointestinal - No psoas tenderness bilaterally.     Psychiatric- alert and oriented ×3. Normal affect.     Gait - normal gait, no use of ambulatory device, nonantalgic.      Musculoskeletal -     Thoracic/Lumbar Spine/Sacral Spine/Hips   Inspection: No evidence of atrophy in bilateral lower extremities throughout     Palpation: "   palpation over SI joint: negative bilaterally    palpation over buttock: negative bilaterally       Lumbar spine Special tests  Neuro tension  Straight leg test negative bilaterally      Mild quadriceps tightness bilaterally    SI joint tests  LORETTA test negative bilaterally    Neuro       Key points for the international standards for neurological classification of spinal cord injury (ISNCSCI) to light touch.     Dermatome R L   L2 2 2   L3 2 2   L4 2 2   L5 2 2   S1 2 2   S2 2 2       Motor Exam Lower Extremities    ? Myotome R L   Hip flexion L2 5 5   Knee extension L3 5 5   Ankle dorsiflexion L4 5 5   Toe extension L5 5 5   Ankle plantarflexion S1 5 5       Reflexes  ?  R L   Patella  2+ 2+   Achilles   2+ 2+       MEDICAL DECISION MAKING    Medical records review: see under HPI section.     DATA    Labs:   Lab Results   Component Value Date/Time    SODIUM 140 01/02/2019 08:10 AM    POTASSIUM 4.0 01/02/2019 08:10 AM    CHLORIDE 105 01/02/2019 08:10 AM    CO2 27 01/02/2019 08:10 AM    ANION 8.0 01/02/2019 08:10 AM    GLUCOSE 98 01/02/2019 08:10 AM    BUN 12 01/02/2019 08:10 AM    CREATININE 0.58 01/02/2019 08:10 AM    CALCIUM 10.1 01/02/2019 08:10 AM    ASTSGOT 25 01/02/2019 08:10 AM    ALTSGPT 21 01/02/2019 08:10 AM    TBILIRUBIN 1.0 01/02/2019 08:10 AM    ALBUMIN 4.3 01/02/2019 08:10 AM    TOTPROTEIN 7.2 01/02/2019 08:10 AM    GLOBULIN 2.9 01/02/2019 08:10 AM    AGRATIO 1.5 01/02/2019 08:10 AM       No results found for: PROTHROMBTM, INR     Lab Results   Component Value Date/Time    WBC 5.4 06/07/2018 10:22 AM    RBC 4.43 06/07/2018 10:22 AM    HEMOGLOBIN 14.0 06/07/2018 10:22 AM    HEMATOCRIT 41.7 06/07/2018 10:22 AM    MCV 94.1 06/07/2018 10:22 AM    MCH 31.6 06/07/2018 10:22 AM    MCHC 33.6 06/07/2018 10:22 AM    MPV 10.2 06/07/2018 10:22 AM    NEUTSPOLYS 50.20 06/07/2018 10:22 AM    LYMPHOCYTES 38.10 06/07/2018 10:22 AM    MONOCYTES 7.20 06/07/2018 10:22 AM    EOSINOPHILS 2.90 06/07/2018 10:22 AM     BASOPHILS 0.70 06/07/2018 10:22 AM        No results found for: HBA1C     Imaging: I personally reviewed following images, these are my reads -   Xray lumbar spine 02/20/2019  Mild dextroscoliosis centered at L2 which measure 13 degrees.  Mild decreased disc height L5-S1   Mild facet arthropathy and note of end-plate spurring at left L2-3 and L3-4 anteriorly.    Xray pelvis 02/20/2019  Normal sacroiliac joints  Mild acetabular spurring without significant hip joint narrowing bilaterally     IMAGING radiology reads. I reviewed the following radiology reads   Xray lumbar spine 02/20/2019  Mild upper lumbar dextroscoliosis with mild degenerative disc disease    Xray pelvis 02/20/2019  Mild bilateral acetabular spurring without joint space narrowing                                                                                                                                                                            Diagnosis   Visit Diagnoses     ICD-10-CM   1. DDD (degenerative disc disease), lumbar M51.36   2. Dextroscoliosis M41.80           ASSESSMENT:  Martha Gregoryipes 73 y.o. female presents which symptoms suggestive of right sacroiliac joint dysfunction     Martha was seen today for follow-up.    Diagnoses and all orders for this visit:    DDD (degenerative disc disease), lumbar    Dextroscoliosis  Comments:  Upper lumbar spine         1. Discussed xrays of the lumbar spine and pelvis.    2. Discussed the side effects that she has had after kenalog injections in the past.  We discussed that we will seek a non-invasive solution.  3. Trial of physical therapy has been successful.  She will continue with her home program.  4. Discussed follow-up if symptoms should return or worsen.    Follow-up: 3 months, prn if symptoms worsen        Please note that this dictation was created using voice recognition software. I have made every reasonable attempt to correct obvious errors but there may be errors of grammar and  content that I may have overlooked prior to finalization of this note.      Stefan Lassiter MD  Physical Medicine and Rehabilitation  Interventional Spine and Sports Physiatry  [facility]

## 2019-03-27 ENCOUNTER — PATIENT MESSAGE (OUTPATIENT)
Dept: MEDICAL GROUP | Age: 74
End: 2019-03-27

## 2019-03-27 DIAGNOSIS — I10 ESSENTIAL HYPERTENSION: ICD-10-CM

## 2019-03-28 RX ORDER — LOSARTAN POTASSIUM 25 MG/1
25 TABLET ORAL DAILY
Qty: 90 TAB | Refills: 3 | Status: SHIPPED | OUTPATIENT
Start: 2019-03-28 | End: 2019-05-22 | Stop reason: CLARIF

## 2019-03-28 NOTE — PATIENT COMMUNICATION
I discussed with patient over the phone regarding losartan recall.  Patient reported that she wanted to take the brand form losartan, Cozaar as it is very helpful to control her blood pressure without side effects.  She cannot tolerate many blood pressure medication.  She could not tolerate lisinopril or ACE inhibitor due to cough, she could not tolerate hydrochlorothiazide made her dehydration and hypercalcemia.  She also does not like amlodipine that caused her anxious, awake and insomnia.  She preferred to continue losartan, but generic lovastatin is in recall.  She requests to prescribe losartan brand form (Cozaar) to [facility] pharmacy for her and for her .    She wanted to have prior authorization for brand form.  She understands that her health insurance may not cover brand form.    Ruchi Plasencia M.D.

## 2019-04-02 ENCOUNTER — TELEPHONE (OUTPATIENT)
Dept: PHYSICAL THERAPY | Facility: MEDICAL CENTER | Age: 74
End: 2019-04-02

## 2019-04-02 NOTE — OP THERAPY DISCHARGE SUMMARY
Outpatient Physical Therapy  DISCHARGE SUMMARY NOTE      [facility] Outpatient Physical Therapy  [address]  [place] [address]  Phone:  [phone]  Fax:  [phone]    Date of Visit: 04/02/2019    Patient: Martha Ordonez  YOB: 1945  MRN: [id number]     Referring Provider: No referring provider defined for this encounter.   Referring Diagnosis No admission diagnoses are documented for this encounter.     Physical Therapy Occurrence Codes    Date of onset of impairment:  2/12/19   Date physical therapy care plan established or reviewed:  3/7/19   Date physical therapy treatment started:  3/7/19          Functional Limitations and Severity Modifiers      Goal:     Discharge:         Your patient is being discharged from Physical Therapy with the following comments:   · Goals met    Comments:  Pt called to cancel last appointment, feels great.     Limitations Remaining:  Unable to formally measure, pt states she feels great.    Recommendations:  Pt to d/c to [facility] Ren, PT, DPT    Date: 4/2/2019

## 2019-04-19 DIAGNOSIS — B37.31 VAGINAL YEAST INFECTION: ICD-10-CM

## 2019-04-19 RX ORDER — FLUCONAZOLE 150 MG/1
TABLET ORAL
Qty: 2 TAB | Refills: 0 | Status: SHIPPED | OUTPATIENT
Start: 2019-04-19 | End: 2019-05-22

## 2019-05-15 ENCOUNTER — HOSPITAL ENCOUNTER (OUTPATIENT)
Dept: LAB | Facility: MEDICAL CENTER | Age: 74
End: 2019-05-15
Attending: INTERNAL MEDICINE
Payer: MEDICARE

## 2019-05-15 DIAGNOSIS — I10 ESSENTIAL HYPERTENSION: ICD-10-CM

## 2019-05-15 DIAGNOSIS — E78.00 PURE HYPERCHOLESTEROLEMIA: ICD-10-CM

## 2019-05-15 DIAGNOSIS — R79.89 INCREASED PTH LEVEL: ICD-10-CM

## 2019-05-15 DIAGNOSIS — E55.9 VITAMIN D INSUFFICIENCY: ICD-10-CM

## 2019-05-15 LAB
25(OH)D3 SERPL-MCNC: 70 NG/ML (ref 30–100)
ALBUMIN SERPL BCP-MCNC: 4.1 G/DL (ref 3.2–4.9)
ALBUMIN/GLOB SERPL: 1.4 G/DL
ALP SERPL-CCNC: 112 U/L (ref 30–99)
ALT SERPL-CCNC: 21 U/L (ref 2–50)
ANION GAP SERPL CALC-SCNC: 7 MMOL/L (ref 0–11.9)
AST SERPL-CCNC: 25 U/L (ref 12–45)
BILIRUB SERPL-MCNC: 0.8 MG/DL (ref 0.1–1.5)
BUN SERPL-MCNC: 13 MG/DL (ref 8–22)
CALCIUM SERPL-MCNC: 9.5 MG/DL (ref 8.5–10.5)
CHLORIDE SERPL-SCNC: 105 MMOL/L (ref 96–112)
CHOLEST SERPL-MCNC: 195 MG/DL (ref 100–199)
CO2 SERPL-SCNC: 28 MMOL/L (ref 20–33)
CREAT SERPL-MCNC: 0.8 MG/DL (ref 0.5–1.4)
FASTING STATUS PATIENT QL REPORTED: NORMAL
GLOBULIN SER CALC-MCNC: 2.9 G/DL (ref 1.9–3.5)
GLUCOSE SERPL-MCNC: 81 MG/DL (ref 65–99)
HDLC SERPL-MCNC: 56 MG/DL
LDLC SERPL CALC-MCNC: 115 MG/DL
POTASSIUM SERPL-SCNC: 4 MMOL/L (ref 3.6–5.5)
PROT SERPL-MCNC: 7 G/DL (ref 6–8.2)
PTH-INTACT SERPL-MCNC: 62.4 PG/ML (ref 14–72)
SODIUM SERPL-SCNC: 140 MMOL/L (ref 135–145)
TRIGL SERPL-MCNC: 122 MG/DL (ref 0–149)

## 2019-05-15 PROCEDURE — 83970 ASSAY OF PARATHORMONE: CPT

## 2019-05-15 PROCEDURE — 80053 COMPREHEN METABOLIC PANEL: CPT

## 2019-05-15 PROCEDURE — 82306 VITAMIN D 25 HYDROXY: CPT

## 2019-05-15 PROCEDURE — 80061 LIPID PANEL: CPT

## 2019-05-15 PROCEDURE — 36415 COLL VENOUS BLD VENIPUNCTURE: CPT

## 2019-05-16 ENCOUNTER — SLEEP CENTER VISIT (OUTPATIENT)
Dept: SLEEP MEDICINE | Facility: MEDICAL CENTER | Age: 74
End: 2019-05-16
Payer: MEDICARE

## 2019-05-16 VITALS
HEART RATE: 62 BPM | DIASTOLIC BLOOD PRESSURE: 70 MMHG | HEIGHT: 65 IN | OXYGEN SATURATION: 94 % | SYSTOLIC BLOOD PRESSURE: 108 MMHG | WEIGHT: 161 LBS | BODY MASS INDEX: 26.82 KG/M2 | RESPIRATION RATE: 15 BRPM

## 2019-05-16 DIAGNOSIS — J45.20 MILD INTERMITTENT ASTHMA WITHOUT COMPLICATION: ICD-10-CM

## 2019-05-16 DIAGNOSIS — G47.33 OSA (OBSTRUCTIVE SLEEP APNEA): ICD-10-CM

## 2019-05-16 PROCEDURE — 99213 OFFICE O/P EST LOW 20 MIN: CPT | Performed by: FAMILY MEDICINE

## 2019-05-16 RX ORDER — ZOLPIDEM TARTRATE 5 MG/1
5 TABLET ORAL NIGHTLY PRN
Qty: 2 TAB | Refills: 0 | Status: SHIPPED | OUTPATIENT
Start: 2019-05-16 | End: 2019-08-06 | Stop reason: SDUPTHER

## 2019-05-16 NOTE — PROGRESS NOTES
[facility] Sleep Center Follow Up Note     Date: 5/16/2019 / Time: 11:40 AM    Patient ID:   Name:             Martha Ordonez   YOB: 1945  Age:                 73 y.o.  female   MRN:               [id number]      Thank you for requesting a sleep medicine consultation on Martha Ordonez at the sleep center. She presents today with the chief complaints of VALERIE follow up.     HISTORY OF PRESENT ILLNESS:       Pt is currently on dental appliance but not able to tolerate due to TMJ pain and a broken tooth since the start. She goes to sleep around 11 pm and wakes up around 7-8 am. She is getting about 7 hrs of sleep on a good night and about 4-5 hr of sleep on a bad night. The bad nights are about 1-3 per week. Overall,  She does  finds her sleep refreshing. When She  wakes up in the morning, She does not feel tired. She  Does not take regular naps.       SLEEP HISTORY   HST which showed   Moderate  obstructive sleep apnea with  Respiratory Event Index (BEBETO) of 19.7 per hour and worse in supine sleep with BEBETO at 32/hr. O2 Sat. kwadwo was 80% and mean O2 sat was 91% and baseline O2 at 95 %. O2 sat was below 90% for 31% of the flow evaluation time. Oxygen Desaturation (>=4%) Index was elevated at 21.3/hr.      PSG showed mild obstructive and central respiratory events noted. The overall AHI was 14.7  events per hour comprised of 29% hypopneas, 38% obstructive apneas and 33% central apneas. The events were associated with desaturations to a kwadwo of 83%.The sleep efficiency was very poor 23% with only 85 minutes total sleep time obtained.  REVIEW OF SYSTEMS:       Constitutional: Denies fevers, Denies weight changes  Eyes: Denies changes in vision, no eye pain  Ears/Nose/Throat/Mouth: Denies nasal congestion or sore throat   Cardiovascular: Denies chest pain or palpitations   Respiratory: Denies shortness of breath , Denies cough  Gastrointestinal/Hepatic: Denies abdominal pain, nausea, vomiting,  diarrhea, constipation or GI bleeding   Genitourinary: Deniesdysuria or frequency  Musculoskeletal/Rheum: Denies  joint pain and swelling   Skin/Breast: Denies rash,   Neurological: Denies headache, confusion, memory loss or focal weakness/parasthesias  Psychiatric: denies mood disorder   Sleep: + snoring     Comprehensive review of systems form is reviewed with the patient and is attached in the EMR.     PMH:  has a past medical history of Abnormal thyroid function test (9/10/2014); Allergic rhinitis; Anxiety; Asthma; Back pain; Basal cell carcinoma; Bronchitis; Cardiac arrhythmia; Depression; Elevated liver enzymes (9/10/2014); Family history of melanoma; GERD (gastroesophageal reflux disease); Glaucoma suspect of both eyes; Hyperlipidemia; Hypertension; Nasal drainage; OCD (obsessive compulsive disorder); Pancreatitis; Parathyroid disorder (HCC); PCOS (polycystic ovarian syndrome); S/P ERCP; and Transient global amnesia.  MEDS:   Current Outpatient Prescriptions:   •  fluconazole (DIFLUCAN) 150 MG tablet, PLEASE SEE ATTACHED FOR DETAILED DIRECTIONS, Disp: 2 Tab, Rfl: 0  •  losartan (COZAAR) 25 MG Tab, Take 1 Tab by mouth every day., Disp: 90 Tab, Rfl: 3  •  raNITidine (ZANTAC) 150 MG Tab, TAKE 1 TAB BY MOUTH 2 TIMES A DAY., Disp: 180 Tab, Rfl: 3  •  albuterol (PROVENTIL) 2.5mg/3ml Nebu Soln solution for nebulization, 3 mL by Nebulization route every 6 hours as needed for Shortness of Breath., Disp: 30 Bullet, Rfl: 3  •  Misc. Devices Misc, Please provide 1 nebulizer advised to use albuterol nebulizer., Disp: 1 Device, Rfl: 0  •  latanoprost (XALATAN) 0.005 % Solution, INSTILL ONE DROP IN BOTH EYES AT BEDTIME, Disp: , Rfl: 3  •  VOLTAREN 1 % Gel, APPLY TO AFFECTED AREA 2 TO 3 TIMES PER DAY AS NEEDED FOR PAIN, Disp: , Rfl: 0  •  acetaminophen (TYLENOL) 500 MG Tab, Take 500 mg by mouth every 6 hours as needed., Disp: , Rfl:   •  sertraline (ZOLOFT) 25 MG tablet, Take 1 Tab by mouth every day., Disp: 90 Tab, Rfl:  3  •  amLODIPine (NORVASC) 2.5 MG Tab, Take 1 Tab by mouth 1 time daily as needed. (Patient not taking: Reported on 2/12/2019), Disp: 90 Tab, Rfl: 3  •  Cholecalciferol (VITAMIN D) 2000 UNIT Tab, Take 4,000 Units by mouth., Disp: , Rfl:   •  albuterol (VENTOLIN HFA) 108 (90 Base) MCG/ACT Aero Soln inhalation aerosol, Inhale 2 Puffs by mouth every 6 hours as needed., Disp: 18 Inhaler, Rfl: 3  •  aspirin EC (ECOTRIN) 81 MG Tablet Delayed Response, Take 81 mg by mouth., Disp: , Rfl:   •  cetirizine (ZYRTEC) 10 MG TABS, Take 10 mg by mouth 1 time daily as needed., Disp: , Rfl:   ALLERGIES:   Allergies   Allergen Reactions   • Ace Inhibitors      Cough   • Ceclor [Cefaclor] Hives   • Ceftin Hives   • Cephalosporins Rash   • Ciprofloxacin Rash   • Kenalog Hives   • Lamisil [Terbinafine Hcl]    • Latex Rash   • Merthiolate [Thimerosal] Hives   • Monistat [Tioconazole] Itching   • Pcn [Penicillins] Hives   • Benzalkonium Chloride Rash   • Tetracyclines Rash     SURGHX:   Past Surgical History:   Procedure Laterality Date   • ABDOMINAL HYSTERECTOMY TOTAL      ovaries remain   • ADENOIDECTOMY     • CARDIAC CATH, RIGHT HEART      normal, EKG was Abnormal    • CHOLECYSTECTOMY     • DILATION AND CURETTAGE     • EYE SURGERY      b/l iridotomy   • HYSTERECTOMY LAPAROSCOPY     • LUMPECTOMY      benign   • TONSILLECTOMY     • US-NEEDLE CORE BX-BREAST PANEL       SOCHX:  reports that she has never smoked. She has never used smokeless tobacco. She reports that she does not drink alcohol or use drugs..  FH:   Family History   Problem Relation Age of Onset   • Hypertension Mother    • Heart Disease Father         CHF   • Cancer Father         melanoma   • Hypertension Sister    • Stroke Sister    • Sleep Apnea Sister    • Cancer Paternal Grandfather         colon   • Heart Disease Paternal Grandfather    • Stroke Paternal Grandfather    • Diabetes Paternal Grandfather    • Other Maternal Grandmother         eplepsey   • Heart Attack  "Maternal Grandfather    • Stroke Maternal Grandfather    • Stroke Paternal Grandmother    • Sleep Apnea Sister          Physical Exam:  Vitals/ General Appearance:   Weight/BMI: Body mass index is 26.79 kg/m².  /70 (BP Location: Left arm, Patient Position: Sitting, BP Cuff Size: Adult)   Pulse 62   Resp 15   Ht 1.651 m (5' 5\")   Wt 73 kg (161 lb)   SpO2 94%   Vitals:    05/16/19 1135   BP: 108/70   BP Location: Left arm   Patient Position: Sitting   BP Cuff Size: Adult   Pulse: 62   Resp: 15   SpO2: 94%   Weight: 73 kg (161 lb)   Height: 1.651 m (5' 5\")       Pt. is alert and oriented to time, place and person. Cooperative and in no apparent distress.       1. Head: Atraumatic, normocephalic.   2. Ears: Normal tympanic membrane and no discharge  3. Nose: No inferior turbinate hypertophy   4. Throat: Oropharynx appears crowded in that the palate is overhanging  5. Neck: Supple. No thyromegaly  6. Chest: Trachea central, no spine deformity   7. Lungs auscultation: B/L good air entry, vesicular breath sounds, no adventitious sounds  8. Heart auscultation: 1st and 2nd heart sounds normal, regular rhythm. No appreciable murmur.  9. . Extremities:  no pedal edema.  10. Skin: No rash  11. NEUROLOGICAL EXAMINATION: On neurological exam, the patient was alert and oriented x3. speech was clear and fluent without dysarthria.      ASSESSMENT AND PLAN     1.Obstructive Sleep Apnea    The pathophysiology of sleep anea and the increased risk of cardiovascular morbidity from untreated sleep apnea is discussed in detail with the patient     She is urged to avoid supine sleep, weight gain and alcoholic beverages since all of these can worsen sleep apnea. She is cautioned against drowsy driving. If She feels sleepy while driving, She must pull over for a break/nap, rather than persist on the road, in the interest of She own safety and that of others on the road.   Plan   - Continue dental appliance in meanwhile   - Auto " CPAP vs overnight CPAP titration vs dental appliance and surgeries was dicussed in detail. After informed discussion CPAP  titration is ordered today due to OAT intolerance.      2. Regarding treatment of other past medical problems and general health maintenance,  She is urged to follow up with PCP.

## 2019-05-20 RX ORDER — ALBUTEROL SULFATE 90 UG/1
2 AEROSOL, METERED RESPIRATORY (INHALATION) EVERY 6 HOURS PRN
Qty: 18 INHALER | Refills: 3 | Status: SHIPPED | OUTPATIENT
Start: 2019-05-20 | End: 2019-10-29

## 2019-05-20 NOTE — TELEPHONE ENCOUNTER
[id number] - [place] - [address]  [address]  [place] [address]  Phone: [phone] Fax: [phone]    Received a fax from the pharmacy stating Pt's insurance does not cover Proair, but it does cover Ventolin.   Please send new Rx

## 2019-05-22 ENCOUNTER — OFFICE VISIT (OUTPATIENT)
Dept: MEDICAL GROUP | Age: 74
End: 2019-05-22
Payer: MEDICARE

## 2019-05-22 VITALS
SYSTOLIC BLOOD PRESSURE: 104 MMHG | BODY MASS INDEX: 26.82 KG/M2 | WEIGHT: 161 LBS | DIASTOLIC BLOOD PRESSURE: 66 MMHG | TEMPERATURE: 98.1 F | HEART RATE: 68 BPM | OXYGEN SATURATION: 95 % | HEIGHT: 65 IN

## 2019-05-22 DIAGNOSIS — Z78.0 POSTMENOPAUSAL ESTROGEN DEFICIENCY: ICD-10-CM

## 2019-05-22 DIAGNOSIS — R79.89 INCREASED PTH LEVEL: ICD-10-CM

## 2019-05-22 DIAGNOSIS — Z12.31 VISIT FOR SCREENING MAMMOGRAM: ICD-10-CM

## 2019-05-22 DIAGNOSIS — E55.9 VITAMIN D INSUFFICIENCY: ICD-10-CM

## 2019-05-22 DIAGNOSIS — E78.00 PURE HYPERCHOLESTEROLEMIA: ICD-10-CM

## 2019-05-22 DIAGNOSIS — F41.9 ANXIETY: ICD-10-CM

## 2019-05-22 DIAGNOSIS — I10 ESSENTIAL HYPERTENSION: ICD-10-CM

## 2019-05-22 DIAGNOSIS — R82.998 DARK URINE: ICD-10-CM

## 2019-05-22 PROCEDURE — 99214 OFFICE O/P EST MOD 30 MIN: CPT | Performed by: INTERNAL MEDICINE

## 2019-05-22 RX ORDER — OLMESARTAN MEDOXOMIL 5 MG/1
5 TABLET ORAL DAILY
Qty: 100 TAB | Refills: 3 | Status: SHIPPED | OUTPATIENT
Start: 2019-05-22 | End: 2019-06-28 | Stop reason: SDUPTHER

## 2019-05-22 ASSESSMENT — PAIN SCALES - GENERAL: PAINLEVEL: NO PAIN

## 2019-05-22 ASSESSMENT — ENCOUNTER SYMPTOMS: GENERAL WELL-BEING: GOOD

## 2019-05-22 ASSESSMENT — ACTIVITIES OF DAILY LIVING (ADL): BATHING_REQUIRES_ASSISTANCE: 0

## 2019-05-22 ASSESSMENT — PATIENT HEALTH QUESTIONNAIRE - PHQ9: CLINICAL INTERPRETATION OF PHQ2 SCORE: 0

## 2019-05-22 NOTE — PROGRESS NOTES
Subjective:   Deb Bangura is a 73 y.o. female here today for evaluation and management of:      Increased PTH level  Chronic. Patient is followed by endocrinology at [facility] and current plan is to do blood work every 6 months to monitor levels. She denies any current symptoms. Plan to continue 6 month screening tests of chemistry panel and parathyroid hormone.    Essential hypertension  Chronic. Patient states she has not used amlodipine 2.5 mg PRN but has been compliant with losartan 25 mg QD without side effects.  Patient concerned that losartan was recalled and she wants to switch to different anti-hypertensive agent. Losartan was discontinued today and she was initiated on olmesartan 5 mg QD. I reviewed potential side effects of olmesartan with the patient.    Pure hypercholesterolemia  Chronic. Patient has previously been on a statin and states that she had memory issues while on the medication. She declines medication control at this time and plans to continue with lifestyle modification. I did review recent blood work with the patient and informed her of ASCVD risk score of 11.2%.  I discussed at length the risks and benefits of statin treatment in the past and today's office visit, but patient declined to take statin.    Results for DEB BANGURA (MRN [id number]) as of 5/22/2019 10:42   Ref. Range 6/7/2018 10:22 1/2/2019 08:10 5/15/2019 10:52   Cholesterol,Tot Latest Ref Range: 100 - 199 mg/dL 181 207 (H) 195   Triglycerides Latest Ref Range: 0 - 149 mg/dL 145 104 122   HDL Latest Ref Range: >=40 mg/dL 64 62 56   LDL Latest Ref Range: <100 mg/dL 88 124 (H) 115 (H)     Anxiety  Chronic. Patient reports well controlled on Sertraline 25 mg QD, no side effects reported.     Postmenopausal estrogen deficiency  Chronic. Patient had most recent DEXA 11/2017 which indicated osteopenia. She has been taking vitamin D 4000 IU QD without side effects. She denies any recent falls, trauma, or fractures.    Dark  urine  Patient reports having dark urine when she voids in the morning, which is abnormal for her. She denies other urinary symptoms including dysuria, urgency, or frequency. She does report 1 prior UTI which presented as gross hematuria.    Current medicines (including changes today)  Current Outpatient Prescriptions   Medication Sig Dispense Refill   • olmesartan (BENICAR) 5 MG tablet Take 1 Tab by mouth every day. 100 Tab 3   • albuterol (VENTOLIN HFA) 108 (90 Base) MCG/ACT Aero Soln inhalation aerosol Inhale 2 Puffs by mouth every 6 hours as needed. 18 Inhaler 3   • raNITidine (ZANTAC) 150 MG Tab TAKE 1 TAB BY MOUTH 2 TIMES A DAY. 180 Tab 3   • albuterol (PROVENTIL) 2.5mg/3ml Nebu Soln solution for nebulization 3 mL by Nebulization route every 6 hours as needed for Shortness of Breath. 30 Bullet 3   • Misc. Devices Misc Please provide 1 nebulizer advised to use albuterol nebulizer. 1 Device 0   • VOLTAREN 1 % Gel APPLY TO AFFECTED AREA 2 TO 3 TIMES PER DAY AS NEEDED FOR PAIN  0   • acetaminophen (TYLENOL) 500 MG Tab Take 500 mg by mouth every 6 hours as needed.     • sertraline (ZOLOFT) 25 MG tablet Take 1 Tab by mouth every day. 90 Tab 3   • Cholecalciferol (VITAMIN D) 2000 UNIT Tab Take 4,000 Units by mouth.     • aspirin EC (ECOTRIN) 81 MG Tablet Delayed Response Take 81 mg by mouth.     • cetirizine (ZYRTEC) 10 MG TABS Take 10 mg by mouth 1 time daily as needed.       No current facility-administered medications for this visit.      She  has a past medical history of Abnormal thyroid function test (9/10/2014); Allergic rhinitis; Anxiety; Asthma; Back pain; Basal cell carcinoma; Bronchitis; Cardiac arrhythmia; Depression; Elevated liver enzymes (9/10/2014); Family history of melanoma; GERD (gastroesophageal reflux disease); Glaucoma suspect of both eyes; Hyperlipidemia; Hypertension; Nasal drainage; OCD (obsessive compulsive disorder); Pancreatitis; Parathyroid disorder (HCC); PCOS (polycystic ovarian  "syndrome); S/P ERCP; and Transient global amnesia.    ROS   No chest pain, no shortness of breath, no abdominal pain       Objective:     /66 (BP Location: Left arm, Patient Position: Sitting, BP Cuff Size: Adult)   Pulse 68   Temp 36.7 °C (98.1 °F) (Temporal)   Ht 1.651 m (5' 5\")   Wt 73 kg (161 lb)   SpO2 95%  Body mass index is 26.79 kg/m².   Physical Exam:  General: Alert, oriented and no acute distress.  Eye contact is good, speech goal directed, affect calm  HEENT: conjunctiva non-injected, sclera non-icteric.  Oral mucous membranes pink and moist with no lesions.  Pinna normal.   Lungs: Normal respiratory effort, clear to auscultation bilaterally with good excursion.  CV: regular rate and rhythm. No murmurs.   Abdomen: soft, non distended, nontender, No CVAT, Bowel sound normal.  Ext: no edema, color normal, vascularity normal, temperature normal  Musculoskeletal exam: moving all extremities freely      Assessment and Plan:   The following treatment plan was discussed     1. Increased PTH level  - Patient continues to follow with endocrinology at [facility], and plans to monitor every 6 months with chemistry panel and parathyroid level tests. No current symptoms.  Recheck lab in 6 months for follow-up.  - PTH INTACT (PTH ONLY); Future  - DS-BONE DENSITY STUDY (DEXA); Future  - URINALYSIS,CULTURE IF INDICATED; Future    2. Essential hypertension  - Patient was discontinued on losartan 25 mg QD and was initiated on olmesartan 5 mg QD. I reviewed potential side effects of olmesartan with the patient today including dizziness or lightheadedness. If BP is not well controlled on olmesartan 5 mg QD, she may increase dose to olmesartan 10 mg QD.  - Recommend to monitor blood pressure and heart rate at home.  - olmesartan (BENICAR) 5 MG tablet; Take 1 Tab by mouth every day.  Dispense: 100 Tab; Refill: 3  - CBC WITH DIFFERENTIAL; Future  - Comp Metabolic Panel; Future  - URINALYSIS,CULTURE IF INDICATED; " Future    3. Pure hypercholesterolemia  - Patient states that she would not like to be on a statin and that she would like to do conservative treatment with lifestyle modification. I informed her of ASCVD risk score of 11.2%.   - Advised to eat low fat, low carbohydrate and high fiber diet as well as do cardio and strength training physical exercise regularly.   - Comp Metabolic Panel; Future  - Lipid Profile; Future    4. Anxiety  - Patient states well controlled on Sertraline 25 mg QD, no side effects reported. Plan to continue current regimen.   - Discussed with patient regarding the use and side effects of medication as well as black box warning of suicidality risk with medication. Patient verbally understands. Recommend to call 911 or go to ER if patient has suicidal ideation or plan.    5. Postmenopausal estrogen deficiency  - Patient due for DEXA in 11/2019. She has been taking cholecalciferol 4000 IU QD. No recent trauma or fractures.  - DS-BONE DENSITY STUDY (DEXA); Future  - URINALYSIS,CULTURE IF INDICATED; Future    6. Vitamin D insufficiency  - She has been taking cholecalciferol 4000 IU QD. No recent trauma or fractures. Continue current regimen. Counseled on vitamin D toxicity.  Repeat blood test in 6 months.  - VITAMIN D,25 HYDROXY; Future  - URINALYSIS,CULTURE IF INDICATED; Future    7. Dark urine  - Patient reports dark urine in mornings without other urinary symptoms and is requesting UA, ordered as below. She was encouraged to drink lots of water.  Ordered UA for further evaluation.  - URINALYSIS,CULTURE IF INDICATED; Future    8. Visit for screening mammogram  - Diagnostic mammogram completed 6/25/18, and the report did not show any new lesion.  Patient denies any new concern or new lump or breast pain.    - She is due for routine screening mammogram to repeat on or after 6/25/2019.  - MA-SCREEN MAMMO W/CAD-BILAT; Future        Health Maintenance reviewed. DEXA due 11/2019. Diagnostic mammogram  completed 6/25/18, order for repeat placed for 06/2019.    Follow up: Return in about 6 months (around 11/22/2019), or if symptoms worsen or fail to improve, for Elevated PTH, Hypertension, Hyperlipidemia, Anxiety, Lab review.    I, Lore Biswas (Scribe), am scribing for, and in the presence of, Ruchi Plasencia M.D..  ?   Electronically signed by: Lore Biswas (Scribe), 5/22/2019  ?  I, Ruchi Plasencia M.D., personally performed the services described in this documentation, as scribed by Lore Biswas in my presence, and it is both accurate and complete.    Please note that this dictation was created using voice recognition software. I have made every reasonable attempt to correct obvious errors, but I expect that there may have unintended errors in text, spelling, punctuation, or grammar that I did not discover.

## 2019-05-31 ENCOUNTER — PATIENT MESSAGE (OUTPATIENT)
Dept: MEDICAL GROUP | Age: 74
End: 2019-05-31

## 2019-05-31 NOTE — LETTER
Gretchen 3, 2019      Re:  Martha Ordonez  [address]  [place] [address]        To whom it may concern,    Both Mr. Rick Ordonez and Mrs. Martha Ordonez are under my care.  Both of them are not able to move heavy objects more than 25 pounds due to their age and chronic medical conditions.     If you have any questions or concerns, please don't hesitate to call.        Sincerely,        Ruchi Plasencia M.D.    Electronically Signed

## 2019-06-03 ENCOUNTER — HOSPITAL ENCOUNTER (OUTPATIENT)
Facility: MEDICAL CENTER | Age: 74
End: 2019-06-03
Attending: INTERNAL MEDICINE
Payer: MEDICARE

## 2019-06-03 DIAGNOSIS — Z78.0 POSTMENOPAUSAL ESTROGEN DEFICIENCY: ICD-10-CM

## 2019-06-03 DIAGNOSIS — I10 ESSENTIAL HYPERTENSION: ICD-10-CM

## 2019-06-03 DIAGNOSIS — R82.998 DARK URINE: ICD-10-CM

## 2019-06-03 DIAGNOSIS — R79.89 INCREASED PTH LEVEL: ICD-10-CM

## 2019-06-03 DIAGNOSIS — E55.9 VITAMIN D INSUFFICIENCY: ICD-10-CM

## 2019-06-03 LAB
APPEARANCE UR: CLEAR
BILIRUB UR QL STRIP.AUTO: NEGATIVE
COLOR UR: YELLOW
GLUCOSE UR STRIP.AUTO-MCNC: NEGATIVE MG/DL
KETONES UR STRIP.AUTO-MCNC: NEGATIVE MG/DL
LEUKOCYTE ESTERASE UR QL STRIP.AUTO: NEGATIVE
MICRO URNS: NORMAL
NITRITE UR QL STRIP.AUTO: NEGATIVE
PH UR STRIP.AUTO: 6.5 [PH]
PROT UR QL STRIP: NEGATIVE MG/DL
RBC UR QL AUTO: NEGATIVE
SP GR UR STRIP.AUTO: 1.01
UROBILINOGEN UR STRIP.AUTO-MCNC: 0.2 MG/DL

## 2019-06-03 PROCEDURE — 81003 URINALYSIS AUTO W/O SCOPE: CPT

## 2019-06-03 NOTE — PATIENT COMMUNICATION
Please call to inform patient to come to our office to  the letter for MEMO.    Ruchi Plasencia M.D.

## 2019-06-03 NOTE — TELEPHONE ENCOUNTER
"From: Martha Ordonez  To: Ruchi Plasencia M.D.  Sent: 5/31/2019 11:28 AM PDT  Subject: Non-Urgent Medical Question    Dr. Plasencia,  I need your help. We have chairs on our driveway so we can sit outside where it is shaded in the afternoon as we've both had facial Mohs surgery. Our MEMO wants us to pull the chairs into the garage when they are not in use. They are too heavy for us to move due to Aubrey's hip/back pain and the fact I had an A & P repair and am never to lift more than 25 pounds. The chairs have to be heavy with wide arms so that they can support Aubrey's weight and also assist him in getting into and out of the chairs. He is having some balance issues as well and so folding chairs would not be safe for him. The MEMO wants our MD to verify this. A letter \"To whom is may concern\" will suffice.  Thank you.  Martha Ordonez  [phone]    "

## 2019-06-24 ENCOUNTER — SLEEP STUDY (OUTPATIENT)
Dept: SLEEP MEDICINE | Facility: MEDICAL CENTER | Age: 74
End: 2019-06-24
Attending: FAMILY MEDICINE
Payer: MEDICARE

## 2019-06-24 DIAGNOSIS — G47.33 OSA (OBSTRUCTIVE SLEEP APNEA): ICD-10-CM

## 2019-06-24 PROCEDURE — 95811 POLYSOM 6/>YRS CPAP 4/> PARM: CPT | Performed by: INTERNAL MEDICINE

## 2019-06-27 ENCOUNTER — PATIENT MESSAGE (OUTPATIENT)
Dept: MEDICAL GROUP | Age: 74
End: 2019-06-27

## 2019-06-27 DIAGNOSIS — I10 ESSENTIAL HYPERTENSION: ICD-10-CM

## 2019-06-28 RX ORDER — OLMESARTAN MEDOXOMIL 5 MG/1
5 TABLET ORAL DAILY
Qty: 100 TAB | Refills: 3 | Status: SHIPPED | OUTPATIENT
Start: 2019-06-28 | End: 2019-07-22 | Stop reason: SDUPTHER

## 2019-07-08 ENCOUNTER — OFFICE VISIT (OUTPATIENT)
Dept: URGENT CARE | Facility: CLINIC | Age: 74
End: 2019-07-08
Payer: MEDICARE

## 2019-07-08 ENCOUNTER — APPOINTMENT (OUTPATIENT)
Dept: SLEEP MEDICINE | Facility: MEDICAL CENTER | Age: 74
End: 2019-07-08
Payer: MEDICARE

## 2019-07-08 ENCOUNTER — PATIENT MESSAGE (OUTPATIENT)
Dept: SLEEP MEDICINE | Facility: MEDICAL CENTER | Age: 74
End: 2019-07-08

## 2019-07-08 ENCOUNTER — HOSPITAL ENCOUNTER (OUTPATIENT)
Facility: MEDICAL CENTER | Age: 74
End: 2019-07-08
Attending: NURSE PRACTITIONER
Payer: MEDICARE

## 2019-07-08 VITALS
HEIGHT: 65 IN | RESPIRATION RATE: 16 BRPM | BODY MASS INDEX: 26.49 KG/M2 | DIASTOLIC BLOOD PRESSURE: 72 MMHG | HEART RATE: 60 BPM | SYSTOLIC BLOOD PRESSURE: 118 MMHG | WEIGHT: 159 LBS | OXYGEN SATURATION: 98 % | TEMPERATURE: 97.8 F

## 2019-07-08 DIAGNOSIS — R31.9 URINARY TRACT INFECTION WITH HEMATURIA, SITE UNSPECIFIED: ICD-10-CM

## 2019-07-08 DIAGNOSIS — N39.0 URINARY TRACT INFECTION WITH HEMATURIA, SITE UNSPECIFIED: ICD-10-CM

## 2019-07-08 DIAGNOSIS — R30.0 DYSURIA: ICD-10-CM

## 2019-07-08 DIAGNOSIS — Z86.19 HISTORY OF CANDIDIASIS: ICD-10-CM

## 2019-07-08 LAB
APPEARANCE UR: CLEAR
BILIRUB UR STRIP-MCNC: NORMAL MG/DL
COLOR UR AUTO: YELLOW
GLUCOSE UR STRIP.AUTO-MCNC: NORMAL MG/DL
KETONES UR STRIP.AUTO-MCNC: NORMAL MG/DL
LEUKOCYTE ESTERASE UR QL STRIP.AUTO: NORMAL
NITRITE UR QL STRIP.AUTO: NORMAL
PH UR STRIP.AUTO: 7 [PH] (ref 5–8)
PROT UR QL STRIP: NORMAL MG/DL
RBC UR QL AUTO: NORMAL
SP GR UR STRIP.AUTO: 1
UROBILINOGEN UR STRIP-MCNC: 0.2 MG/DL

## 2019-07-08 PROCEDURE — 87086 URINE CULTURE/COLONY COUNT: CPT

## 2019-07-08 PROCEDURE — 87186 SC STD MICRODIL/AGAR DIL: CPT

## 2019-07-08 PROCEDURE — 99214 OFFICE O/P EST MOD 30 MIN: CPT | Performed by: NURSE PRACTITIONER

## 2019-07-08 PROCEDURE — 87077 CULTURE AEROBIC IDENTIFY: CPT

## 2019-07-08 PROCEDURE — 81002 URINALYSIS NONAUTO W/O SCOPE: CPT | Performed by: NURSE PRACTITIONER

## 2019-07-08 RX ORDER — PHENAZOPYRIDINE HYDROCHLORIDE 200 MG/1
200 TABLET, FILM COATED ORAL 3 TIMES DAILY
Qty: 6 TAB | Refills: 0 | Status: SHIPPED | OUTPATIENT
Start: 2019-07-08 | End: 2019-07-10

## 2019-07-08 RX ORDER — FLUCONAZOLE 150 MG/1
150 TABLET ORAL DAILY
Qty: 1 TAB | Refills: 0 | Status: SHIPPED | OUTPATIENT
Start: 2019-07-08 | End: 2019-07-11

## 2019-07-08 RX ORDER — NITROFURANTOIN 25; 75 MG/1; MG/1
100 CAPSULE ORAL EVERY 12 HOURS
Qty: 10 CAP | Refills: 0 | Status: SHIPPED | OUTPATIENT
Start: 2019-07-08 | End: 2019-07-13

## 2019-07-08 ASSESSMENT — ENCOUNTER SYMPTOMS
FLANK PAIN: 0
FEVER: 0
CHILLS: 0

## 2019-07-08 NOTE — PROGRESS NOTES
Subjective:      Martha Ordonez is a 74 y.o. female who presents with Dysuria            Dysuria    This is a new problem. Episode onset: pt reports new onset of pain with urination that developed this morning. She also reports blood in her urine. denies any fever or flank pain. The problem occurs every urination. The problem has been unchanged. The quality of the pain is described as burning. There has been no fever. She is not sexually active. There is no history of pyelonephritis. Associated symptoms include frequency, hematuria, hesitancy and urgency. Pertinent negatives include no chills, discharge or flank pain. She has tried increased fluids for the symptoms. The treatment provided no relief. There is no history of recurrent UTIs.       Review of Systems   Constitutional: Negative for chills and fever.   Genitourinary: Positive for dysuria, frequency, hematuria, hesitancy and urgency. Negative for flank pain.   All other systems reviewed and are negative.    Past Medical History:   Diagnosis Date   • Abnormal thyroid function test 9/10/2014   • Allergic rhinitis    • Anxiety    • Asthma    • Back pain    • Basal cell carcinoma     squamos cell right ear   • Bronchitis    • Cardiac arrhythmia    • Depression    • Elevated liver enzymes 9/10/2014   • Family history of melanoma    • GERD (gastroesophageal reflux disease)    • Glaucoma suspect of both eyes    • Hyperlipidemia    • Hypertension    • Nasal drainage    • OCD (obsessive compulsive disorder)    • Pancreatitis    • Parathyroid disorder (HCC)    • PCOS (polycystic ovarian syndrome)    • S/P ERCP    • Transient global amnesia       Past Surgical History:   Procedure Laterality Date   • ABDOMINAL HYSTERECTOMY TOTAL      ovaries remain   • ADENOIDECTOMY     • CARDIAC CATH, RIGHT HEART      normal, EKG was Abnormal    • CHOLECYSTECTOMY     • DILATION AND CURETTAGE     • EYE SURGERY      b/l iridotomy   • HYSTERECTOMY LAPAROSCOPY     • LUMPECTOMY       "benign   • TONSILLECTOMY     • US-NEEDLE CORE BX-BREAST PANEL        Social History     Social History   • Marital status:      Spouse name: N/A   • Number of children: N/A   • Years of education: N/A     Occupational History   • RN- retired      Social History Main Topics   • Smoking status: Never Smoker   • Smokeless tobacco: Never Used   • Alcohol use No   • Drug use: No   • Sexual activity: Yes     Partners: Male      Comment: , retired RN     Other Topics Concern   •  Service No   • Blood Transfusions No   • Caffeine Concern No   • Occupational Exposure No   • Hobby Hazards No   • Sleep Concern Yes   • Stress Concern No   • Weight Concern Yes   • Special Diet Yes   • Back Care No   • Exercise Yes   • Bike Helmet Yes   • Seat Belt Yes   • Self-Exams Yes     Social History Narrative    , 2 children- daughters.           Objective:     /72   Pulse 60   Temp 36.6 °C (97.8 °F) (Temporal)   Resp 16   Ht 1.651 m (5' 5\")   Wt 72.1 kg (159 lb)   LMP  (LMP Unknown)   SpO2 98%   BMI 26.46 kg/m²      Physical Exam   Constitutional: She is oriented to person, place, and time. Vital signs are normal. She appears well-developed and well-nourished.   HENT:   Head: Normocephalic and atraumatic.   Eyes: Pupils are equal, round, and reactive to light. EOM are normal.   Neck: Normal range of motion.   Cardiovascular: Normal rate and regular rhythm.    Pulmonary/Chest: Effort normal.   Abdominal: Soft. Normal appearance. There is tenderness in the suprapubic area. There is no rigidity, no rebound, no guarding and no CVA tenderness.   Musculoskeletal: Normal range of motion.   Neurological: She is alert and oriented to person, place, and time.   Skin: Skin is warm and dry. Capillary refill takes less than 2 seconds.   Psychiatric: She has a normal mood and affect. Her speech is normal and behavior is normal. Thought content normal.   Vitals reviewed.              Lab Results   Component " Value Date/Time    POCCOLOR YELLOW 07/08/2019 01:06 PM    POCAPPEAR CLEAR 07/08/2019 01:06 PM    POCLEUKEST SMALL 07/08/2019 01:06 PM    POCNITRITE neg 07/08/2019 01:06 PM    POCUROBILIGE 0.2 07/08/2019 01:06 PM    POCPROTEIN neg 07/08/2019 01:06 PM    POCURPH 7.0 07/08/2019 01:06 PM    POCBLOOD large 07/08/2019 01:06 PM    POCSPGRV 1.005 07/08/2019 01:06 PM    POCKETONES neg 07/08/2019 01:06 PM    POCBILIRUBIN neg 07/08/2019 01:06 PM    POCGLUCUA neg 07/08/2019 01:06 PM        Assessment/Plan:     1. History of candidiasis  - fluconazole (DIFLUCAN) 150 MG tablet; Take 1 Tab by mouth every day.  Dispense: 1 Tab; Refill: 0    2. Dysuria  - POCT Urinalysis    3. Urinary tract infection with hematuria, site unspecified  - Urine Culture; Future  - nitrofurantoin monohyd macro (MACROBID) 100 MG Cap; Take 1 Cap by mouth every 12 hours for 5 days.  Dispense: 10 Cap; Refill: 0  - phenazopyridine (PYRIDIUM) 200 MG Tab; Take 1 Tab by mouth 3 times a day for 2 days.  Dispense: 6 Tab; Refill: 0    Will call with cx results if I need to change abx  Take full course of antibiotics  Increase water intake  Tylenol and ibuprofen as needed for pain  Supportive care, differential diagnoses, and indications for immediate follow-up discussed with patient.    Pathogenesis of diagnosis discussed including typical length and natural progression.      Instructed to return to  or nearest emergency department if symptoms fail to improve, for any change in condition, further concerns, or new concerning symptoms.  Patient states understanding of the plan of care and discharge instructions.

## 2019-07-11 DIAGNOSIS — R31.9 URINARY TRACT INFECTION WITH HEMATURIA, SITE UNSPECIFIED: ICD-10-CM

## 2019-07-11 DIAGNOSIS — R11.0 NAUSEA: ICD-10-CM

## 2019-07-11 DIAGNOSIS — N39.0 URINARY TRACT INFECTION WITH HEMATURIA, SITE UNSPECIFIED: ICD-10-CM

## 2019-07-11 DIAGNOSIS — Z86.19 HISTORY OF CANDIDIASIS: ICD-10-CM

## 2019-07-11 DIAGNOSIS — G47.31 COMPLEX SLEEP APNEA SYNDROME: ICD-10-CM

## 2019-07-11 LAB
BACTERIA UR CULT: ABNORMAL
BACTERIA UR CULT: ABNORMAL
SIGNIFICANT IND 70042: ABNORMAL
SITE SITE: ABNORMAL
SOURCE SOURCE: ABNORMAL

## 2019-07-11 RX ORDER — FLUCONAZOLE 150 MG/1
150 TABLET ORAL DAILY
Qty: 1 TAB | Refills: 0 | Status: SHIPPED | OUTPATIENT
Start: 2019-07-11 | End: 2019-12-04

## 2019-07-11 RX ORDER — ONDANSETRON 4 MG/1
4 TABLET, ORALLY DISINTEGRATING ORAL EVERY 6 HOURS PRN
Qty: 10 TAB | Refills: 0 | Status: SHIPPED | OUTPATIENT
Start: 2019-07-11 | End: 2019-12-04

## 2019-07-11 RX ORDER — SULFAMETHOXAZOLE AND TRIMETHOPRIM 800; 160 MG/1; MG/1
1 TABLET ORAL 2 TIMES DAILY
Qty: 10 TAB | Refills: 0 | Status: SHIPPED | OUTPATIENT
Start: 2019-07-11 | End: 2019-07-16

## 2019-07-19 ENCOUNTER — PATIENT MESSAGE (OUTPATIENT)
Dept: MEDICAL GROUP | Age: 74
End: 2019-07-19

## 2019-07-19 DIAGNOSIS — I10 ESSENTIAL HYPERTENSION: ICD-10-CM

## 2019-07-22 RX ORDER — OLMESARTAN MEDOXOMIL 5 MG/1
10 TABLET ORAL DAILY
Qty: 200 TAB | Refills: 3 | Status: SHIPPED | OUTPATIENT
Start: 2019-07-22 | End: 2019-12-30

## 2019-07-22 NOTE — TELEPHONE ENCOUNTER
From: Martha Ordonez  To: Ruchi Plasencia M.D.  Sent: 7/19/2019 10:09 AM PDT  Subject: Prescription Question    Dr. Plasencia,  [facility] cannot fill my Benicar Rx until you send in a new Rx with the dose of 10 mg daily. The original Rx was for 5 mg. daily so that was 90 pills for a 90 day supply. You had advised that I could adjust the medication as needed to find the best dose for me. I've taken 5 mg, 10 mg, and 15 mg but the 10 mg. seems to control my BP sufficiently.  Even though there are refills on the original Rx, it is too early to get those filled based on the original dose of 5 mg. daily.   If you have questions, please call. This is urgent as I only have 4 more pills. My pharmacy is [facility] on [address].  I messaged before about this and received your assistants reply. I replied and have received no reply.  Thank you.  Martha Ordonez  [phone]

## 2019-07-22 NOTE — PATIENT COMMUNICATION
1. Caller Name: Martha Boateng Mery                                           Call Back Number: MyChart      Patient approves a detailed voicemail message: yes    Pt requesting Rx for Benicar 10mg daily be sent to her pharmacy.

## 2019-08-04 ENCOUNTER — PATIENT MESSAGE (OUTPATIENT)
Dept: SLEEP MEDICINE | Facility: MEDICAL CENTER | Age: 74
End: 2019-08-04

## 2019-08-04 DIAGNOSIS — G47.30 SLEEP DISORDER BREATHING: ICD-10-CM

## 2019-08-05 ENCOUNTER — APPOINTMENT (RX ONLY)
Dept: URBAN - METROPOLITAN AREA CLINIC 4 | Facility: CLINIC | Age: 74
Setting detail: DERMATOLOGY
End: 2019-08-05

## 2019-08-05 DIAGNOSIS — Z85.828 PERSONAL HISTORY OF OTHER MALIGNANT NEOPLASM OF SKIN: ICD-10-CM

## 2019-08-05 DIAGNOSIS — D22 MELANOCYTIC NEVI: ICD-10-CM

## 2019-08-05 DIAGNOSIS — Z71.89 OTHER SPECIFIED COUNSELING: ICD-10-CM

## 2019-08-05 DIAGNOSIS — D18.0 HEMANGIOMA: ICD-10-CM

## 2019-08-05 DIAGNOSIS — L57.8 OTHER SKIN CHANGES DUE TO CHRONIC EXPOSURE TO NONIONIZING RADIATION: ICD-10-CM

## 2019-08-05 DIAGNOSIS — L82.0 INFLAMED SEBORRHEIC KERATOSIS: ICD-10-CM

## 2019-08-05 DIAGNOSIS — L57.0 ACTINIC KERATOSIS: ICD-10-CM

## 2019-08-05 DIAGNOSIS — L81.4 OTHER MELANIN HYPERPIGMENTATION: ICD-10-CM

## 2019-08-05 DIAGNOSIS — L82.1 OTHER SEBORRHEIC KERATOSIS: ICD-10-CM

## 2019-08-05 PROBLEM — D18.01 HEMANGIOMA OF SKIN AND SUBCUTANEOUS TISSUE: Status: ACTIVE | Noted: 2019-08-05

## 2019-08-05 PROBLEM — D22.5 MELANOCYTIC NEVI OF TRUNK: Status: ACTIVE | Noted: 2019-08-05

## 2019-08-05 PROBLEM — D48.5 NEOPLASM OF UNCERTAIN BEHAVIOR OF SKIN: Status: ACTIVE | Noted: 2019-08-05

## 2019-08-05 PROCEDURE — 17003 DESTRUCT PREMALG LES 2-14: CPT

## 2019-08-05 PROCEDURE — ? LIQUID NITROGEN

## 2019-08-05 PROCEDURE — ? BIOPSY BY SHAVE METHOD

## 2019-08-05 PROCEDURE — ? ADDITIONAL NOTES

## 2019-08-05 PROCEDURE — 17000 DESTRUCT PREMALG LESION: CPT | Mod: 59

## 2019-08-05 PROCEDURE — 11102 TANGNTL BX SKIN SINGLE LES: CPT

## 2019-08-05 PROCEDURE — 99214 OFFICE O/P EST MOD 30 MIN: CPT | Mod: 25

## 2019-08-05 PROCEDURE — ? COUNSELING

## 2019-08-05 PROCEDURE — 11103 TANGNTL BX SKIN EA SEP/ADDL: CPT

## 2019-08-05 ASSESSMENT — LOCATION DETAILED DESCRIPTION DERM
LOCATION DETAILED: LEFT PROXIMAL POSTERIOR UPPER ARM
LOCATION DETAILED: RIGHT PROXIMAL DORSAL FOREARM
LOCATION DETAILED: RIGHT SUPERIOR MEDIAL MIDBACK
LOCATION DETAILED: RIGHT INFERIOR CENTRAL MALAR CHEEK
LOCATION DETAILED: NASAL DORSUM
LOCATION DETAILED: INFERIOR THORACIC SPINE
LOCATION DETAILED: STERNAL NOTCH
LOCATION DETAILED: LEFT INFERIOR MEDIAL MIDBACK
LOCATION DETAILED: LEFT ANTERIOR EARLOBE
LOCATION DETAILED: LEFT PROXIMAL DORSAL FOREARM
LOCATION DETAILED: RIGHT ANTERIOR EARLOBE
LOCATION DETAILED: LEFT DISTAL DORSAL FOREARM
LOCATION DETAILED: RIGHT DISTAL DORSAL FOREARM
LOCATION DETAILED: UPPER STERNUM
LOCATION DETAILED: LEFT INFERIOR CENTRAL MALAR CHEEK
LOCATION DETAILED: LEFT NASAL ALA
LOCATION DETAILED: LEFT MEDIAL UPPER BACK
LOCATION DETAILED: RIGHT PROXIMAL POSTERIOR UPPER ARM

## 2019-08-05 ASSESSMENT — LOCATION SIMPLE DESCRIPTION DERM
LOCATION SIMPLE: NOSE
LOCATION SIMPLE: LEFT LOWER BACK
LOCATION SIMPLE: RIGHT POSTERIOR UPPER ARM
LOCATION SIMPLE: RIGHT LOWER BACK
LOCATION SIMPLE: LEFT FOREARM
LOCATION SIMPLE: CHEST
LOCATION SIMPLE: LEFT CHEEK
LOCATION SIMPLE: UPPER BACK
LOCATION SIMPLE: LEFT EAR
LOCATION SIMPLE: RIGHT FOREARM
LOCATION SIMPLE: RIGHT CHEEK
LOCATION SIMPLE: LEFT UPPER BACK
LOCATION SIMPLE: LEFT POSTERIOR UPPER ARM
LOCATION SIMPLE: LEFT NOSE
LOCATION SIMPLE: RIGHT EAR

## 2019-08-05 ASSESSMENT — LOCATION ZONE DERM
LOCATION ZONE: FACE
LOCATION ZONE: ARM
LOCATION ZONE: NOSE
LOCATION ZONE: EAR
LOCATION ZONE: TRUNK

## 2019-08-05 NOTE — HPI: SKIN LESION
Is This A New Presentation, Or A Follow-Up?: Growth
What Type Of Note Output Would You Prefer (Optional)?: Standard Output
How Severe Is Your Skin Lesion?: mild
Has Your Skin Lesion Been Treated?: not been treated
Is This A New Presentation, Or A Follow-Up?: Skin Lesion

## 2019-08-05 NOTE — PROCEDURE: BIOPSY BY SHAVE METHOD
Bill For Surgical Tray: no
Notification Instructions: Patient will be notified of biopsy results. However, patient instructed to call the office if not contacted within 2 weeks.
Biopsy Type: H and E
X Size Of Lesion In Cm: 0
Consent: Written consent was obtained and risks were reviewed including but not limited to scarring, infection, bleeding, scabbing, incomplete removal, nerve damage and allergy to anesthesia.
Electrodesiccation Text: The wound bed was treated with electrodesiccation after the biopsy was performed.
Electrodesiccation And Curettage Text: The wound bed was treated with electrodesiccation and curettage after the biopsy was performed.
Type Of Destruction Used: Curettage
Dressing: Band-Aid
Biopsy Method: 15 blade
Was A Bandage Applied: Yes
Silver Nitrate Text: The wound bed was treated with silver nitrate after the biopsy was performed.
Hemostasis: Aluminum Chloride and Electrocautery
Curettage Text: The wound bed was treated with curettage after the biopsy was performed.
Detail Level: Detailed
Billing Type: Third-Party Bill
Post-Care Instructions: I reviewed with the patient in detail post-care instructions. Patient is to keep the biopsy site dry overnight, and then apply bacitracin twice daily until healed. Patient may apply hydrogen peroxide soaks to remove any crusting.
Size Of Lesion In Cm: 0.2
Lab: [id number]
Depth Of Biopsy: dermis
Wound Care: Aquaphor
Lab Facility: 
Cryotherapy Text: The wound bed was treated with cryotherapy after the biopsy was performed.
Anesthesia Type: 1% lidocaine with epinephrine
Size Of Lesion In Cm: 0.8

## 2019-08-06 ENCOUNTER — TELEPHONE (OUTPATIENT)
Dept: PULMONOLOGY | Facility: HOSPICE | Age: 74
End: 2019-08-06

## 2019-08-06 ENCOUNTER — SLEEP STUDY (OUTPATIENT)
Dept: SLEEP MEDICINE | Facility: MEDICAL CENTER | Age: 74
End: 2019-08-06
Attending: FAMILY MEDICINE
Payer: MEDICARE

## 2019-08-06 DIAGNOSIS — G47.31 COMPLEX SLEEP APNEA SYNDROME: ICD-10-CM

## 2019-08-06 DIAGNOSIS — G47.33 OSA (OBSTRUCTIVE SLEEP APNEA): ICD-10-CM

## 2019-08-06 PROCEDURE — 95811 POLYSOM 6/>YRS CPAP 4/> PARM: CPT | Performed by: INTERNAL MEDICINE

## 2019-08-06 RX ORDER — ZOLPIDEM TARTRATE 5 MG/1
5 TABLET ORAL NIGHTLY PRN
Qty: 2 TAB | Refills: 0 | Status: SHIPPED | OUTPATIENT
Start: 2019-08-06 | End: 2019-08-07

## 2019-08-06 RX ORDER — ZOLPIDEM TARTRATE 5 MG/1
5 TABLET ORAL NIGHTLY PRN
Qty: 2 TAB | Refills: 0 | Status: SHIPPED
Start: 2019-08-06 | End: 2019-08-07

## 2019-08-06 NOTE — TELEPHONE ENCOUNTER
Pt called request a script for Ambien be faxed to  [facility] for her Sleep Study tonight

## 2019-08-07 NOTE — PROCEDURES
Comments:  The patient underwent a diagnostic polysomnogram using the standard montage for measurement of parameters of sleep, respiratory events, movement abnormalities, and heart rate and rhythm.    A microphone was used to monitor snoring.    Analysis:  Study start time was 10:18:42 PM.  Diagnostic recording time was 5h 59.5m with a total sleep time of 2h 33.0m resulting in a sleep efficiency of 42.56%%.    Sleep latency from the start of the study was 04 minutes and the latency from sleep to REM was 136 minutes.  In total,54  arousals were scored for an arousal index of 21.2.  Respiratory:  There were a total of 40 apneas consisting of 0 obstructive apneas, 0 mixed apneas, and 40 central apneas.  A total of 6 hypopneas were scored.  The apnea index was 15.69 per hour and the hypopnea index was 2.35 per hour resulting in an overall AHI of 18.04.  AHI during rem was 0.0 and AHI while supine was 103.26.  Oximetry:  There was a mean oxygen saturation of 95.0% with a minimum oxygen saturation of 84.0%.  Time spent with oxygen saturations below 89% was 9.5 minutes.  Cardiac:  The highest heart rate seen while awake was 110 BPM while the highest heart rate during sleep was 80 BPM with an average sleeping heart rate of 62 BPM.  Limb Movements:  There were a total of 185 PLMs during sleep, of which 7 were PLMS arousals.  This resulted in a PLMS index of 72.5 and a PLMS arousal index of 2.7.      Interpretation:    Ms. Ordonez has a history of sleep apnea hypopnea syndrome.  Previous polysomnography demonstrated an apnea hypopnea index of 14.7 events per hour with a lowest arterial oxygen saturation of 83% on room air.  A titration polysomnogram on June 24, 2019 demonstrated no effective response to CPAP and BiPAP therapy.  There was a better response to servo adaptive BiPAP ventilation but an optimal level of treatment was not attained.  The study was designed to further titrate ASV therapy.    There is severe  fragmentation of sleep with a marked increase in wake after sleep onset time totaling more than 2.5 hours and an elevated arousal and awakening index.  17 minutes of REM sleep time are included.  There are frequent periodic limb movements and the periodic limb movement with arousal index is 5.1 events per hour.    Servo-adaptive BiPAP ventilation was applied with expiratory pressures of 5 to 7 cm water.  Hypopnea episodes were largely suppressed on the ASV.  Some central apnea episodes were seen early in the titration but resolved with an increase in pressure support and maximum pressure settings.  17 minutes of REM sleep time are included in the titration with only limited time in the supine body position.    Assessment:  This study demonstrates a good response to servo adaptive BiPAP ventilation in a patient with previously demonstrated sleep apnea hypopnea unresponsive to CPAP and BiPAP therapy.    Recommendations:  Respironics ASV with an expiratory pressure range of 7 to 15 cm water, pressure support at 5 to 20 cm of water and a maximum pressure of 25 cm water.  She did best with a small Dreamware full facemask.

## 2019-08-07 NOTE — PROCEDURES
Comments:  The patient underwent a CPAP titration using the standard montage for measurement of parameters of sleep, respiratory events, movement abnormalities, and heart rate and rhythm.    A microphone was used to monitor snoring.    Analysis:  Study start time was 09:50:50 PM.  Diagnostic recording time was 8h 23.0m with a total sleep time of 4h 12.0m resulting in a sleep efficiency of 50.10%%.    Sleep latency from the start of the study was 78 minutes and the latency from sleep to REM was 124 minutes.  In total,48  arousals were scored for an arousal index of 11.4.  Respiratory:  There were a total of 97 apneas consisting of 0 obstructive apneas, 0 mixed apneas, and 97 central apneas.  A total of 7 hypopneas were scored.  The apnea index was 23.10 per hour and the hypopnea index was 1.67 per hour resulting in an overall AHI of 24.76.  AHI during rem was 0.0 and AHI while supine was 0.00.  Oximetry:  There was a mean oxygen saturation of 94.0% with a minimum oxygen saturation of 84.0%.  Time spent with oxygen saturations below 89% was 18.8 minutes.  Cardiac:  The highest heart rate seen while awake was 89 BPM while the highest heart rate during sleep was 78 BPM with an average sleeping heart rate of 53 BPM.  Limb Movements:  There were a total of 217 PLMs during sleep, of which 2 were PLMS arousals.  This resulted in a PLMS index of 51.7 and a PLMS arousal index of 0.5.       Interpretation:    Ms. Ordonez has a history of sleep apnea hypopnea syndrome.  Previous polysomnography demonstrated an apnea hypopnea index of 14.7 events per hour with a lowest arterial oxygen saturation of 83% on room air.  She was unable to tolerate a dental device.  This study was designed to titrate treatment.    There is significant fragmentation of sleep with increased wake after sleep onset time totaling more than 2 hours and a prolonged sleep onset latency of 78 minutes.  21 minutes of REM sleep time are included.  There are some  periodic limb movements but they do not significantly affect sleep continuity.    CPAP was applied at 7-9 cm water pressure with the evolution of central apnea events.  BiPAP was applied at 10-14/6-9 cm water pressure, again with persistence of treatment emergent central apnea.  Overall there were 97 episodes of central apnea, no obstructive apnea and 7 episodes of hypopnea during this study.  Servo adaptive BiPAP ventilation was then applied with an expiratory pressure range of 6-14 cm water.  In that stage the apnea hypopnea index was 17.8 events per hour with a mean arterial oxygen saturation of 96% and a minimum saturation of 87% on room air.  That stage in the titration included 14 minutes of non-REM sleep, no REM sleep and only a brief time in the supine body position.    Assessment:  This study demonstrates treatment emergent central apnea in a patient with previously demonstrated sleep apnea hypopnea, suggesting complex sleep apnea.  There was no effective response to CPAP or BiPAP therapy.  ASV therapy was more effective but there were still some persistent hypopnea episodes on ASV with the fairly low expiratory pressure minimum used.  The study is limited by marked fragmentation of sleep in the latter stages of the titration and by the absence of supine or REM sleep time during the ASV trial.    Recommendations:  ASV with a higher minimum pressure.  An expiratory pressure range of 7-14 with pressure support at 2-11 cm water could be considered with a maximum pressure of 25 cm water and an automatic backup rate.  Further polysomnographic titration of ASV therapy might also be considered.  She did best with a small Simplus fullface mask.     99

## 2019-08-07 NOTE — TELEPHONE ENCOUNTER
"I see that Rx for ambien 5mg #2 tablets was authorized and sent to patient [facility] pharmacy on [address], however, they never received     RX called into the pharmacist \"[facility]\" at [facility] and given verbally. Patient aware request completed.  "

## 2019-08-26 ENCOUNTER — APPOINTMENT (OUTPATIENT)
Dept: RADIOLOGY | Facility: MEDICAL CENTER | Age: 74
End: 2019-08-26
Attending: INTERNAL MEDICINE
Payer: MEDICARE

## 2019-08-29 ENCOUNTER — HOSPITAL ENCOUNTER (OUTPATIENT)
Dept: RADIOLOGY | Facility: MEDICAL CENTER | Age: 74
End: 2019-08-29
Attending: INTERNAL MEDICINE
Payer: MEDICARE

## 2019-08-29 DIAGNOSIS — Z12.31 SCREENING MAMMOGRAM, ENCOUNTER FOR: ICD-10-CM

## 2019-08-29 PROCEDURE — 77063 BREAST TOMOSYNTHESIS BI: CPT

## 2019-08-30 ENCOUNTER — SLEEP CENTER VISIT (OUTPATIENT)
Dept: SLEEP MEDICINE | Facility: MEDICAL CENTER | Age: 74
End: 2019-08-30
Payer: MEDICARE

## 2019-08-30 VITALS
HEART RATE: 60 BPM | WEIGHT: 165 LBS | DIASTOLIC BLOOD PRESSURE: 74 MMHG | RESPIRATION RATE: 15 BRPM | SYSTOLIC BLOOD PRESSURE: 112 MMHG | OXYGEN SATURATION: 95 % | HEIGHT: 65 IN | BODY MASS INDEX: 27.49 KG/M2

## 2019-08-30 DIAGNOSIS — G47.33 OSA (OBSTRUCTIVE SLEEP APNEA): ICD-10-CM

## 2019-08-30 DIAGNOSIS — G47.31 COMPLEX SLEEP APNEA SYNDROME: ICD-10-CM

## 2019-08-30 PROCEDURE — 99213 OFFICE O/P EST LOW 20 MIN: CPT | Performed by: FAMILY MEDICINE

## 2019-08-30 NOTE — PROGRESS NOTES
[facility] Follow Up Note     Date: 8/30/2019 / Time: 9:23 AM    Patient ID:   Name:             Martha Ordonez   YOB: 1945  Age:                 74 y.o.  female   MRN:               [id number]      Thank you for requesting a sleep medicine consultation on Martha Ordonez at the sleep center. She presents today with the chief complaints of complex sleep apnea follow up.     HISTORY OF PRESENT ILLNESS:       Pt was initially on dental appliance which she could not tolerate due to TMJ pain and dental problems. She is currently not on any treatment. She goes to sleep around 11 pm and wakes up around 6:30 am. She is getting about 5 hrs of sleep on a good night. Overall, she doesnot finds her sleep refreshing. She does not take regular naps. The symptoms of VALERIE has continue.     Since her last visit she had ASv titration and the best tolerated pressure was ASV EPAP 7/15 cm PS 6/20 cm and the AHi improved to 4.6/hr and O2 kwadwo 86%.     SLEEP HISTORY   HST which showed   Moderate  obstructive sleep apnea with  Respiratory Event Index (BEBETO) of 19.7 per hour and worse in supine sleep with BEBETO at 32/hr. O2 Sat. kwadwo was 80% and mean O2 sat was 91% and baseline O2 at 95 %. O2 sat was below 90% for 31% of the flow evaluation time. Oxygen Desaturation (>=4%) Index was elevated at 21.3/hr.       PSG showed mild obstructive and central respiratory events noted. The overall AHI was 14.7  events per hour comprised of 29% hypopneas, 38% obstructive apneas and 33% central apneas. The events were associated with desaturations to a kwadwo of 83%.The sleep efficiency was very poor 23% with only 85 minutes total sleep time obtained.      REVIEW OF SYSTEMS:       Constitutional: Denies fevers, Denies weight changes  Eyes: Denies changes in vision, no eye pain  Ears/Nose/Throat/Mouth: Denies nasal congestion or sore throat   Cardiovascular: Denies chest pain or palpitations   Respiratory: Denies shortness  of breath , Denies cough  Gastrointestinal/Hepatic: Denies abdominal pain, nausea, vomiting, diarrhea, constipation or GI bleeding   Genitourinary: Deniesdysuria or frequency  Musculoskeletal/Rheum: Denies  joint pain and swelling   Skin/Breast: Denies rash,   Neurological: Denies headache, confusion, memory loss or focal weakness/parasthesias  Psychiatric: denies mood disorder   Sleep: + snoring and EDS    Comprehensive review of systems form is reviewed with the patient and is attached in the EMR.     PMH:  has a past medical history of Abnormal thyroid function test (9/10/2014), Allergic rhinitis, Anxiety, Asthma, Back pain, Basal cell carcinoma, Bronchitis, Cardiac arrhythmia, Depression, Elevated liver enzymes (9/10/2014), Family history of melanoma, GERD (gastroesophageal reflux disease), Glaucoma suspect of both eyes, Hyperlipidemia, Hypertension, Nasal drainage, OCD (obsessive compulsive disorder), Pancreatitis, Parathyroid disorder (HCC), PCOS (polycystic ovarian syndrome), S/P ERCP, and Transient global amnesia.  MEDS:   Current Outpatient Medications:   •  olmesartan (BENICAR) 5 MG tablet, Take 2 Tabs by mouth every day., Disp: 200 Tab, Rfl: 3  •  albuterol (VENTOLIN HFA) 108 (90 Base) MCG/ACT Aero Soln inhalation aerosol, Inhale 2 Puffs by mouth every 6 hours as needed., Disp: 18 Inhaler, Rfl: 3  •  raNITidine (ZANTAC) 150 MG Tab, TAKE 1 TAB BY MOUTH 2 TIMES A DAY., Disp: 180 Tab, Rfl: 3  •  albuterol (PROVENTIL) 2.5mg/3ml Nebu Soln solution for nebulization, 3 mL by Nebulization route every 6 hours as needed for Shortness of Breath., Disp: 30 Bullet, Rfl: 3  •  acetaminophen (TYLENOL) 500 MG Tab, Take 500 mg by mouth every 6 hours as needed., Disp: , Rfl:   •  Cholecalciferol (VITAMIN D) 2000 UNIT Tab, Take 4,000 Units by mouth., Disp: , Rfl:   •  aspirin EC (ECOTRIN) 81 MG Tablet Delayed Response, Take 81 mg by mouth., Disp: , Rfl:   •  cetirizine (ZYRTEC) 10 MG TABS, Take 10 mg by mouth 1 time daily  as needed., Disp: , Rfl:   •  ondansetron (ZOFRAN ODT) 4 MG TABLET DISPERSIBLE, Take 1 Tab by mouth every 6 hours as needed for Nausea. (Patient not taking: Reported on 8/30/2019), Disp: 10 Tab, Rfl: 0  •  fluconazole (DIFLUCAN) 150 MG tablet, Take 1 Tab by mouth every day. (Patient not taking: Reported on 8/30/2019), Disp: 1 Tab, Rfl: 0  •  Misc. Devices Misc, Please provide 1 nebulizer advised to use albuterol nebulizer., Disp: 1 Device, Rfl: 0  •  VOLTAREN 1 % Gel, APPLY TO AFFECTED AREA 2 TO 3 TIMES PER DAY AS NEEDED FOR PAIN, Disp: , Rfl: 0  ALLERGIES:   Allergies   Allergen Reactions   • Ace Inhibitors      Cough   • Ceclor [Cefaclor] Hives   • Ceftin Hives   • Cephalosporins Rash   • Ciprofloxacin Rash   • Kenalog Hives   • Lamisil [Terbinafine Hcl]    • Latex Rash   • Merthiolate [Thimerosal] Hives   • Monistat [Tioconazole] Itching   • Pcn [Penicillins] Hives   • Benzalkonium Chloride Rash   • Tetracyclines Rash     SURGHX:   Past Surgical History:   Procedure Laterality Date   • ABDOMINAL HYSTERECTOMY TOTAL      ovaries remain   • ADENOIDECTOMY     • CARDIAC CATH, RIGHT HEART      normal, EKG was Abnormal    • CHOLECYSTECTOMY     • DILATION AND CURETTAGE     • EYE SURGERY      b/l iridotomy   • HYSTERECTOMY LAPAROSCOPY     • LUMPECTOMY      benign   • TONSILLECTOMY     • US-NEEDLE CORE BX-BREAST PANEL       SOCHX:  reports that she has never smoked. She has never used smokeless tobacco. She reports that she does not drink alcohol or use drugs..  FH:   Family History   Problem Relation Age of Onset   • Hypertension Mother    • Heart Disease Father         CHF   • Cancer Father         melanoma   • Hypertension Sister    • Stroke Sister    • Sleep Apnea Sister    • Cancer Paternal Grandfather         colon   • Heart Disease Paternal Grandfather    • Stroke Paternal Grandfather    • Diabetes Paternal Grandfather    • Other Maternal Grandmother         eplepsey   • Heart Attack Maternal Grandfather    • Stroke  "Maternal Grandfather    • Stroke Paternal Grandmother    • Sleep Apnea Sister          Physical Exam:  Vitals/ General Appearance:   Weight/BMI: Body mass index is 27.46 kg/m².  /74 (BP Location: Left arm, Patient Position: Sitting, BP Cuff Size: Adult)   Pulse 60   Resp 15   Ht 1.651 m (5' 5\")   Wt 74.8 kg (165 lb)   SpO2 95%   Vitals:    08/30/19 0924   BP: 112/74   BP Location: Left arm   Patient Position: Sitting   BP Cuff Size: Adult   Pulse: 60   Resp: 15   SpO2: 95%   Weight: 74.8 kg (165 lb)   Height: 1.651 m (5' 5\")       Pt. is alert and oriented to time, place and person. Cooperative and in no apparent distress.       -Head: Atraumatic, normocephalic.   -. Nose: No inferior turbinate hypertophy  -. Throat: Oropharynx appears crowded in that the palate is overhanging, pharynx not inflamed.  -. Neck: Supple. No thyromegaly  -. Chest: Trachea central, no spine deformity   -. Lungs auscultation: B/L good air entry, vesicular breath sounds, no adventitious sounds  -. Heart auscultation: 1st and 2nd heart sounds normal, regular rhythm. No appreciable murmur.  - Extremities: no clubbing, no pedal edema.  - Skin: No rash  - NEUROLOGICAL EXAMINATION: On neurological exam, the patient was alert and oriented x3. speech was clear and fluent without dysarthria.      ASSESSMENT AND PLAN     1. Sleep Apnea      The pathophysiology of sleep anea and the increased risk of cardiovascular morbidity from untreated sleep apnea is discussed in detail with the patient.      She is urged to avoid supine sleep, weight gain and alcoholic beverages since all of these can worsen sleep apnea. She is cautioned against drowsy driving. If She feels sleepy while driving, She must pull over for a break/nap, rather than persist on the road, in the interest of She own safety and that of others on the road.   Plan   -  After informed discussion ASV EPAP 6/15 cm PS 2/15 cm with dreamwear FFM   - F/u in 8-10 weeks to assess the " efficiacy of recommended pressure    - compliance download was reviewed and discussed with the pt   - compliance was reinforced     2. Regarding treatment of other past medical problems and general health maintenance,  She is urged to follow up with PCP.

## 2019-09-03 ENCOUNTER — APPOINTMENT (RX ONLY)
Dept: URBAN - METROPOLITAN AREA CLINIC 4 | Facility: CLINIC | Age: 74
Setting detail: DERMATOLOGY
End: 2019-09-03

## 2019-09-03 ENCOUNTER — PATIENT MESSAGE (OUTPATIENT)
Dept: SLEEP MEDICINE | Facility: MEDICAL CENTER | Age: 74
End: 2019-09-03

## 2019-09-03 DIAGNOSIS — G47.31 CENTRAL SLEEP APNEA: ICD-10-CM

## 2019-09-03 DIAGNOSIS — Z71.89 OTHER SPECIFIED COUNSELING: ICD-10-CM

## 2019-09-03 DIAGNOSIS — L90.5 SCAR CONDITIONS AND FIBROSIS OF SKIN: ICD-10-CM

## 2019-09-03 DIAGNOSIS — L57.8 OTHER SKIN CHANGES DUE TO CHRONIC EXPOSURE TO NONIONIZING RADIATION: ICD-10-CM

## 2019-09-03 PROCEDURE — ? ADDITIONAL NOTES

## 2019-09-03 PROCEDURE — ? COUNSELING

## 2019-09-03 PROCEDURE — 99213 OFFICE O/P EST LOW 20 MIN: CPT

## 2019-09-03 ASSESSMENT — LOCATION DETAILED DESCRIPTION DERM
LOCATION DETAILED: LEFT MEDIAL UPPER BACK
LOCATION DETAILED: LEFT INFERIOR CENTRAL MALAR CHEEK

## 2019-09-03 ASSESSMENT — LOCATION SIMPLE DESCRIPTION DERM
LOCATION SIMPLE: LEFT CHEEK
LOCATION SIMPLE: LEFT UPPER BACK

## 2019-09-03 ASSESSMENT — LOCATION ZONE DERM
LOCATION ZONE: FACE
LOCATION ZONE: TRUNK

## 2019-09-03 NOTE — PROCEDURE: ADDITIONAL NOTES
Detail Level: Simple
Additional Notes: Reassure and observe for changes. \\nNo further treatment needed.
Additional Notes: Discussed Niacinamide supplements daily, 500mg twice daily.

## 2019-09-04 NOTE — TELEPHONE ENCOUNTER
From: Martha Ordonez  To: Harshad Mason M.D.  Sent: 9/3/2019 5:58 PM PDT  Subject: Non-Urgent Medical Question    After [facility] told me that [facility] does not offer ASVs, I did some more research about ASV and came across recommendations that patients be checked to make sure EF is greater than 45%. From age 34, I had Echos every few years because of MVP. About 10 years ago the Echo technology had improved so much that my cardiologist said my mitral valve was not actually prolapsing. I haven't had one since. My father had a leaking mitral valve and developed CHF when he was about 80. Should I have an echo to check EF to be on the safe side before beginning treatment.  BTW, I totally understand about the mistake and it's OK. [facility] was trying to get my order in before the weekend thinking it would help.  Thank you for all your help.  Martha Ordonez  ----- Message -----  From: Harshad Mason M.D.  Sent: 9/3/2019 8:46 AM PDT  To: Martha Ordonez  Subject: RE: Non-Urgent Medical Question  Sorry for the mistake and thank you for understanding that it was a mistake. Good luck with the treatment and will see you soon. Once you get the machine and you have any questions, feel free to reach out to me.    Thank you    Harshad Mason M.D.      ----- Message -----   From: Martha Ordonez   Sent: 7/11/2019 11:20 AM PDT   To: Harshad Mason M.D.  Subject: RE: Non-Urgent Medical Question    Dr. Mason,  My , Aubrey, went over to [facility] yesterday bec of his mask issue. They suggested an extra-large replacement-worked well last night.   I'll look forward to the appointment and starting with the treatment. With a good local supply provider and your help, I think now I'll have success.  Thank you for all of your patience with me. You are a wonderful doctor and your care is much appreciated.  Martha Ordonez  ----- Message -----  From: Harshad Mason M.D.  Sent: 7/11/2019 10:01 AM PDT  To: Martha Ordonez  Subject: RE:  Non-Urgent Medical Question  Good Morning,    I actually love [facility] so I am glad that you are choosing them. So when it's time to prescribe the ASV, I will definitely send them the order. As for another titration, yes medicare should cover it. When someone will call from our office to schedule, confirm with them as well. If not you can ask medicare as well. As for the co pays, it is hard to tell but medicare should be able to answer that.    Harshad Mason M.D.      ----- Message -----   From: Martha Ordonez   Sent: 7/10/2019 3:28 PM PDT   To: Harshad Mason M.D.  Subject: RE: Non-Urgent Medical Question    Dr. Mason,  Thank you for the information about CSA. I am short of breath a lot but do not think I have any CHF and don't have any neuro symptoms. I would prefer to return for the ASV titration. It would give me the best chance for success starting out.   When it is time to prescribe, I want to use [facility] in [place] because they have a local walk-in office with help. My  has had an extremely challenging time getting supplies on line from his provider-[facility] then bought by "[employer]. I realize I would pay [facility] up front and then they would file but knowing me, I will need local support from my supplier.   Will Medicare and pay for a titration study at this point?  Thank you!  Martha Ordonez  ----- Message -----  From: Harshad Mason M.D.  Sent: 7/10/2019 2:41 PM PDT  To: Martha Ordonez  Subject: RE: Non-Urgent Medical Question  Hi,  Hope you are felling better. 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 CSA can be seen in pt's who have heart disease and neurological disease as well. I dont think we need to do explore those. I see that you have hx of premature ventricular  beats but usually they don't cause CSA. The time spent on ASV was short and we did not achieve a good control however, I think rather waiting for another sleep study we can order ASV and it is a smart machine that can adjust pressure as needed. But if on compliance download on your follow up visit, the apneas are not well controlled, we can do the ASV titration at that time. All mask leak a bit, and it takes some time to adjust and learn new position to sleep with the mask. Your tech on that night was [employer].    Please feel free to contact me and let me know if you are ok with me ordering the ASV or you would like to do the titration.     Harshad Mason M.D.          ----- Message -----   From: Martha Ordonez   Sent: 7/10/2019 11:32 AM PDT   To: Harshad Mason M.D.  Subject: RE: Non-Urgent Medical Question    I was so sorry to miss my appointment as I wanted help to further understand the sleep study results and discuss options. 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 What is the cause of CSA? Could there be other causes that need exploration?  It looks like I did better in a supine position. I am a predominately side sleeper. Does that mean that the mask was leaking when I was on my sides? The tech offered me three different masks. The full face was the best but still not great.  One of the possible recommendations was a ASV titration study. Would that be a reasonable approach ? Would insurance cover it?   The sleep center experience was great this time due to the sleep tech. She was great.. wish I could recall her name.  Thank you.  ----- Message -----  From: Harshad Mason M.D.  Sent: 7/8/2019 12:20 PM PDT  To: Martha Ordonez  Subject: RE: Non-Urgent Medical Question  Hello,    I hope you get well soon. I reviewed your sleep study and the best tolerated pressure and machine was a form of BiPAP called ASV. If it is ok with you, I can order that machine so by the time you come in to see me on 8/30/19  you already have received the machine and have some time adjusting to it. If needed we will address the issues and go over the sleep study in detail.     Harshad Mason M.D.      ----- Message -----   From: Martha Ordonez   Sent: 7/8/2019 11:23 AM PDT   To: Harshad Mason M.D.  Subject: Non-Urgent Medical Question    Dr. Mason,  I've awakened this morning with hematuria and I am being seen at [facility] at 1:00 pm. I need to cancel and reschedule my appt at 12:40 with you today. I am sorry to cancel so late but think I really need to address this issue-think it's a bladder infection-haven't had one in years and years.   Martha Ordonez

## 2019-09-09 ENCOUNTER — HOSPITAL ENCOUNTER (OUTPATIENT)
Dept: CARDIOLOGY | Facility: MEDICAL CENTER | Age: 74
End: 2019-09-09
Attending: FAMILY MEDICINE
Payer: MEDICARE

## 2019-09-09 DIAGNOSIS — G47.31 CENTRAL SLEEP APNEA: ICD-10-CM

## 2019-09-09 LAB
LV EJECT FRACT  99904: 60
LV EJECT FRACT MOD 2C 99903: 60.7
LV EJECT FRACT MOD 4C 99902: 61.75
LV EJECT FRACT MOD BP 99901: 62.43

## 2019-09-09 PROCEDURE — 93306 TTE W/DOPPLER COMPLETE: CPT

## 2019-09-09 PROCEDURE — 93306 TTE W/DOPPLER COMPLETE: CPT | Mod: 26 | Performed by: INTERNAL MEDICINE

## 2019-10-02 ENCOUNTER — PATIENT MESSAGE (OUTPATIENT)
Dept: SLEEP MEDICINE | Facility: MEDICAL CENTER | Age: 74
End: 2019-10-02

## 2019-10-02 DIAGNOSIS — G47.31 CENTRAL SLEEP APNEA: ICD-10-CM

## 2019-10-03 ENCOUNTER — PATIENT MESSAGE (OUTPATIENT)
Dept: SLEEP MEDICINE | Facility: MEDICAL CENTER | Age: 74
End: 2019-10-03

## 2019-10-04 ENCOUNTER — PATIENT MESSAGE (OUTPATIENT)
Dept: SLEEP MEDICINE | Facility: MEDICAL CENTER | Age: 74
End: 2019-10-04

## 2019-10-04 DIAGNOSIS — G47.33 OSA (OBSTRUCTIVE SLEEP APNEA): ICD-10-CM

## 2019-10-04 NOTE — TELEPHONE ENCOUNTER
From: Martha Ordonez  To: Harshad Mason M.D.  Sent: 10/4/2019 10:56 AM PDT  Subject: Procedure Question    Dr. Mason,  I still had aerophagia last night. The pain wasn't as bad but still had belching and flatus. I wore it from MN to about 3:00 and never got to sleep although the mask fit well and was fairly comfortable. The machine follows my breaths and then gives a big puff but I am still breathing. I think this is when the air is getting into my stomach. What is causing the machine to give this puff when I am still breathing on my own?   Thank you.  Martha Ordonez  ----- Message -----  From: Harshad Mason M.D.  Sent: 10/4/2019 8:37 AM PDT  To: Martha Ordonez  Subject: RE: Procedure Question  I hope so too. If the 5 cm is still too high, let me know and I can order the pressure change to 4 cm.    Have a good weekend.    Harshad Mason M.D.      ----- Message -----   From: Martha Ordonez   Sent: 10/3/2019 3:59 PM PDT   To: Harshad Mason M.D.  Subject: Procedure Question    Dr. Mason,  The RT changed my ramp time to 45 min. (takes me a long time to get to sleep.) He changed the pressure to 5. He informed me that my machine pressure will go as low as 4.   I'm hoping these changes will be helpful.   Thank you!  Martha Ordonez  ----- Message -----  From: Harshad Mason M.D.  Sent: 10/3/2019 12:47 PM PDT  To: Martha Ordonez  Subject: RE: Procedure Question  Hi,   Sorry to hear that you are having hard time adjusting to the ASV. It is an automated machine and it does change pressure based on the need and apneas. If the pressure is increasing to soon, you can ask the respiratory therapist to increase the ramp time (time it takes to get to the higher pressure). I will be sending the order to decrease the pressure. The base pressure will be 5 cm and that is the lowest that the machine can go. I tried attaching the order to this message but it doesn't seem like it can be attached. However, if you stop by we can print out  the order for you.    Please feel free to contact with any questions or concerns regarding the therapy.    Thank you    Harshad Mason M.D.      ----- Message -----   From: Martha Ordonez   Sent: 10/2/2019 3:51 PM PDT   To: Harshad Mason M.D.  Subject: Procedure Question    Dr. Mason,   Please see previous message before reading. If you make changes to my pressures, please also write a hard copy order so I can pick it up in case the supplying company does not get the info to the Respiratory Therapist assisting me. He only works part-time. I did end up with [facility] since [facility] does not carry ASV. The Respiratory Therapist is very good and has been accessible.  Thank you! I'm glad you're on this journey with me. Anyone else would have already given up on me by now!  Martha Ordonez

## 2019-10-19 ENCOUNTER — PATIENT MESSAGE (OUTPATIENT)
Dept: SLEEP MEDICINE | Facility: MEDICAL CENTER | Age: 74
End: 2019-10-19

## 2019-10-19 DIAGNOSIS — G47.33 OSA (OBSTRUCTIVE SLEEP APNEA): ICD-10-CM

## 2019-10-29 ENCOUNTER — OFFICE VISIT (OUTPATIENT)
Dept: PHYSICAL MEDICINE AND REHAB | Facility: MEDICAL CENTER | Age: 74
End: 2019-10-29
Payer: MEDICARE

## 2019-10-29 VITALS
HEART RATE: 74 BPM | TEMPERATURE: 97 F | HEIGHT: 65 IN | BODY MASS INDEX: 27.99 KG/M2 | SYSTOLIC BLOOD PRESSURE: 118 MMHG | OXYGEN SATURATION: 93 % | WEIGHT: 167.99 LBS | DIASTOLIC BLOOD PRESSURE: 84 MMHG

## 2019-10-29 DIAGNOSIS — M51.36 DDD (DEGENERATIVE DISC DISEASE), LUMBAR: ICD-10-CM

## 2019-10-29 DIAGNOSIS — M54.50 LUMBOSACRAL PAIN: ICD-10-CM

## 2019-10-29 DIAGNOSIS — M53.3 SACROILIAC JOINT DYSFUNCTION: ICD-10-CM

## 2019-10-29 DIAGNOSIS — M41.80 DEXTROSCOLIOSIS: ICD-10-CM

## 2019-10-29 PROCEDURE — 99213 OFFICE O/P EST LOW 20 MIN: CPT | Performed by: PHYSICAL MEDICINE & REHABILITATION

## 2019-10-29 RX ORDER — AZELASTINE HYDROCHLORIDE 0.5 MG/ML
SOLUTION/ DROPS OPHTHALMIC
Refills: 0 | COMMUNITY
Start: 2019-08-20 | End: 2020-05-23

## 2019-10-29 ASSESSMENT — PATIENT HEALTH QUESTIONNAIRE - PHQ9: CLINICAL INTERPRETATION OF PHQ2 SCORE: 0

## 2019-10-29 ASSESSMENT — PAIN SCALES - GENERAL: PAINLEVEL: 2=MINIMAL-SLIGHT

## 2019-11-21 ENCOUNTER — HOSPITAL ENCOUNTER (OUTPATIENT)
Dept: LAB | Facility: MEDICAL CENTER | Age: 74
End: 2019-11-21
Attending: INTERNAL MEDICINE
Payer: MEDICARE

## 2019-11-21 ENCOUNTER — HOSPITAL ENCOUNTER (OUTPATIENT)
Dept: RADIOLOGY | Facility: MEDICAL CENTER | Age: 74
End: 2019-11-21
Attending: INTERNAL MEDICINE
Payer: MEDICARE

## 2019-11-21 DIAGNOSIS — E78.00 PURE HYPERCHOLESTEROLEMIA: ICD-10-CM

## 2019-11-21 DIAGNOSIS — R79.89 INCREASED PTH LEVEL: ICD-10-CM

## 2019-11-21 DIAGNOSIS — E55.9 VITAMIN D INSUFFICIENCY: ICD-10-CM

## 2019-11-21 DIAGNOSIS — Z78.0 POSTMENOPAUSAL ESTROGEN DEFICIENCY: ICD-10-CM

## 2019-11-21 DIAGNOSIS — I10 ESSENTIAL HYPERTENSION: ICD-10-CM

## 2019-11-21 LAB
25(OH)D3 SERPL-MCNC: 49 NG/ML (ref 30–100)
ALBUMIN SERPL BCP-MCNC: 4 G/DL (ref 3.2–4.9)
ALBUMIN/GLOB SERPL: 1.5 G/DL
ALP SERPL-CCNC: 101 U/L (ref 30–99)
ALT SERPL-CCNC: 16 U/L (ref 2–50)
ANION GAP SERPL CALC-SCNC: 8 MMOL/L (ref 0–11.9)
AST SERPL-CCNC: 20 U/L (ref 12–45)
BASOPHILS # BLD AUTO: 1.3 % (ref 0–1.8)
BASOPHILS # BLD: 0.06 K/UL (ref 0–0.12)
BILIRUB SERPL-MCNC: 0.8 MG/DL (ref 0.1–1.5)
BUN SERPL-MCNC: 16 MG/DL (ref 8–22)
CALCIUM SERPL-MCNC: 9 MG/DL (ref 8.5–10.5)
CHLORIDE SERPL-SCNC: 106 MMOL/L (ref 96–112)
CHOLEST SERPL-MCNC: 179 MG/DL (ref 100–199)
CO2 SERPL-SCNC: 29 MMOL/L (ref 20–33)
CREAT SERPL-MCNC: 0.8 MG/DL (ref 0.5–1.4)
EOSINOPHIL # BLD AUTO: 0.18 K/UL (ref 0–0.51)
EOSINOPHIL NFR BLD: 3.8 % (ref 0–6.9)
ERYTHROCYTE [DISTWIDTH] IN BLOOD BY AUTOMATED COUNT: 40.9 FL (ref 35.9–50)
FASTING STATUS PATIENT QL REPORTED: NORMAL
GLOBULIN SER CALC-MCNC: 2.7 G/DL (ref 1.9–3.5)
GLUCOSE SERPL-MCNC: 83 MG/DL (ref 65–99)
HCT VFR BLD AUTO: 44.3 % (ref 37–47)
HDLC SERPL-MCNC: 68 MG/DL
HGB BLD-MCNC: 14.5 G/DL (ref 12–16)
IMM GRANULOCYTES # BLD AUTO: 0.01 K/UL (ref 0–0.11)
IMM GRANULOCYTES NFR BLD AUTO: 0.2 % (ref 0–0.9)
LDLC SERPL CALC-MCNC: 94 MG/DL
LYMPHOCYTES # BLD AUTO: 2.11 K/UL (ref 1–4.8)
LYMPHOCYTES NFR BLD: 44.1 % (ref 22–41)
MCH RBC QN AUTO: 31.6 PG (ref 27–33)
MCHC RBC AUTO-ENTMCNC: 32.7 G/DL (ref 33.6–35)
MCV RBC AUTO: 96.5 FL (ref 81.4–97.8)
MONOCYTES # BLD AUTO: 0.32 K/UL (ref 0–0.85)
MONOCYTES NFR BLD AUTO: 6.7 % (ref 0–13.4)
NEUTROPHILS # BLD AUTO: 2.1 K/UL (ref 2–7.15)
NEUTROPHILS NFR BLD: 43.9 % (ref 44–72)
NRBC # BLD AUTO: 0 K/UL
NRBC BLD-RTO: 0 /100 WBC
PLATELET # BLD AUTO: 212 K/UL (ref 164–446)
PMV BLD AUTO: 10.1 FL (ref 9–12.9)
POTASSIUM SERPL-SCNC: 4.4 MMOL/L (ref 3.6–5.5)
PROT SERPL-MCNC: 6.7 G/DL (ref 6–8.2)
PTH-INTACT SERPL-MCNC: 70.5 PG/ML (ref 14–72)
RBC # BLD AUTO: 4.59 M/UL (ref 4.2–5.4)
SODIUM SERPL-SCNC: 143 MMOL/L (ref 135–145)
TRIGL SERPL-MCNC: 87 MG/DL (ref 0–149)
WBC # BLD AUTO: 4.8 K/UL (ref 4.8–10.8)

## 2019-11-21 PROCEDURE — 77080 DXA BONE DENSITY AXIAL: CPT

## 2019-11-21 PROCEDURE — 36415 COLL VENOUS BLD VENIPUNCTURE: CPT

## 2019-11-21 PROCEDURE — 83970 ASSAY OF PARATHORMONE: CPT

## 2019-11-21 PROCEDURE — 82306 VITAMIN D 25 HYDROXY: CPT

## 2019-11-21 PROCEDURE — 80053 COMPREHEN METABOLIC PANEL: CPT

## 2019-11-21 PROCEDURE — 80061 LIPID PANEL: CPT

## 2019-11-21 PROCEDURE — 85025 COMPLETE CBC W/AUTO DIFF WBC: CPT

## 2019-12-04 ENCOUNTER — OFFICE VISIT (OUTPATIENT)
Dept: MEDICAL GROUP | Facility: IMAGING CENTER | Age: 74
End: 2019-12-04
Payer: MEDICARE

## 2019-12-04 ENCOUNTER — PATIENT MESSAGE (OUTPATIENT)
Dept: SLEEP MEDICINE | Facility: MEDICAL CENTER | Age: 74
End: 2019-12-04

## 2019-12-04 VITALS
HEIGHT: 65 IN | TEMPERATURE: 98.4 F | WEIGHT: 169 LBS | BODY MASS INDEX: 28.16 KG/M2 | RESPIRATION RATE: 19 BRPM | HEART RATE: 60 BPM | DIASTOLIC BLOOD PRESSURE: 74 MMHG | SYSTOLIC BLOOD PRESSURE: 122 MMHG | OXYGEN SATURATION: 97 %

## 2019-12-04 DIAGNOSIS — M85.89 OSTEOPENIA OF MULTIPLE SITES: ICD-10-CM

## 2019-12-04 DIAGNOSIS — J45.20 MILD INTERMITTENT ASTHMA WITHOUT COMPLICATION: ICD-10-CM

## 2019-12-04 DIAGNOSIS — K21.9 GASTROESOPHAGEAL REFLUX DISEASE WITHOUT ESOPHAGITIS: ICD-10-CM

## 2019-12-04 DIAGNOSIS — I49.3 PVCS (PREMATURE VENTRICULAR CONTRACTIONS): ICD-10-CM

## 2019-12-04 DIAGNOSIS — G47.33 OSA (OBSTRUCTIVE SLEEP APNEA): ICD-10-CM

## 2019-12-04 DIAGNOSIS — E55.9 VITAMIN D INSUFFICIENCY: ICD-10-CM

## 2019-12-04 DIAGNOSIS — I10 ESSENTIAL HYPERTENSION: ICD-10-CM

## 2019-12-04 DIAGNOSIS — R79.89 INCREASED PTH LEVEL: ICD-10-CM

## 2019-12-04 DIAGNOSIS — I49.1 PAC (PREMATURE ATRIAL CONTRACTION): ICD-10-CM

## 2019-12-04 DIAGNOSIS — E78.00 PURE HYPERCHOLESTEROLEMIA: ICD-10-CM

## 2019-12-04 DIAGNOSIS — G43.009 MIGRAINE WITHOUT AURA AND WITHOUT STATUS MIGRAINOSUS, NOT INTRACTABLE: ICD-10-CM

## 2019-12-04 PROCEDURE — 99215 OFFICE O/P EST HI 40 MIN: CPT | Performed by: FAMILY MEDICINE

## 2019-12-04 RX ORDER — FAMOTIDINE 20 MG/1
20 TABLET, FILM COATED ORAL 2 TIMES DAILY
Qty: 60 TAB | Refills: 3 | Status: SHIPPED | OUTPATIENT
Start: 2019-12-04 | End: 2019-12-12

## 2019-12-04 RX ORDER — OMEPRAZOLE 20 MG/1
20 CAPSULE, DELAYED RELEASE ORAL DAILY
COMMUNITY
End: 2019-12-12

## 2019-12-04 RX ORDER — OMEPRAZOLE 20 MG/1
20 CAPSULE, DELAYED RELEASE ORAL DAILY
Status: CANCELLED | OUTPATIENT
Start: 2019-12-04

## 2019-12-04 NOTE — PROGRESS NOTES
Chief Complaint   Patient presents with   • Establish Care   • Heartburn     omprazole 20 mg. could take 10 mg?       HPI:  74 y.o. female new patient here to review medical issues and re-establish care.   Recently with Dr. Ruchi Plasencia.     Hypertension and has been noted to have PVCs and PACs.   She previously stopped losartan in past due to medication recall and started on olmesartan 5 mg daily.  Blood pressure has been stable and has tolerated well.  States she is now off aspirin therapy after reading an article regarding new asa recommendations.    Hypercholesterolemia- did not tolerate medication therapy in the past.  Found it caused some memory issues.  Recent labs are within acceptable limits.  She was eating more vegetables until her grandchildren came over.  States she did [place] in the past and did not find any changes in her cholesterol.    GERD- on PPI- omeprazole 20 daily.  She does try to modify her diet and elevate her bed at nighttime.  Stopped ranitidine due to recent findings/recall and restarted omeprazole 20 mg daily.  Tums was inadequate.     VALERIE diagnosed after sleep study not too long ago.  Has trouble sleeping in general.   Suggested oral appliance, had cracked tooth and crown while trying to get that completed.   Then had TMJ issues while on the oral appliance thus discontinued it.   Then had CPAP, but could not tolerate it.  Saw Dr. Ang. Had sleep study.   Central sleep apnea, tried ASV but was unable to tolerate and felt had no support from DME company.   Thus not treatment at this time, wakes up 2-3 times a night.  Palpitations/irregular- intermittent issues, hx of PACs and PVCs.   Had echocardiogram done. Sister had A fib.    Did not feel secure during sleep study.   Was on home oxygen therapy.   Finds that some nights she wakes and other nights she does not.  Night she wakes she may be sleeping on her side.  Has not journaled this before.    Migraine- no issues with  headaches at this time.    Asthma- no issues currently. Has albuterol as needed.     Vitamin D insufficiency- on supplement.     PTH- not following in [place], [place] at this time. Wanted to recheck labs in 6 months.  No surgery would be done unless she had an elevated calcium.  She was recommended to have calcium, alkaline phosphatase and PTH checked every 6 months currently. Was planning to have PCP check.   Vitamin D every day 4000 IU.  Bone density every 2 years recommended.   Walks, bicycling. No weights.   2 glasses of milk a day.  Feels she gets adequate calcium intake.  No parathyroid lesion on past ultrasound.   Saw Endocrinology.   Will go [place] for surgery if needed. Has researched this.     Osteopenia -had recent DEXA completed which shows minimal change.    Years ago took fosamax 2 years, also was on evista for a while.       Health Maintenance  Last pap: none further needed  Immunizations: Td/Tdap 2010;  Influenza vaccine 8/2019; Zostavax 2011; Shingrix 2019- completed 2 doses;  PCV 13- 2015; PPSV 23- 2013  Mammogram: 8/2019  Colonoscopy: 9/2018  Dexa Scan: 11/2019    Lifestyle:  Diet: last year vegetables/fish.  Regular meats/chicken.   Supplements: as listed  ASA: Recently discontinued.  Exercise: walking, add weight recommended.  Social Support: Lives with .  Work: Retired RN      Review of Systems:   Constitutional: Negative for fever, chills and malaise/fatigue.   HENT: Negative for congestion, sore throat, or swallowing issues.   Eyes: Negative for pain or vision changes.   Respiratory: Negative for cough and shortness of breath.    Cardiovascular: Negative for leg swelling. No chest pain.   Gastrointestinal: Negative for nausea, vomiting, abdominal pain and diarrhea.   Genitourinary: Negative for dysuria and hematuria.   Skin: Negative for rash.   Neurological: Negative for dizziness, focal weakness and headaches.   Endo/Heme/Allergies: Does not bruise/bleed easily.   Psychiatric/Behavioral:  Negative for depression.  The patient is not nervous/anxious.          Current Outpatient Medications:   •  Influenza Vac Split Quad (AFLURIA QUADRIVALENT IM), TO BE ADMINISTERED BY PHARMACIST FOR IMMUNIZATION, Disp: , Rfl: 0  •  olmesartan (BENICAR) 5 MG tablet, Take 2 Tabs by mouth every day., Disp: 200 Tab, Rfl: 3  •  albuterol (PROVENTIL) 2.5mg/3ml Nebu Soln solution for nebulization, 3 mL by Nebulization route every 6 hours as needed for Shortness of Breath., Disp: 30 Bullet, Rfl: 3  •  Misc. Devices Misc, Please provide 1 nebulizer advised to use albuterol nebulizer., Disp: 1 Device, Rfl: 0  •  acetaminophen (TYLENOL) 500 MG Tab, Take 500 mg by mouth every 6 hours as needed., Disp: , Rfl:   •  Cholecalciferol (VITAMIN D) 2000 UNIT Tab, Take 4,000 Units by mouth., Disp: , Rfl:   •  omeprazole (PRILOSEC) 10 MG CAPSULE DELAYED RELEASE, Take 1 Cap by mouth 2 times a day., Disp: 60 Cap, Rfl: 3  •  azelastine (OPTIVAR) 0.05 % ophthalmic solution, 1 DROP IN LEFT EYE 2X A DAY X7 DAYS REMOVE CONTACTS USE DROPS USE AGAIN IN 10MIN IF EYE IS STILL RED, Disp: , Rfl: 0  •  VOLTAREN 1 % Gel, APPLY TO AFFECTED AREA 2 TO 3 TIMES PER DAY AS NEEDED FOR PAIN, Disp: , Rfl: 0  •  aspirin EC (ECOTRIN) 81 MG Tablet Delayed Response, Take 81 mg by mouth., Disp: , Rfl:   •  cetirizine (ZYRTEC) 10 MG TABS, Take 10 mg by mouth 1 time daily as needed., Disp: , Rfl:     Allergies   Allergen Reactions   • Ace Inhibitors      Cough   • Ceclor [Cefaclor] Hives   • Ceftin Hives   • Cephalosporins Rash   • Ciprofloxacin Rash   • Kenalog Hives   • Lamisil [Terbinafine Hcl]    • Latex Rash   • Merthiolate [Thimerosal] Hives   • Monistat [Tioconazole] Itching   • Pcn [Penicillins] Hives   • Benzalkonium Chloride Rash   • Tetracyclines Rash       Patient Active Problem List   Diagnosis   • Mild asthma   • Pure hypercholesterolemia   • Essential hypertension   • Anxiety   • Transient global amnesia   • Gastroesophageal reflux disease without  esophagitis   • Fatigue   • Family history of melanoma   • OCD (obsessive compulsive disorder)   • Environmental allergies   • Increased PTH level   • Vitamin D insufficiency   • Elevated alkaline phosphatase level   • Nocturnal oxygen desaturation   • PVCs (premature ventricular contractions)   • PAC (premature atrial contraction)   • VALERIE (obstructive sleep apnea)   • Migraine without aura and without status migrainosus, not intractable   • Chronic right-sided low back pain with right-sided sciatica   • Pelvic pain   • Incomplete emptying of bladder       Past Medical History:   Diagnosis Date   • Abnormal thyroid function test 9/10/2014   • Allergic rhinitis    • Anxiety    • Asthma    • Back pain    • Basal cell carcinoma     squamos cell right ear   • Bronchitis    • Cardiac arrhythmia    • Depression    • Elevated liver enzymes 9/10/2014   • Family history of melanoma    • GERD (gastroesophageal reflux disease)    • Glaucoma suspect of both eyes    • Hyperlipidemia    • Hypertension    • Nasal drainage    • OCD (obsessive compulsive disorder)    • Pancreatitis    • Parathyroid disorder (HCC)    • PCOS (polycystic ovarian syndrome)    • S/P ERCP    • Transient global amnesia        Past Surgical History:   Procedure Laterality Date   • ABDOMINAL HYSTERECTOMY TOTAL      ovaries remain   • ADENOIDECTOMY     • CARDIAC CATH, RIGHT HEART      normal, EKG was Abnormal    • CHOLECYSTECTOMY     • DILATION AND CURETTAGE     • EYE SURGERY      b/l iridotomy   • HYSTERECTOMY LAPAROSCOPY     • LUMPECTOMY      benign   • TONSILLECTOMY     • US-NEEDLE CORE BX-BREAST PANEL         Family History   Problem Relation Age of Onset   • Hypertension Mother    • Heart Disease Father         CHF   • Cancer Father         melanoma   • Hypertension Sister    • Stroke Sister    • Sleep Apnea Sister    • Cancer Paternal Grandfather         colon   • Heart Disease Paternal Grandfather    • Stroke Paternal Grandfather    • Diabetes Paternal  Grandfather    • Other Maternal Grandmother         eplepsey   • Heart Attack Maternal Grandfather    • Stroke Maternal Grandfather    • Stroke Paternal Grandmother    • Sleep Apnea Sister        Social History     Socioeconomic History   • Marital status:      Spouse name: Not on file   • Number of children: Not on file   • Years of education: Not on file   • Highest education level: Not on file   Occupational History   • Occupation: RN- retired   Social Needs   • Financial resource strain: Not on file   • Food insecurity:     Worry: Not on file     Inability: Not on file   • Transportation needs:     Medical: Not on file     Non-medical: Not on file   Tobacco Use   • Smoking status: Never Smoker   • Smokeless tobacco: Never Used   Substance and Sexual Activity   • Alcohol use: No     Alcohol/week: 0.0 oz   • Drug use: No   • Sexual activity: Yes     Partners: Male     Comment: , retired RN   Lifestyle   • Physical activity:     Days per week: Not on file     Minutes per session: Not on file   • Stress: Not on file   Relationships   • Social connections:     Talks on phone: Not on file     Gets together: Not on file     Attends Buddhist service: Not on file     Active member of club or organization: Not on file     Attends meetings of clubs or organizations: Not on file     Relationship status: Not on file   • Intimate partner violence:     Fear of current or ex partner: Not on file     Emotionally abused: Not on file     Physically abused: Not on file     Forced sexual activity: Not on file   Other Topics Concern   •  Service No   • Blood Transfusions No   • Caffeine Concern No   • Occupational Exposure No   • Hobby Hazards No   • Sleep Concern Yes   • Stress Concern No   • Weight Concern Yes   • Special Diet Yes   • Back Care No   • Exercise Yes   • Bike Helmet Yes   • Seat Belt Yes   • Self-Exams Yes   Social History Narrative    , 2 children- daughters.          PHYSICAL EXAM:  BP  "122/74 (BP Location: Left arm, Patient Position: Sitting, BP Cuff Size: Adult)   Pulse 60   Temp 36.9 °C (98.4 °F) (Temporal)   Resp 19   Ht 1.651 m (5' 5\")   Wt 76.7 kg (169 lb)   LMP  (LMP Unknown)   SpO2 97%   BMI 28.12 kg/m²   Constitutional: She appears well-developed and well-nourished. She appears not diaphoretic. No distress.   HENT: Right Ear: External ear normal. Left Ear: External ear normal. Tympanic membranes clear and intact.   Nose: Nose normal.   Mouth/Throat: Oropharynx is clear and moist. No oropharyngeal exudate.     Eyes: Conjunctivae and extraocular motions are normal. Pupils are equal, round, and reactive to light. No scleral icterus.   Neck: Normal range of motion. Neck supple. No thyromegaly present.   Cardiovascular: Normal rate, regular rhythm, normal heart sounds and intact distal pulses.  Exam reveals no gallop and no friction rub.  No murmur heard. No carotid bruits.   Pulmonary/Chest: Effort normal and breath sounds normal. No respiratory distress. She has no wheezes. She has no rales.   Abdominal: Soft. Bowel sounds are normal. She exhibits no distension and no mass. No tenderness. She has no rebound and no guarding.   Lymphadenopathy:  She has no cervical adenopathy.   Neurological: She is alert. She has normal reflexes. No cranial nerve deficit. She exhibits normal muscle tone.   Skin: Skin is warm and dry. No rash noted. She is not diaphoretic. No erythema.   Psychiatric: She has a normal mood and affect. Her behavior is normal.   Musculoskeletal: She exhibits no edema. Full strength throughout. 2+ DTR throughout.     Details     Reading Physician Reading Date Result Priority   Rajan Fatima M.D. 11/21/2019       Narrative & Impression        11/21/2019 1:42 PM     HISTORY/REASON FOR EXAM:  Postmenopausal, hysterectomy, osteopenia, scoliosis, family history of osteoporosis in parent.     TECHNIQUE/EXAM DESCRIPTION AND NUMBER OF VIEWS:  Dual x-ray bone densitometry was " performed from the L1 through L4 levels and from the proximal left femur utilizing the GE Prodigy unit.     COMPARISON:   Bone densitometry scan 11/16/2017.     FINDINGS:  The mean bone mineral density for the lumbar spine is 0.947 g/cm2, which corresponds to a T score of -1.9 and a Z score of -0.2.     The proximal left femur has a mean bone mineral density of 0.851 g/cm2, with a T score of -1.2 and a Z score of 0.4.     When compared with the most recent study dated 11/16/2017, there has been a 2.2% decrease in the bone mineral density of the lumbar spine and a 2.3% decrease in the bone mineral density of the left femur.     IMPRESSION:     According to the World Health Organization classification, bone mineral density of this patient is osteopenic for the lumbar spine and osteopenic for the left femur. Decrease in bone density in the lumbar spine since the most recent exam is not   statistically significant. Decrease in bone density in the left femur since the most recent exam is not statistically significant.           10-year Probability of Fracture:  Major Osteoporotic     9.5%  Hip     1.3%  Population      [place] ()     Based on left femur neck BMD        Echocardiography Laboratory 9/2019     CONCLUSIONS  No prior study is available for comparison.   Normal left ventricular chamber size.  Left ventricular ejection fraction is visually estimated to be 60%.  Mild concentric left ventricular hypertrophy.  Mild tricuspid regurgitation.  Estimated right ventricular systolic pressure  is 30 mmHg.      ASSESSMENT/PLAN:    This is a 74 y.o. female with hypertension, hypercholesterolemia, GERD, VALERIE, migraine, asthma, osteopenia, vitamin D insufficiency and elevated PTH     1. Essential hypertension -controlled, continue current medication. Recommend heart healthy diet and routine exercise.     2. PAC (premature atrial contraction)- stable, intermittent.  Consider further repeat evaluation if any changes.      3. PVCs (premature ventricular contractions) -stable, intermittent. Consider further repeat evaluation if any further changes.     4. Pure hypercholesterolemia- not on medication therapy, did not tolerate statin in past. Stable on recent labs.   -Recommend heart healthy diet and routine exercise.  Continue to monitor.     5. Gastroesophageal reflux disease without esophagitis-stable on omeprazole therapy.   -Discussed recommendations for further dietary changes. Monitor, consider omeprazole 10 mg dose in future. Monitor.     6. VALERIE (obstructive sleep apnea) - questionable central sleep apnea. Did not tolerated prior therapies.   -Discussed patient options at this time. Recommend follow-up with sleep studies.  Consider further evaluation or other options at outside facility.    7. Migraine without aura and without status migrainosus, not intractable- stable. Monitor. Follow up as needed.     8. Mild intermittent asthma without complication - stable, albuterol as needed.     9. Vitamin D insufficiency - continue current supplement therapy. Monitor.     10. Increased PTH level- has been followed in , [place] previously.   -Will plan to monitor routine labs every 6 months as recommended. Patient will plan to follow up as needed otherwise.  PTH INTACT [place]    ALKALINE PHOSPHATASE    VITAMIN D,25 HYDROXY   11. Osteopenia of multiple sites      11/2019 dexa- has been on prior medication therapy.   -Recommend routine weight bearing exercise, adequate calcium and vitamin D intake. Discussed adequate nutrition vs supplement intake. Repeat in 2 years.        Return in about 3 months (around 3/4/2020).      This medical record contains text that has been entered with the assistance of computer voice recognition and dictation software.  Therefore, it may contain unintended errors in text, spelling, punctuation, or grammar.    > 40 minutes face to face time spent with this patient of which > 30  minutes spent on counseling and  coordination of care as above, excluding any time for procedures.

## 2019-12-12 ENCOUNTER — SLEEP CENTER VISIT (OUTPATIENT)
Dept: SLEEP MEDICINE | Facility: MEDICAL CENTER | Age: 74
End: 2019-12-12
Payer: MEDICARE

## 2019-12-12 VITALS
OXYGEN SATURATION: 96 % | RESPIRATION RATE: 15 BRPM | SYSTOLIC BLOOD PRESSURE: 122 MMHG | HEART RATE: 58 BPM | WEIGHT: 170 LBS | BODY MASS INDEX: 28.32 KG/M2 | DIASTOLIC BLOOD PRESSURE: 72 MMHG | HEIGHT: 65 IN

## 2019-12-12 DIAGNOSIS — G47.33 OSA (OBSTRUCTIVE SLEEP APNEA): ICD-10-CM

## 2019-12-12 PROCEDURE — 99213 OFFICE O/P EST LOW 20 MIN: CPT | Performed by: FAMILY MEDICINE

## 2019-12-12 RX ORDER — OMEPRAZOLE 10 MG/1
10 CAPSULE, DELAYED RELEASE ORAL 2 TIMES DAILY
Qty: 60 CAP | Refills: 3 | Status: SHIPPED | OUTPATIENT
Start: 2019-12-12 | End: 2020-04-19 | Stop reason: SDUPTHER

## 2019-12-12 NOTE — PROGRESS NOTES
[facility] Follow Up Note     Date: 12/12/2019 / Time: 8:12 AM    Patient ID:   Name:             Martha Ordonez   YOB: 1945  Age:                 74 y.o.  female   MRN:               [id number]      Thank you for requesting a sleep medicine consultation on Martha Ordonez at the sleep center. She presents today with the chief complaints of sleep apnea follow up.     HISTORY OF PRESENT ILLNESS:       Pt she was  Suppose to be on  ASV however she was unable to tolerate due to pressure and mask intolerance. She has tried dental appliance in the past however she could not tolerate due to TMJ pain and dental problems. She goes to sleep around 11:30 pm and wakes up around 8 am. She is getting about 7-8 hrs of sleep on a good night. The bad nights are rare per week. Overall, she does finds her sleep refreshing. The symptoms snoring has improved with sleeping on a adjustable bed.     SLEEP HISTORY   ASv titration: the best tolerated pressure was ASV EPAP 7/15 cm PS 6/20 cm and the AHi improved to 4.6/hr and O2 kwadwo 86%.      HST which showed   Moderate  obstructive sleep apnea with  Respiratory Event Index (BEBETO) of 19.7 per hour and worse in supine sleep with BEBETO at 32/hr. O2 Sat. kwadwo was 80% and mean O2 sat was 91% and baseline O2 at 95 %. O2 sat was below 90% for 31% of the flow evaluation time. Oxygen Desaturation (>=4%) Index was elevated at 21.3/hr.       PSG showed mild obstructive and central respiratory events noted. The overall AHI was 14.7  events per hour comprised of 29% hypopneas, 38% obstructive apneas and 33% central apneas. The events were associated with desaturations to a kwadwo of 83%.The sleep efficiency was very poor 23% with only 85 minutes total sleep time obtained.      REVIEW OF SYSTEMS:       Constitutional: Denies fevers, Denies weight changes  Eyes: Denies changes in vision, no eye pain  Ears/Nose/Throat/Mouth: Denies nasal congestion or sore throat    Cardiovascular: Denies chest pain or palpitations   Respiratory: Denies shortness of breath , Denies cough  Gastrointestinal/Hepatic: Denies abdominal pain,   Musculoskeletal/Rheum: Denies  joint pain and swelling   Skin/Breast: Denies rash,   Neurological: Denies headache, confusion,   Psychiatric: denies mood disorder   Sleep: denies snoring     Comprehensive review of systems form is reviewed with the patient and is attached in the EMR.     PMH:  has a past medical history of Abnormal thyroid function test (9/10/2014), Allergic rhinitis, Anxiety, Asthma, Back pain, Basal cell carcinoma, Bronchitis, Cardiac arrhythmia, Depression, Elevated liver enzymes (9/10/2014), Family history of melanoma, GERD (gastroesophageal reflux disease), Glaucoma suspect of both eyes, Hyperlipidemia, Hypertension, Nasal drainage, OCD (obsessive compulsive disorder), Pancreatitis, Parathyroid disorder (HCC), PCOS (polycystic ovarian syndrome), S/P ERCP, and Transient global amnesia.  MEDS:   Current Outpatient Medications:   •  omeprazole (PRILOSEC) 20 MG delayed-release capsule, Take 20 mg by mouth every day., Disp: , Rfl:   •  olmesartan (BENICAR) 5 MG tablet, Take 2 Tabs by mouth every day., Disp: 200 Tab, Rfl: 3  •  albuterol (PROVENTIL) 2.5mg/3ml Nebu Soln solution for nebulization, 3 mL by Nebulization route every 6 hours as needed for Shortness of Breath., Disp: 30 Bullet, Rfl: 3  •  acetaminophen (TYLENOL) 500 MG Tab, Take 500 mg by mouth every 6 hours as needed., Disp: , Rfl:   •  Cholecalciferol (VITAMIN D) 2000 UNIT Tab, Take 4,000 Units by mouth., Disp: , Rfl:   •  aspirin EC (ECOTRIN) 81 MG Tablet Delayed Response, Take 81 mg by mouth., Disp: , Rfl:   •  famotidine (PEPCID) 20 MG Tab, Take 1 Tab by mouth 2 times a day., Disp: 60 Tab, Rfl: 3  •  azelastine (OPTIVAR) 0.05 % ophthalmic solution, 1 DROP IN LEFT EYE 2X A DAY X7 DAYS REMOVE CONTACTS USE DROPS USE AGAIN IN 10MIN IF EYE IS STILL RED, Disp: , Rfl: 0  •  Influenza  Vac Split Quad (AFLURIA QUADRIVALENT IM), TO BE ADMINISTERED BY PHARMACIST FOR IMMUNIZATION, Disp: , Rfl: 0  •  Misc. Devices Misc, Please provide 1 nebulizer advised to use albuterol nebulizer., Disp: 1 Device, Rfl: 0  •  VOLTAREN 1 % Gel, APPLY TO AFFECTED AREA 2 TO 3 TIMES PER DAY AS NEEDED FOR PAIN, Disp: , Rfl: 0  •  cetirizine (ZYRTEC) 10 MG TABS, Take 10 mg by mouth 1 time daily as needed., Disp: , Rfl:   ALLERGIES:   Allergies   Allergen Reactions   • Ace Inhibitors      Cough   • Ceclor [Cefaclor] Hives   • Ceftin Hives   • Cephalosporins Rash   • Ciprofloxacin Rash   • Kenalog Hives   • Lamisil [Terbinafine Hcl]    • Latex Rash   • Merthiolate [Thimerosal] Hives   • Monistat [Tioconazole] Itching   • Pcn [Penicillins] Hives   • Benzalkonium Chloride Rash   • Tetracyclines Rash     SURGHX:   Past Surgical History:   Procedure Laterality Date   • ABDOMINAL HYSTERECTOMY TOTAL      ovaries remain   • ADENOIDECTOMY     • CARDIAC CATH, RIGHT HEART      normal, EKG was Abnormal    • CHOLECYSTECTOMY     • DILATION AND CURETTAGE     • EYE SURGERY      b/l iridotomy   • HYSTERECTOMY LAPAROSCOPY     • LUMPECTOMY      benign   • TONSILLECTOMY     • US-NEEDLE CORE BX-BREAST PANEL       SOCHX:  reports that she has never smoked. She has never used smokeless tobacco. She reports that she does not drink alcohol or use drugs..  FH:   Family History   Problem Relation Age of Onset   • Hypertension Mother    • Heart Disease Father         CHF   • Cancer Father         melanoma   • Hypertension Sister    • Stroke Sister    • Sleep Apnea Sister    • Cancer Paternal Grandfather         colon   • Heart Disease Paternal Grandfather    • Stroke Paternal Grandfather    • Diabetes Paternal Grandfather    • Other Maternal Grandmother         eplepsey   • Heart Attack Maternal Grandfather    • Stroke Maternal Grandfather    • Stroke Paternal Grandmother    • Sleep Apnea Sister          Physical Exam:  Vitals/ General Appearance:  "  Weight/BMI: Body mass index is 28.29 kg/m².  /72 (BP Location: Left arm, Patient Position: Sitting, BP Cuff Size: Adult)   Pulse (!) 58   Resp 15   Ht 1.651 m (5' 5\")   Wt 77.1 kg (170 lb)   SpO2 96%   Vitals:    12/12/19 0804   BP: 122/72   BP Location: Left arm   Patient Position: Sitting   BP Cuff Size: Adult   Pulse: (!) 58   Resp: 15   SpO2: 96%   Weight: 77.1 kg (170 lb)   Height: 1.651 m (5' 5\")       Pt. is alert and oriented to time, place and person. Cooperative and in no apparent distress.       -Head: Atraumatic, normocephalic.   -. Nose: No inferior turbinate hypertophy  -. Throat: Oropharynx appears crowded in that the palate is overhanging (Malam Mariana scale 4. Tonsils are not enlarged, uvula is large, pharynx not inflamed.   -. Neck: Supple. No thyromegaly  -. Chest: Trachea central, no spine deformity   -. Lungs auscultation: B/L good air entry, vesicular breath sounds, no adventitious sounds  -. Heart auscultation: 1st and 2nd heart sounds normal, regular rhythm. No appreciable murmur.  - Extremities:no pedal edema.  - Skin: No visble rash  - NEUROLOGICAL EXAMINATION: On neurological exam, the patient was alert and oriented . speech was clear and fluent without dysarthria.      ASSESSMENT AND PLAN     1. Sleep Apnea .   The pathophysiology of sleep anea and the increased risk of cardiovascular morbidity from untreated sleep apnea is discussed in detail with the patient.   She is urged to avoid supine sleep, weight gain and alcoholic beverages since all of these can worsen sleep apnea. She is cautioned against drowsy driving. If She feels sleepy while driving, She must pull over for a break/nap, rather than persist on the road, in the interest of She own safety and that of others on the road.   Plan   -  dental appliance and surgeries was dicussed in detail. After informed discussion she would like  to try Inspire as a treatment for VALERIE. Referral is placed to [facility] "     2. Regarding treatment of other past medical problems and general health maintenance,  She is urged to follow up with PCP.

## 2019-12-18 ENCOUNTER — APPOINTMENT (OUTPATIENT)
Dept: PHYSICAL MEDICINE AND REHAB | Facility: MEDICAL CENTER | Age: 74
End: 2019-12-18
Payer: MEDICARE

## 2020-01-24 ENCOUNTER — OFFICE VISIT (OUTPATIENT)
Dept: MEDICAL GROUP | Facility: IMAGING CENTER | Age: 75
End: 2020-01-24
Payer: MEDICARE

## 2020-01-24 VITALS
SYSTOLIC BLOOD PRESSURE: 124 MMHG | HEIGHT: 65 IN | BODY MASS INDEX: 27.52 KG/M2 | RESPIRATION RATE: 16 BRPM | WEIGHT: 165.2 LBS | DIASTOLIC BLOOD PRESSURE: 82 MMHG | HEART RATE: 69 BPM | OXYGEN SATURATION: 96 % | TEMPERATURE: 98.1 F

## 2020-01-24 DIAGNOSIS — G47.30 SLEEP APNEA, UNSPECIFIED TYPE: ICD-10-CM

## 2020-01-24 DIAGNOSIS — R00.2 PALPITATIONS: ICD-10-CM

## 2020-01-24 DIAGNOSIS — K21.9 GASTROESOPHAGEAL REFLUX DISEASE WITHOUT ESOPHAGITIS: ICD-10-CM

## 2020-01-24 DIAGNOSIS — R07.89 CHEST WALL DISCOMFORT: ICD-10-CM

## 2020-01-24 DIAGNOSIS — I10 ESSENTIAL HYPERTENSION: ICD-10-CM

## 2020-01-24 PROCEDURE — 99214 OFFICE O/P EST MOD 30 MIN: CPT | Mod: 25 | Performed by: FAMILY MEDICINE

## 2020-01-24 PROCEDURE — 93000 ELECTROCARDIOGRAM COMPLETE: CPT | Performed by: FAMILY MEDICINE

## 2020-01-24 RX ORDER — LOSARTAN POTASSIUM 25 MG/1
25 TABLET ORAL 2 TIMES DAILY
Qty: 180 TAB | Refills: 1 | Status: SHIPPED | OUTPATIENT
Start: 2020-01-24 | End: 2020-04-19 | Stop reason: SDUPTHER

## 2020-01-25 NOTE — PROGRESS NOTES
SUBJECTIVE:        Chief Complaint   Patient presents with   • Blood Pressure Problem     higher reading, 172/112 last night after apnea episode    • Sleep Problem     unable to use cpap       HPI:    Martha Ordonez is a 74 y.o. female with hypertension, hypercholesterolemia, history of PAC and PVCs, anxiety and GERD here for follow-up of hypertension and sleep apnea.  Blood pressure has been running a little higher this week due to some sleep apnea episodes.   She has been increasing her blood pressure medication to losartan 50 mg daily.  Has tolerated medication well.  This last week has noticed a couple apneic episodes with her sleep at.  Awoke gasping and noticed that her heart rate and blood pressure were elevated.  Has found a pattern that if she eats after 6 PM might notice more symptoms. Helps to eat before that time. Does not make a difference if she sleeps on her side.   Uses sensimist nasal spray as there is always something that may cause some nasal congestion. When taking a deep breath might feel a little something of her left chest wall area. Does not feel like an arrhythmia like SVT to patient she has a history of PACs and PVCs. No other chest pressure or significant pain.   She has noticed that walking an hour a day has been helpful in many regards.  Has not issues with walking.   Heart rate gets up to 120.   Has been working on losing some weight to help with her sleep apnea.  Noticed slight left side chest wall tenderness.  No chest pressure.  History of PAD  She has an appointment with [place] May 14, but requests referral to neurology, Dr. Perez, as she may have a combined type of sleep apnea.       ROS:  Denies any recent fevers or chills. No nausea or vomiting. No diarrhea. No chest pains or shortness of breath. No lower extremity edema.    Current Outpatient Medications on File Prior to Visit   Medication Sig Dispense Refill   • omeprazole (PRILOSEC) 10 MG CAPSULE DELAYED RELEASE Take 1  Cap by mouth 2 times a day. 60 Cap 3   • azelastine (OPTIVAR) 0.05 % ophthalmic solution 1 DROP IN LEFT EYE 2X A DAY X7 DAYS REMOVE CONTACTS USE DROPS USE AGAIN IN 10MIN IF EYE IS STILL RED  0   • albuterol (PROVENTIL) 2.5mg/3ml Nebu Soln solution for nebulization 3 mL by Nebulization route every 6 hours as needed for Shortness of Breath. 30 Bullet 3   • Misc. Devices Misc Please provide 1 nebulizer advised to use albuterol nebulizer. 1 Device 0   • acetaminophen (TYLENOL) 500 MG Tab Take 500 mg by mouth every 6 hours as needed.     • Cholecalciferol (VITAMIN D) 2000 UNIT Tab Take 4,000 Units by mouth.     • Influenza Vac Split Quad (AFLURIA QUADRIVALENT IM) TO BE ADMINISTERED BY PHARMACIST FOR IMMUNIZATION  0   • VOLTAREN 1 % Gel APPLY TO AFFECTED AREA 2 TO 3 TIMES PER DAY AS NEEDED FOR PAIN  0   • aspirin EC (ECOTRIN) 81 MG Tablet Delayed Response Take 81 mg by mouth.     • cetirizine (ZYRTEC) 10 MG TABS Take 10 mg by mouth 1 time daily as needed.       No current facility-administered medications on file prior to visit.        Allergies   Allergen Reactions   • Ace Inhibitors      Cough   • Ceclor [Cefaclor] Hives   • Ceftin Hives   • Cephalosporins Rash   • Ciprofloxacin Rash   • Kenalog Hives   • Lamisil [Terbinafine Hcl]    • Latex Rash   • Merthiolate [Thimerosal] Hives   • Monistat [Tioconazole] Itching   • Pcn [Penicillins] Hives   • Benzalkonium Chloride Rash   • Tetracyclines Rash       Patient Active Problem List    Diagnosis Date Noted   • Incomplete emptying of bladder 03/04/2019   • Chronic right-sided low back pain with right-sided sciatica 01/16/2019   • Pelvic pain 01/16/2019   • Migraine without aura and without status migrainosus, not intractable 08/16/2018   • VALERIE (obstructive sleep apnea) 03/23/2018   • PVCs (premature ventricular contractions) 09/04/2017   • PAC (premature atrial contraction) 09/04/2017   • Nocturnal oxygen desaturation 08/31/2017   • Elevated alkaline phosphatase level  "06/29/2016   • Increased PTH level 05/04/2016   • Vitamin D insufficiency 05/04/2016   • Environmental allergies 02/21/2016   • OCD (obsessive compulsive disorder)    • Family history of melanoma 02/12/2016   • Fatigue 12/31/2015   • Mild asthma 09/10/2014   • Pure hypercholesterolemia 09/10/2014   • Essential hypertension 09/10/2014   • Anxiety 09/10/2014   • Transient global amnesia 09/10/2014   • Gastroesophageal reflux disease without esophagitis 09/10/2014       Past Medical History:   Diagnosis Date   • Abnormal thyroid function test 9/10/2014   • Allergic rhinitis    • Anxiety    • Asthma    • Back pain    • Basal cell carcinoma     squamos cell right ear   • Bronchitis    • Cardiac arrhythmia    • Depression    • Elevated liver enzymes 9/10/2014   • Family history of melanoma    • GERD (gastroesophageal reflux disease)    • Glaucoma suspect of both eyes    • Hyperlipidemia    • Hypertension    • Nasal drainage    • OCD (obsessive compulsive disorder)    • Pancreatitis    • Parathyroid disorder (HCC)    • PCOS (polycystic ovarian syndrome)    • S/P ERCP    • Transient global amnesia        OBJECTIVE:   /82 (BP Location: Left arm, Patient Position: Sitting, BP Cuff Size: Adult)   Pulse 69   Temp 36.7 °C (98.1 °F) (Temporal)   Resp 16   Ht 1.651 m (5' 5\")   Wt 74.9 kg (165 lb 3.2 oz)   LMP  (LMP Unknown)   SpO2 96%   BMI 27.49 kg/m²   General: Well-developed well-nourished female, no acute distress  Neck: supple, no lymphadenopathy- cervical or supraclavicular, no thyromegaly  Cardiovascular: regular rate and rhythm, no murmurs, gallops, rubs  Lungs: clear to auscultation bilaterally, no wheezes, crackles, or rhonchi  Chest wall: left chest wall region with slight TTP.   Abdomen: +bowel sounds, soft, nontender, nondistended, no rebound, no guarding, no hepatosplenomegaly  Extremities: no cyanosis, clubbing, edema  Skin: Warm and dry  Psych: appropriate mood and affect    EKG: sinus, HR 53, left " axis deviation. Minimal ST depression of V4-5, mainly in V5,6 which appears similar to 2017 EKG.   Last echocardiogram 9/2019  Holter monitor 2017    ASSESSMENT/PLAN:    74 y.o.female with hypertension, hypercholesterolemia, history of PAC and PVCs, anxiety, GERD and sleep apnea.     1. Essential hypertension -stable, continue on losartan 50 mg daily, may increase to 100 mg daily if any continued elevated BP. Monitor BP, keep log.     2. Palpitations - patient with hx of pac and pvc. Suspect likely associated with current issues with sleep apnea. Patient plans to follow up for sleep apnea management.   Consider repeat holter monitor if continued or worsening symptoms. See specialist for sleep apnea as discussed.  EKG   3. Chest wall discomfort - likely msk etiology.   -If continued or worsening symptoms, obtain CXR. Monitor.  CXR   4. Gastroesophageal reflux disease without esophagitis -may be contributing to sleep apnea symptoms at night.   -Recommend modify diet, avoid eating past 6 pm, raise head of bed, keep evening meals light, and take omeprazole 10 mg twice daily.     5. Sleep apnea, unspecified type- appears to have some component of central sleep apnea.   Referral done per patient request, referral to Dr. Perez for further evaluation and management. REFERRAL TO NEUROLOGY     Follow up in 4-6 weeks, sooner as needed.       This medical record contains text that has been entered with the assistance of computer voice recognition and dictation software.  Therefore, it may contain unintended errors in text, spelling, punctuation, or grammar.

## 2020-02-05 ENCOUNTER — APPOINTMENT (RX ONLY)
Dept: URBAN - METROPOLITAN AREA CLINIC 4 | Facility: CLINIC | Age: 75
Setting detail: DERMATOLOGY
End: 2020-02-05

## 2020-02-05 DIAGNOSIS — D18.0 HEMANGIOMA: ICD-10-CM

## 2020-02-05 DIAGNOSIS — Z80.8 FAMILY HISTORY OF MALIGNANT NEOPLASM OF OTHER ORGANS OR SYSTEMS: ICD-10-CM

## 2020-02-05 DIAGNOSIS — Z71.89 OTHER SPECIFIED COUNSELING: ICD-10-CM

## 2020-02-05 DIAGNOSIS — L57.0 ACTINIC KERATOSIS: ICD-10-CM

## 2020-02-05 DIAGNOSIS — L82.1 OTHER SEBORRHEIC KERATOSIS: ICD-10-CM

## 2020-02-05 DIAGNOSIS — L81.4 OTHER MELANIN HYPERPIGMENTATION: ICD-10-CM

## 2020-02-05 DIAGNOSIS — L57.8 OTHER SKIN CHANGES DUE TO CHRONIC EXPOSURE TO NONIONIZING RADIATION: ICD-10-CM

## 2020-02-05 DIAGNOSIS — Z85.828 PERSONAL HISTORY OF OTHER MALIGNANT NEOPLASM OF SKIN: ICD-10-CM

## 2020-02-05 DIAGNOSIS — D22 MELANOCYTIC NEVI: ICD-10-CM

## 2020-02-05 PROBLEM — D22.5 MELANOCYTIC NEVI OF TRUNK: Status: ACTIVE | Noted: 2020-02-05

## 2020-02-05 PROBLEM — D18.01 HEMANGIOMA OF SKIN AND SUBCUTANEOUS TISSUE: Status: ACTIVE | Noted: 2020-02-05

## 2020-02-05 PROBLEM — D48.5 NEOPLASM OF UNCERTAIN BEHAVIOR OF SKIN: Status: ACTIVE | Noted: 2020-02-05

## 2020-02-05 PROCEDURE — ? COUNSELING

## 2020-02-05 PROCEDURE — 11102 TANGNTL BX SKIN SINGLE LES: CPT

## 2020-02-05 PROCEDURE — 99213 OFFICE O/P EST LOW 20 MIN: CPT | Mod: 25

## 2020-02-05 PROCEDURE — ? ADDITIONAL NOTES

## 2020-02-05 PROCEDURE — 17000 DESTRUCT PREMALG LESION: CPT | Mod: 59

## 2020-02-05 PROCEDURE — ? LIQUID NITROGEN

## 2020-02-05 PROCEDURE — ? DIAGNOSIS COMMENT

## 2020-02-05 PROCEDURE — ? BIOPSY BY SHAVE METHOD

## 2020-02-05 ASSESSMENT — LOCATION ZONE DERM
LOCATION ZONE: NOSE
LOCATION ZONE: FACE
LOCATION ZONE: EAR
LOCATION ZONE: NECK
LOCATION ZONE: ARM
LOCATION ZONE: TRUNK

## 2020-02-05 ASSESSMENT — LOCATION DETAILED DESCRIPTION DERM
LOCATION DETAILED: INFERIOR THORACIC SPINE
LOCATION DETAILED: LEFT PROXIMAL DORSAL FOREARM
LOCATION DETAILED: LEFT SUPERIOR MEDIAL LOWER BACK
LOCATION DETAILED: RIGHT SUPERIOR MEDIAL BUCCAL CHEEK
LOCATION DETAILED: LEFT INFERIOR CENTRAL MALAR CHEEK
LOCATION DETAILED: LEFT PROXIMAL POSTERIOR UPPER ARM
LOCATION DETAILED: STERNAL NOTCH
LOCATION DETAILED: RIGHT ANTERIOR EARLOBE
LOCATION DETAILED: NASAL DORSUM
LOCATION DETAILED: RIGHT INFERIOR LATERAL NECK
LOCATION DETAILED: RIGHT SUPERIOR MEDIAL MIDBACK
LOCATION DETAILED: LEFT ANTERIOR EARLOBE
LOCATION DETAILED: RIGHT PROXIMAL DORSAL FOREARM
LOCATION DETAILED: LEFT DISTAL DORSAL FOREARM
LOCATION DETAILED: LEFT SUPERIOR MEDIAL BUCCAL CHEEK

## 2020-02-05 ASSESSMENT — LOCATION SIMPLE DESCRIPTION DERM
LOCATION SIMPLE: UPPER BACK
LOCATION SIMPLE: CHEST
LOCATION SIMPLE: LEFT LOWER BACK
LOCATION SIMPLE: RIGHT ANTERIOR NECK
LOCATION SIMPLE: LEFT CHEEK
LOCATION SIMPLE: RIGHT LOWER BACK
LOCATION SIMPLE: LEFT EAR
LOCATION SIMPLE: RIGHT FOREARM
LOCATION SIMPLE: NOSE
LOCATION SIMPLE: RIGHT EAR
LOCATION SIMPLE: LEFT POSTERIOR UPPER ARM
LOCATION SIMPLE: LEFT FOREARM
LOCATION SIMPLE: RIGHT CHEEK

## 2020-02-05 NOTE — PROCEDURE: MIPS QUALITY
Quality 226: Preventive Care And Screening: Tobacco Use: Screening And Cessation Intervention: Patient screened for tobacco use and is an ex/non-smoker
Detail Level: Detailed
Quality 111:Pneumonia Vaccination Status For Older Adults: Pneumococcal Vaccination Previously Received
Quality 130: Documentation Of Current Medications In The Medical Record: Current Medications Documented

## 2020-02-05 NOTE — PROCEDURE: LIQUID NITROGEN
Detail Level: Detailed
Render Post-Care Instructions In Note?: no
Consent: The patient's consent was obtained including but not limited to risks of crusting, scabbing, blistering, scarring, darker or lighter pigmentary change, recurrence, incomplete removal and infection.
Duration Of Freeze Thaw-Cycle (Seconds): 0
Post-Care Instructions: I reviewed with the patient in detail post-care instructions. Patient is to wear sunprotection, and avoid picking at any of the treated lesions. Pt may apply Vaseline  or Aquaphor to crusted or scabbing areas.

## 2020-02-05 NOTE — PROCEDURE: BIOPSY BY SHAVE METHOD
Detail Level: Detailed
Depth Of Biopsy: dermis
Was A Bandage Applied: Yes
Size Of Lesion In Cm: 0.7
X Size Of Lesion In Cm: 0
Biopsy Type: H and E
Biopsy Method: 15 blade
Anesthesia Type: 1% lidocaine with epinephrine
Hemostasis: Aluminum Chloride and Electrocautery
Wound Care: Aquaphor
Dressing: Band-Aid
Destruction After The Procedure: No
Type Of Destruction Used: Curettage
Curettage Text: The wound bed was treated with curettage after the biopsy was performed.
Cryotherapy Text: The wound bed was treated with cryotherapy after the biopsy was performed.
Electrodesiccation Text: The wound bed was treated with electrodesiccation after the biopsy was performed.
Electrodesiccation And Curettage Text: The wound bed was treated with electrodesiccation and curettage after the biopsy was performed.
Silver Nitrate Text: The wound bed was treated with silver nitrate after the biopsy was performed.
Lab: [id number]
Lab Facility: 
Consent: Written consent was obtained and risks were reviewed including but not limited to scarring, infection, bleeding, scabbing, incomplete removal, nerve damage and allergy to anesthesia.
Post-Care Instructions: I reviewed with the patient in detail post-care instructions. Patient is to keep the biopsy site dry overnight, and then apply bacitracin twice daily until healed. Patient may apply hydrogen peroxide soaks to remove any crusting.
Notification Instructions: Patient will be notified of biopsy results. However, patient instructed to call the office if not contacted within 2 weeks.
Billing Type: Third-Party Bill

## 2020-03-19 RX ORDER — METHOCARBAMOL 500 MG/1
500-1000 TABLET, FILM COATED ORAL 2 TIMES DAILY PRN
Qty: 60 TAB | Refills: 0 | Status: SHIPPED | OUTPATIENT
Start: 2020-03-19 | End: 2020-05-23

## 2020-05-22 ENCOUNTER — PATIENT MESSAGE (OUTPATIENT)
Dept: SLEEP MEDICINE | Facility: MEDICAL CENTER | Age: 75
End: 2020-05-22

## 2020-05-22 DIAGNOSIS — G47.31 CENTRAL SLEEP APNEA: ICD-10-CM

## 2020-05-22 NOTE — TELEPHONE ENCOUNTER
From: Martha Ordonez  To: Harshad Mason M.D.  Sent: 5/22/2020 3:21 AM PDT  Subject: Procedure Question    Dr. Mason, My sleep has deteriorated. I am waking up almost every hour with heart racing. Then I'm awake for an hour until it calms down only for the same thing to happen when I go back to sleep. I did not go to [facility] due to Covid restrictions. I need to try the machine again. Is it possible to come into the clinic for an appointment at night and have a tech work with me. One of the problems I had with the machine was that it was sending me a breath when I was in the middle of a breath and then I felt smothered. I tried changing all the setting to no avail--BPM, pressure, etc. Please advise how I should proceed. If it is not possible to come in, if you will reorder I will try again.  Thank you.  Martha Ordonez

## 2020-05-23 ENCOUNTER — APPOINTMENT (OUTPATIENT)
Dept: RADIOLOGY | Facility: MEDICAL CENTER | Age: 75
End: 2020-05-23
Attending: EMERGENCY MEDICINE
Payer: MEDICARE

## 2020-05-23 ENCOUNTER — HOSPITAL ENCOUNTER (EMERGENCY)
Facility: MEDICAL CENTER | Age: 75
End: 2020-05-23
Attending: EMERGENCY MEDICINE
Payer: MEDICARE

## 2020-05-23 VITALS
HEART RATE: 58 BPM | DIASTOLIC BLOOD PRESSURE: 87 MMHG | RESPIRATION RATE: 18 BRPM | HEIGHT: 65 IN | WEIGHT: 167.55 LBS | OXYGEN SATURATION: 97 % | TEMPERATURE: 97.3 F | SYSTOLIC BLOOD PRESSURE: 163 MMHG | BODY MASS INDEX: 27.92 KG/M2

## 2020-05-23 DIAGNOSIS — R06.00 NOCTURNAL DYSPNEA: ICD-10-CM

## 2020-05-23 DIAGNOSIS — G47.33 OSA (OBSTRUCTIVE SLEEP APNEA): ICD-10-CM

## 2020-05-23 DIAGNOSIS — G47.34 NOCTURNAL OXYGEN DESATURATION: ICD-10-CM

## 2020-05-23 LAB
ALBUMIN SERPL BCP-MCNC: 4.3 G/DL (ref 3.2–4.9)
ALBUMIN/GLOB SERPL: 1.6 G/DL
ALP SERPL-CCNC: 137 U/L (ref 30–99)
ALT SERPL-CCNC: 17 U/L (ref 2–50)
ANION GAP SERPL CALC-SCNC: 12 MMOL/L (ref 7–16)
AST SERPL-CCNC: 24 U/L (ref 12–45)
BASOPHILS # BLD AUTO: 0.4 % (ref 0–1.8)
BASOPHILS # BLD: 0.03 K/UL (ref 0–0.12)
BILIRUB SERPL-MCNC: 1 MG/DL (ref 0.1–1.5)
BUN SERPL-MCNC: 12 MG/DL (ref 8–22)
CALCIUM SERPL-MCNC: 9.6 MG/DL (ref 8.4–10.2)
CHLORIDE SERPL-SCNC: 104 MMOL/L (ref 96–112)
CO2 SERPL-SCNC: 23 MMOL/L (ref 20–33)
CREAT SERPL-MCNC: 0.75 MG/DL (ref 0.5–1.4)
EKG IMPRESSION: NORMAL
EOSINOPHIL # BLD AUTO: 0.08 K/UL (ref 0–0.51)
EOSINOPHIL NFR BLD: 1.1 % (ref 0–6.9)
ERYTHROCYTE [DISTWIDTH] IN BLOOD BY AUTOMATED COUNT: 39.7 FL (ref 35.9–50)
GLOBULIN SER CALC-MCNC: 2.7 G/DL (ref 1.9–3.5)
GLUCOSE SERPL-MCNC: 118 MG/DL (ref 65–99)
HCT VFR BLD AUTO: 42.6 % (ref 37–47)
HGB BLD-MCNC: 14.5 G/DL (ref 12–16)
IMM GRANULOCYTES # BLD AUTO: 0.02 K/UL (ref 0–0.11)
IMM GRANULOCYTES NFR BLD AUTO: 0.3 % (ref 0–0.9)
LYMPHOCYTES # BLD AUTO: 2.05 K/UL (ref 1–4.8)
LYMPHOCYTES NFR BLD: 28.2 % (ref 22–41)
MCH RBC QN AUTO: 31.9 PG (ref 27–33)
MCHC RBC AUTO-ENTMCNC: 34 G/DL (ref 33.6–35)
MCV RBC AUTO: 93.8 FL (ref 81.4–97.8)
MONOCYTES # BLD AUTO: 0.52 K/UL (ref 0–0.85)
MONOCYTES NFR BLD AUTO: 7.2 % (ref 0–13.4)
NEUTROPHILS # BLD AUTO: 4.56 K/UL (ref 2–7.15)
NEUTROPHILS NFR BLD: 62.8 % (ref 44–72)
NRBC # BLD AUTO: 0 K/UL
NRBC BLD-RTO: 0 /100 WBC
PLATELET # BLD AUTO: 209 K/UL (ref 164–446)
PMV BLD AUTO: 9.4 FL (ref 9–12.9)
POTASSIUM SERPL-SCNC: 4.4 MMOL/L (ref 3.6–5.5)
PROT SERPL-MCNC: 7 G/DL (ref 6–8.2)
RBC # BLD AUTO: 4.54 M/UL (ref 4.2–5.4)
SODIUM SERPL-SCNC: 139 MMOL/L (ref 135–145)
TROPONIN T SERPL-MCNC: <6 NG/L (ref 6–19)
WBC # BLD AUTO: 7.3 K/UL (ref 4.8–10.8)

## 2020-05-23 PROCEDURE — 80053 COMPREHEN METABOLIC PANEL: CPT

## 2020-05-23 PROCEDURE — 84484 ASSAY OF TROPONIN QUANT: CPT

## 2020-05-23 PROCEDURE — 71045 X-RAY EXAM CHEST 1 VIEW: CPT

## 2020-05-23 PROCEDURE — 85025 COMPLETE CBC W/AUTO DIFF WBC: CPT

## 2020-05-23 PROCEDURE — 93005 ELECTROCARDIOGRAM TRACING: CPT | Performed by: EMERGENCY MEDICINE

## 2020-05-23 PROCEDURE — 99284 EMERGENCY DEPT VISIT MOD MDM: CPT

## 2020-05-23 PROCEDURE — 36415 COLL VENOUS BLD VENIPUNCTURE: CPT

## 2020-05-23 RX ORDER — AZITHROMYCIN MONOHYDRATE 10 MG/ML
1 SOLUTION/ DROPS OPHTHALMIC 2 TIMES DAILY
Status: ON HOLD | COMMUNITY
End: 2020-05-25

## 2020-05-23 ASSESSMENT — FIBROSIS 4 INDEX: FIB4 SCORE: 1.72

## 2020-05-23 NOTE — ED NOTES
Patient resting in bed. Patient occasionally self adjusts in bed. Bed is at lowest position and locked. Call light and preferred belongings in reach. Patient denies pain and any current needs. Will continue to monitor.

## 2020-05-23 NOTE — ED TRIAGE NOTES
"Presents accompanied by family.  Pt reports a recent diagnosis of sleep apnea, and worsening ability to sleep without \"too many interruptions.\"   Her CPAP apparatus was not performing appropriately, and she has not been able to obtain any significant assistance from \"the supplier.\"   Pt denies any domestic high risk areas, or international travel within the past 14 days.  He has been encouraged to keep the cell phone readily available since a pharmacy tech is likely to call to reconcile home medications.  The pharmacy of choice has been updated.    Chief Complaint   Patient presents with   • Sleep Problem   • Shortness of Breath     BP (!) 184/119   Pulse 73   Temp 36.3 °C (97.3 °F) (Temporal)   Resp 18   Ht 1.651 m (5' 5\")   Wt 76 kg (167 lb 8.8 oz)   LMP  (LMP Unknown)   SpO2 95%   BMI 27.88 kg/m²     "

## 2020-05-23 NOTE — ED PROVIDER NOTES
ED Provider Note    CHIEF COMPLAINT  Chief Complaint   Patient presents with   • Sleep Problem   • Shortness of Breath       HPI  Martha Ordonez is a 74 y.o. female who presents with difficult to sleep and chest pain and shortness of breath and middle the night.  Patient has a history of essential sleep apnea.  She has been on oxygen in the past and has had sleep studies which show sleep apnea.  She tried a CPAP machine a few months ago but was having issues with it.  Over the last week she has had nocturnal awakenings accompanied with increased anxiety.  Last night she had some chest heaviness and right arm numbness associated with this.  She wakes up in the middle the night and it takes over an hour for her to get back to sleep so she is only sleeping a few hours a night.  She is quite anxious and feels like she is going to die.  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  She is concerned about having an arrhythmia in the middle the night or even having a heart attack in the middle the night.  She currently has no chest pain or shortness of breath.      REVIEW OF SYSTEMS  positive for nocturnal awakenings, chest pain and shortness of breath and middle the night, negative for fevers coughs. All other systems are negative.     PAST MEDICAL HISTORY   has a past medical history of Abnormal thyroid function test (9/10/2014), Allergic rhinitis, Anxiety, Asthma, Back pain, Basal cell carcinoma, Bronchitis, Cardiac arrhythmia, Depression, Elevated liver enzymes (9/10/2014), Family history of melanoma, GERD (gastroesophageal reflux disease), Glaucoma suspect of both eyes, Hyperlipidemia, Hypertension, Nasal drainage, OCD (obsessive compulsive disorder), Pancreatitis, Parathyroid disorder (HCC), PCOS (polycystic ovarian syndrome), S/P ERCP, and Transient global amnesia.    SOCIAL  "HISTORY  Social History     Tobacco Use   • Smoking status: Never Smoker   • Smokeless tobacco: Never Used   Substance and Sexual Activity   • Alcohol use: No     Alcohol/week: 0.0 oz   • Drug use: No   • Sexual activity: Yes     Partners: Male     Comment: , retired RN       SURGICAL HISTORY   has a past surgical history that includes cholecystectomy; tonsillectomy; lumpectomy; abdominal hysterectomy total; cardiac cath, right heart; eye surgery; us-needle core bx-breast panel; hysterectomy laparoscopy; dilation and curettage; and adenoidectomy.    CURRENT MEDICATIONS  Home Medications     Reviewed by Cordell Abdi (Pharmacy Tech) on 05/23/20 at 1515  Med List Status: Complete   Medication Last Dose Status   azithromycin (AZASITE) 1 % ophthalmic solution 5/23/2020 Active   Cholecalciferol (VITAMIN D) 2000 UNIT Tab 5/23/2020 Active   losartan (COZAAR) 25 MG Tab 5/23/2020 Active   omeprazole (PRILOSEC) 10 MG CAPSULE DELAYED RELEASE 5/23/2020 Active   Polyvinyl Alcohol-Povidone (REFRESH OP) 5/23/2020 Active                ALLERGIES  Allergies   Allergen Reactions   • Ace Inhibitors      Cough   • Ceclor [Cefaclor] Hives   • Ceftin Hives   • Cephalosporins Rash   • Ciprofloxacin Rash   • Kenalog Hives   • Lamisil [Terbinafine Hcl]    • Latex Rash   • Merthiolate [Thimerosal] Hives   • Monistat [Tioconazole] Itching   • Pcn [Penicillins] Hives   • Benzalkonium Chloride Rash   • Tetracyclines Rash       PHYSICAL EXAM  VITAL SIGNS: BP (!) 163/87   Pulse (!) 58   Temp 36.3 °C (97.3 °F) (Temporal)   Resp 18   Ht 1.651 m (5' 5\")   Wt 76 kg (167 lb 8.8 oz)   LMP  (LMP Unknown)   SpO2 97%   BMI 27.88 kg/m² .  Constitutional: Alert in no apparent distress.  HENT: No signs of trauma, Bilateral external ears normal, Nose normal.   Eyes: Pupils are equal and reactive, Conjunctiva normal, Non-icteric.   Neck: Normal range of motion, No tenderness, Supple, No stridor.   Cardiovascular: Regular rate and " rhythm, no murmurs.   Thorax & Lungs: Normal breath sounds, No respiratory distress, No wheezing, No chest tenderness.   Abdomen: Bowel sounds normal, Soft, No tenderness, No masses, No peritoneal signs.  Skin: Warm, Dry, No erythema, No rash.    Musculoskeletal:  no major deformities noted.   Neurologic: Alert,  No focal deficits noted.   Psychiatric: Affect normal, Judgment normal, Mood normal.       DIAGNOSTIC STUDIES / PROCEDURES      EKG  Results for orders placed or performed during the hospital encounter of 20   EKG (Now)   Result Value Ref Range    Report       [facility] Emergency Dept.    Test Date:  2020  Pt Name:    DEB BANGURA                 Department: [facility]  MRN:        [id number]                      Room:       -ROOM 10  Gender:     Female                       Technician: GT  :        1945                   Requested By:MAT GONZALEZ  Order #:    [id number]                    Reading MD: MAT GONZALEZ    Measurements  Intervals                                Axis  Rate:       59                           P:          45  MI:         114                          QRS:        -45  QRSD:       98                           T:  QT:         398  QTc:        395    Interpretive Statements  Sinus rhythm  Borderline short MI interval  Left anterior fascicular block  Borderline repolarization abnormality  Compared to ECG 2017 13:24:54  No significant changes  Electronically Signed On 2020 16:34:08 PDT by MAT GONZALEZ           LABS  Labs Reviewed   COMP METABOLIC PANEL - Abnormal; Notable for the following components:       Result Value    Glucose 118 (*)     Alkaline Phosphatase 137 (*)     All other components within normal limits   CBC WITH DIFFERENTIAL   TROPONIN   ESTIMATED GFR       RADIOLOGY  DX-CHEST-PORTABLE (1 VIEW)   Final Result      No acute cardiopulmonary abnormality.              COURSE & MEDICAL DECISION MAKING  Pertinent Labs & Imaging  studies reviewed. (See chart for details)  This is a 74-year-old female that presents with frequent nighttime awakenings and difficulty sleeping with shortness of breath.  She does have a history of sleep apnea and is not currently on a sleep apnea machine as she had some issues with it in the past.  I suspect that she is having frequent awakenings and this is more anxiety that is accompanying the nighttime hypoxia that is keeping her awake.  However she was complaining of chest pain with some arm numbness and therefore cardiac evaluation was performed.    Labs show normal troponin she has not anemic or polycythemic.  She is satting 97% on room air.  Prior records are reviewed and she has had nighttime pulse oximetry readings that have gone down into the mid 80s on prior documentation.  Given that it is a holiday weekend trying to arrange for a sleep apnea machine would be impossible.  She may benefit from nocturnal oxygen through the weekend until she can follow-up with her sleep medicine physician to get appropriate CPAP machine.  Patient is willing to pay out of pocket for this.  I have written the face-to-face and DME order for this and patient and social work have arranged to get this delivered to the patient's house.  Patient will be discharged and will follow-up with her outpatient primary care and sleep doctor as an outpatient.    HTN/IDDM FOLLOW UP:  The patient has known hypertension and is being followed by their primary care doctor     The patient will return for new or worsening symptoms and is stable at the time of discharge. Patient was given return precautions. Patient and/or family member verbalizes understanding and will comply.    DISPOSITION:  Patient will be discharged home in stable condition.    FOLLOW UP:  Jarrell Beavers M.D.  [address] [place] [address]  [place] [id number] [phone]      Call Tuesday for Holter placement and cardiology follow up    [facility],  Emergency Dept  [address]  [place] [address]  [phone]    Return for worsening chest pain, shortness of breath or other concerns.      OUTPATIENT MEDICATIONS:  Discharge Medication List as of 5/23/2020  5:38 PM            FINAL IMPRESSION  1. Nocturnal dyspnea     2. Nocturnal oxygen desaturation  DME O2 New Set Up   3. VALERIE (obstructive sleep apnea)  DME O2 New Set Up       2.   3.    This dictation has been creating using voice recognition software. The accuracy of the dictation is limited the abilities of the software.  I expect there may be some errors of grammar and possibly content. I made every attempt to manually correct the errors within my dictation. However errors related to this voice recognition software may still exist and should be interpreted within the appropriate context.      The note accurately reflects work and decisions made by me.  Wendy Dugan M.D.  5/23/2020  5:56 PM

## 2020-05-24 NOTE — FACE TO FACE
"Face to Face Note  -  Durable Medical Equipment    Wendy Dugan M.D. - NPI: [id number]  I certify that this patient is under my care and that they had a durable medical equipment(DME)face to face encounter by myself that meets the physician DME face-to-face encounter requirements with this patient on:    Date of encounter:   Patient:                    MRN:                       YOB: 2019  Martha Ordonez  [id number]  1945     The encounter with the patient was in whole, or in part, for the following medical condition, which is the primary reason for durable medical equipment:  Other - Sleep Apnea    I certify that, based on my findings, the following durable medical equipment is medically necessary:  Oxygen.    HOME O2 Saturation Measurements:(Values must be present for Home Oxygen orders)         ,     ,         My Clinical findings support the need for the above equipment due to:  Other - night time hypoxia    Supporting Symptoms: The patient requires supplemental oxygen, as the following interventions have been tried with limited or no improvement: \"Positive expiratory pressure therapies    "

## 2020-05-24 NOTE — ED NOTES
Patient sent home with oxygen order and discussed cardiology follow up for holter monitor. Patient verbalized understanding to plan of care and discharge information. Patient in stable condition. No signs of distress. Patient pain free. Patient ambulatory out of ED to personal vehicle with stable gait with family.

## 2020-05-24 NOTE — DISCHARGE PLANNING
Call from Dr Dugan for assistance with home O2 for a pt in [facility] ED. Would not be able to get insurance auth until Tuesday if pt qualifies. CM asked if pt would be willing to pay cash for a month until it could be sorted out through PCP or Pulmonary MD and pt is willing.    Call to [facility] who stated pt is a Medicare pt and therefore they cannot accept cash, she states she already spoke with [employer]. Call to OLIVERIO Shell at [facility] who is calling other places to see if able to provide.    DME tip sheet faxed to Dr Dugan to enter order.

## 2020-05-24 NOTE — DISCHARGE PLANNING
Notified by MD pt needs home O2 arrangements. Family is willing to pay out of pocket if needed.     Called [facility] and spoke with Cate. Cate stated that they would be unable to verify insurance until Tuesday and that they're unable to accept pt as self-pay.    Called [facility] and spoke to on-call  Meño. Confirmed they're able to accept pt as self-pay with prescription from MD.     Written rx obtained from MD and faxed to [facility]. Meño states he will deliver equipment to pt's home.

## 2020-05-25 ENCOUNTER — APPOINTMENT (OUTPATIENT)
Dept: CARDIOLOGY | Facility: MEDICAL CENTER | Age: 75
End: 2020-05-25
Attending: HOSPITALIST
Payer: MEDICARE

## 2020-05-25 ENCOUNTER — HOSPITAL ENCOUNTER (OUTPATIENT)
Facility: MEDICAL CENTER | Age: 75
End: 2020-05-26
Attending: EMERGENCY MEDICINE | Admitting: HOSPITALIST
Payer: MEDICARE

## 2020-05-25 DIAGNOSIS — R06.02 SHORTNESS OF BREATH: ICD-10-CM

## 2020-05-25 DIAGNOSIS — R00.2 PALPITATIONS: ICD-10-CM

## 2020-05-25 PROBLEM — G47.31 PRIMARY CENTRAL SLEEP APNEA: Status: ACTIVE | Noted: 2020-05-25

## 2020-05-25 PROBLEM — G47.31 COMPLEX SLEEP APNEA SYNDROME: Status: ACTIVE | Noted: 2018-03-23

## 2020-05-25 LAB
ALBUMIN SERPL BCP-MCNC: 4.5 G/DL (ref 3.2–4.9)
ALBUMIN/GLOB SERPL: 1.7 G/DL
ALP SERPL-CCNC: 140 U/L (ref 30–99)
ALT SERPL-CCNC: 18 U/L (ref 2–50)
ANION GAP SERPL CALC-SCNC: 14 MMOL/L (ref 7–16)
AST SERPL-CCNC: 24 U/L (ref 12–45)
BASOPHILS # BLD AUTO: 0.6 % (ref 0–1.8)
BASOPHILS # BLD: 0.04 K/UL (ref 0–0.12)
BILIRUB SERPL-MCNC: 0.8 MG/DL (ref 0.1–1.5)
BUN SERPL-MCNC: 16 MG/DL (ref 8–22)
CALCIUM SERPL-MCNC: 9.6 MG/DL (ref 8.4–10.2)
CHLORIDE SERPL-SCNC: 102 MMOL/L (ref 96–112)
CO2 SERPL-SCNC: 24 MMOL/L (ref 20–33)
CREAT SERPL-MCNC: 0.79 MG/DL (ref 0.5–1.4)
EKG IMPRESSION: NORMAL
EOSINOPHIL # BLD AUTO: 0.18 K/UL (ref 0–0.51)
EOSINOPHIL NFR BLD: 2.7 % (ref 0–6.9)
ERYTHROCYTE [DISTWIDTH] IN BLOOD BY AUTOMATED COUNT: 40.3 FL (ref 35.9–50)
GLOBULIN SER CALC-MCNC: 2.7 G/DL (ref 1.9–3.5)
GLUCOSE SERPL-MCNC: 115 MG/DL (ref 65–99)
HCT VFR BLD AUTO: 41.8 % (ref 37–47)
HGB BLD-MCNC: 14 G/DL (ref 12–16)
IMM GRANULOCYTES # BLD AUTO: 0.02 K/UL (ref 0–0.11)
IMM GRANULOCYTES NFR BLD AUTO: 0.3 % (ref 0–0.9)
LV EJECT FRACT  99904: 70
LV EJECT FRACT MOD 2C 99903: 70.39
LV EJECT FRACT MOD 4C 99902: 67.97
LV EJECT FRACT MOD BP 99901: 69.79
LYMPHOCYTES # BLD AUTO: 2.92 K/UL (ref 1–4.8)
LYMPHOCYTES NFR BLD: 43.8 % (ref 22–41)
MCH RBC QN AUTO: 31.4 PG (ref 27–33)
MCHC RBC AUTO-ENTMCNC: 33.5 G/DL (ref 33.6–35)
MCV RBC AUTO: 93.7 FL (ref 81.4–97.8)
MONOCYTES # BLD AUTO: 0.43 K/UL (ref 0–0.85)
MONOCYTES NFR BLD AUTO: 6.4 % (ref 0–13.4)
NEUTROPHILS # BLD AUTO: 3.08 K/UL (ref 2–7.15)
NEUTROPHILS NFR BLD: 46.2 % (ref 44–72)
NRBC # BLD AUTO: 0 K/UL
NRBC BLD-RTO: 0 /100 WBC
PLATELET # BLD AUTO: 207 K/UL (ref 164–446)
PMV BLD AUTO: 9.5 FL (ref 9–12.9)
POTASSIUM SERPL-SCNC: 4.1 MMOL/L (ref 3.6–5.5)
PROT SERPL-MCNC: 7.2 G/DL (ref 6–8.2)
RBC # BLD AUTO: 4.46 M/UL (ref 4.2–5.4)
SODIUM SERPL-SCNC: 140 MMOL/L (ref 135–145)
TROPONIN T SERPL-MCNC: 7 NG/L (ref 6–19)
TSH SERPL DL<=0.005 MIU/L-ACNC: 4.58 UIU/ML (ref 0.38–5.33)
WBC # BLD AUTO: 6.7 K/UL (ref 4.8–10.8)

## 2020-05-25 PROCEDURE — 85025 COMPLETE CBC W/AUTO DIFF WBC: CPT

## 2020-05-25 PROCEDURE — 93005 ELECTROCARDIOGRAM TRACING: CPT | Performed by: EMERGENCY MEDICINE

## 2020-05-25 PROCEDURE — G0378 HOSPITAL OBSERVATION PER HR: HCPCS

## 2020-05-25 PROCEDURE — 99204 OFFICE O/P NEW MOD 45 MIN: CPT | Performed by: INTERNAL MEDICINE

## 2020-05-25 PROCEDURE — 93306 TTE W/DOPPLER COMPLETE: CPT

## 2020-05-25 PROCEDURE — 99285 EMERGENCY DEPT VISIT HI MDM: CPT

## 2020-05-25 PROCEDURE — 93306 TTE W/DOPPLER COMPLETE: CPT | Mod: 26 | Performed by: INTERNAL MEDICINE

## 2020-05-25 PROCEDURE — A9270 NON-COVERED ITEM OR SERVICE: HCPCS | Performed by: HOSPITALIST

## 2020-05-25 PROCEDURE — 80053 COMPREHEN METABOLIC PANEL: CPT

## 2020-05-25 PROCEDURE — 700102 HCHG RX REV CODE 250 W/ 637 OVERRIDE(OP): Performed by: HOSPITALIST

## 2020-05-25 PROCEDURE — 99220 PR INITIAL OBSERVATION CARE,LEVL III: CPT | Performed by: HOSPITALIST

## 2020-05-25 PROCEDURE — 84484 ASSAY OF TROPONIN QUANT: CPT

## 2020-05-25 PROCEDURE — 84443 ASSAY THYROID STIM HORMONE: CPT

## 2020-05-25 RX ORDER — OMEPRAZOLE 20 MG/1
20 CAPSULE, DELAYED RELEASE ORAL DAILY
Status: DISCONTINUED | OUTPATIENT
Start: 2020-05-25 | End: 2020-05-26 | Stop reason: HOSPADM

## 2020-05-25 RX ORDER — LOSARTAN POTASSIUM 25 MG/1
25 TABLET ORAL 2 TIMES DAILY
Status: DISCONTINUED | OUTPATIENT
Start: 2020-05-25 | End: 2020-05-26 | Stop reason: HOSPADM

## 2020-05-25 RX ORDER — ONDANSETRON 4 MG/1
4 TABLET, ORALLY DISINTEGRATING ORAL EVERY 4 HOURS PRN
Status: DISCONTINUED | OUTPATIENT
Start: 2020-05-25 | End: 2020-05-26 | Stop reason: HOSPADM

## 2020-05-25 RX ORDER — TRAZODONE HYDROCHLORIDE 50 MG/1
50 TABLET ORAL
Status: DISCONTINUED | OUTPATIENT
Start: 2020-05-25 | End: 2020-05-26 | Stop reason: HOSPADM

## 2020-05-25 RX ORDER — ACETAMINOPHEN 325 MG/1
650 TABLET ORAL EVERY 6 HOURS PRN
Status: DISCONTINUED | OUTPATIENT
Start: 2020-05-25 | End: 2020-05-26 | Stop reason: HOSPADM

## 2020-05-25 RX ORDER — ONDANSETRON 2 MG/ML
4 INJECTION INTRAMUSCULAR; INTRAVENOUS EVERY 4 HOURS PRN
Status: DISCONTINUED | OUTPATIENT
Start: 2020-05-25 | End: 2020-05-26 | Stop reason: HOSPADM

## 2020-05-25 RX ADMIN — LOSARTAN POTASSIUM 25 MG: 25 TABLET ORAL at 06:14

## 2020-05-25 RX ADMIN — TRAZODONE HYDROCHLORIDE 50 MG: 50 TABLET ORAL at 21:25

## 2020-05-25 RX ADMIN — LOSARTAN POTASSIUM 25 MG: 25 TABLET ORAL at 18:20

## 2020-05-25 RX ADMIN — OMEPRAZOLE 20 MG: 20 CAPSULE, DELAYED RELEASE ORAL at 06:14

## 2020-05-25 ASSESSMENT — ENCOUNTER SYMPTOMS
BRUISES/BLEEDS EASILY: 0
MUSCULOSKELETAL NEGATIVE: 1
RESPIRATORY NEGATIVE: 1
LOSS OF CONSCIOUSNESS: 0
PSYCHIATRIC NEGATIVE: 1
FEVER: 0
DIZZINESS: 0
NERVOUS/ANXIOUS: 1
DEPRESSION: 0
MYALGIAS: 0
BACK PAIN: 0
EYES NEGATIVE: 1
NAUSEA: 0
WEAKNESS: 0
PALPITATIONS: 1
COUGH: 0
VOMITING: 0
FLANK PAIN: 0
GASTROINTESTINAL NEGATIVE: 1
CARDIOVASCULAR NEGATIVE: 1
WEIGHT LOSS: 0
CONSTITUTIONAL NEGATIVE: 1
ABDOMINAL PAIN: 0
NEUROLOGICAL NEGATIVE: 1
SHORTNESS OF BREATH: 1
CHILLS: 0
INSOMNIA: 1

## 2020-05-25 ASSESSMENT — COGNITIVE AND FUNCTIONAL STATUS - GENERAL
MOBILITY SCORE: 24
SUGGESTED CMS G CODE MODIFIER MOBILITY: CH
DAILY ACTIVITIY SCORE: 24
SUGGESTED CMS G CODE MODIFIER DAILY ACTIVITY: CH

## 2020-05-25 ASSESSMENT — LIFESTYLE VARIABLES
TOTAL SCORE: 0
EVER HAD A DRINK FIRST THING IN THE MORNING TO STEADY YOUR NERVES TO GET RID OF A HANGOVER: NO
TOTAL SCORE: 0
EVER_SMOKED: NEVER
CONSUMPTION TOTAL: NEGATIVE
TOTAL SCORE: 0
DO YOU DRINK ALCOHOL: NO
AVERAGE NUMBER OF DAYS PER WEEK YOU HAVE A DRINK CONTAINING ALCOHOL: 0
CONSUMPTION TOTAL: INCOMPLETE
TOTAL SCORE: 0
HOW MANY TIMES IN THE PAST YEAR HAVE YOU HAD 5 OR MORE DRINKS IN A DAY: 0
TOTAL SCORE: 0
HAVE YOU EVER FELT YOU SHOULD CUT DOWN ON YOUR DRINKING: NO
HAVE PEOPLE ANNOYED YOU BY CRITICIZING YOUR DRINKING: NO
EVER HAD A DRINK FIRST THING IN THE MORNING TO STEADY YOUR NERVES TO GET RID OF A HANGOVER: NO
EVER FELT BAD OR GUILTY ABOUT YOUR DRINKING: NO
EVER FELT BAD OR GUILTY ABOUT YOUR DRINKING: NO
HAVE YOU EVER FELT YOU SHOULD CUT DOWN ON YOUR DRINKING: NO
ALCOHOL_USE: NO
ON A TYPICAL DAY WHEN YOU DRINK ALCOHOL HOW MANY DRINKS DO YOU HAVE: 0
EVER_SMOKED: NEVER
ALCOHOL_USE: NO
TOTAL SCORE: 0
DO YOU DRINK ALCOHOL: NO
HAVE PEOPLE ANNOYED YOU BY CRITICIZING YOUR DRINKING: NO

## 2020-05-25 ASSESSMENT — FIBROSIS 4 INDEX
FIB4 SCORE: 2.03
FIB4 SCORE: 1.99

## 2020-05-25 ASSESSMENT — PATIENT HEALTH QUESTIONNAIRE - PHQ9
2. FEELING DOWN, DEPRESSED, IRRITABLE, OR HOPELESS: NOT AT ALL
SUM OF ALL RESPONSES TO PHQ9 QUESTIONS 1 AND 2: 0
1. LITTLE INTEREST OR PLEASURE IN DOING THINGS: NOT AT ALL

## 2020-05-25 NOTE — ASSESSMENT & PLAN NOTE
Pt with moderate obsructive sleep apnea but had 33% central events during the study and with CPAP and BIPAP had worsening treatment emergent sleep apnea.  Unable to tolerate ASV  Waking up with palpitations and concerning re: arrythmias at night    Sleep center is closed and can call on Monday to get her in urgently with sleep physician to reassess.  Due to her study being > 6 months old, insurance wont cover the cost of the ASV if she was to get one and also patient states that she is unable to sleep with it

## 2020-05-25 NOTE — CONSULTS
Pulmonary Consultation    Date of consult: 5/25/2020    Referring Physician  Ruchi Leon D.O.    Reason for Consultation  Complex sleep apnea    History of Presenting Illness  74 y.o. female who presented 5/25/2020 with complex sleep apnea   Moderate obstructive sleep apnea with BEBETO of 19/7/hr owrse in supine with BEBETO 32/hr. PSG showedPSG showed mild obstructive and central respiratory events noted. The overall AHI was 14.7  events per hour comprised of 29% hypopneas, 38% obstructive apneas and 33% central apneas      Last seen by Dr. Mason in the sleep lab on Respironics ASV with an expiratory pressure range of 7 to 15 cm water, pressure support at 5 to 20 cm of water and a maximum pressure of 25 cm water recommended on 8/2019. When seen by Dr. Mason on . AHI improved to 4.6/hr and O2 kwadwo 86% when she did use it.  Patient stopped using ASV 2 months after getting it as she was not using it as unable to sleep with it.  Pt was tried on BIPAP but developed treatment emergent sleep apnea    She was referred to [facility] for [facility] (Surgical implanted device that triggers the hypoglossal nerve resulting in tightenig of the tongue and upper airway to maintain patency and overall improving VALERIE)    Code Status  Full Code    Review of Systems  Review of Systems   Constitutional: Positive for malaise/fatigue.   HENT: Negative.    Eyes: Negative.    Respiratory: Negative.    Cardiovascular: Positive for palpitations.   Gastrointestinal: Negative.    Genitourinary: Negative.    Musculoskeletal: Negative.    Skin: Negative.    Neurological: Negative.    Endo/Heme/Allergies: Negative.    Psychiatric/Behavioral: Negative.    All other systems reviewed and are negative.      Past Medical History   has a past medical history of Abnormal thyroid function test (9/10/2014), Allergic rhinitis, Anxiety, Asthma, Back pain, Basal cell carcinoma, Bronchitis, Cardiac arrhythmia, Depression, Elevated liver enzymes (9/10/2014), Family  history of melanoma, GERD (gastroesophageal reflux disease), Glaucoma suspect of both eyes, Hyperlipidemia, Hypertension, Nasal drainage, OCD (obsessive compulsive disorder), Pancreatitis, Parathyroid disorder (HCC), PCOS (polycystic ovarian syndrome), S/P ERCP, and Transient global amnesia.    Surgical History   has a past surgical history that includes cholecystectomy; tonsillectomy; lumpectomy; abdominal hysterectomy total; cardiac cath, right heart; eye surgery; us-needle core bx-breast panel; hysterectomy laparoscopy; dilation and curettage; and adenoidectomy.    Family History  family history includes Cancer in her father and paternal grandfather; Diabetes in her paternal grandfather; Heart Attack in her maternal grandfather; Heart Disease in her father and paternal grandfather; Hypertension in her mother and sister; Other in her maternal grandmother; Sleep Apnea in her sister and sister; Stroke in her maternal grandfather, paternal grandfather, paternal grandmother, and sister.    Social History   reports that she has never smoked. She has never used smokeless tobacco. She reports that she does not drink alcohol or use drugs.    Medications  Home Medications     Reviewed by Cordell Phillips (Pharmacy Tech) on 05/25/20 at 0835  Med List Status: Complete   Medication Last Dose Status   Cholecalciferol (VITAMIN D) 2000 UNIT Tab UNK Active   losartan (COZAAR) 25 MG Tab UNK Active   omeprazole (PRILOSEC) 10 MG CAPSULE DELAYED RELEASE UNK Active   Polyvinyl Alcohol-Povidone (REFRESH OP) UNK Active              Current Facility-Administered Medications   Medication Dose Route Frequency Provider Last Rate Last Dose   • losartan (COZAAR) tablet 25 mg  25 mg Oral BID Lori Davis M.D.   25 mg at 05/25/20 0614   • omeprazole (PRILOSEC) capsule 20 mg  20 mg Oral DAILY Lori Davis M.D.   20 mg at 05/25/20 0614   • traZODone (DESYREL) tablet 50 mg  50 mg Oral QHS PRN Lori Davis M.D.       • acetaminophen  (TYLENOL) tablet 650 mg  650 mg Oral Q6HRS PRN Lori Davis M.D.       • ondansetron (ZOFRAN) syringe/vial injection 4 mg  4 mg Intravenous Q4HRS PRN Lori Davis M.D.       • ondansetron (ZOFRAN ODT) dispertab 4 mg  4 mg Oral Q4HRS PRN Lori Davis M.D.           Allergies  Allergies   Allergen Reactions   • Ace Inhibitors      Cough   • Ceclor [Cefaclor] Hives   • Ceftin Hives   • Cephalosporins Rash   • Ciprofloxacin Rash   • Kenalog Hives   • Lamisil [Terbinafine Hcl]    • Latex Rash   • Merthiolate [Thimerosal] Hives   • Monistat [Tioconazole] Itching   • Pcn [Penicillins] Hives   • Benzalkonium Chloride Rash   • Tetracyclines Rash       Vital Signs last 24 hours  Temp:  [36.4 °C (97.5 °F)-36.6 °C (97.9 °F)] 36.4 °C (97.5 °F)  Pulse:  [57-62] 60  Resp:  [18-24] 18  BP: (136-185)/(77-98) 139/83  SpO2:  [93 %-96 %] 93 %    Physical Exam  Physical Exam  Constitutional:       Appearance: Normal appearance.   HENT:      Head: Normocephalic and atraumatic.      Nose: Nose normal.   Eyes:      Extraocular Movements: Extraocular movements intact.      Pupils: Pupils are equal, round, and reactive to light.   Neck:      Musculoskeletal: Normal range of motion.   Cardiovascular:      Rate and Rhythm: Normal rate.   Pulmonary:      Effort: Pulmonary effort is normal.      Breath sounds: Normal breath sounds.   Abdominal:      General: Abdomen is flat.      Palpations: Abdomen is soft.   Musculoskeletal: Normal range of motion.   Skin:     General: Skin is warm and dry.   Neurological:      General: No focal deficit present.      Mental Status: She is alert and oriented to person, place, and time.   Psychiatric:         Mood and Affect: Mood normal.         Behavior: Behavior normal.         Thought Content: Thought content normal.         Judgment: Judgment normal.         Fluids  No intake or output data in the 24 hours ending 05/25/20 1112    Laboratory  Labs:  Recent Labs     05/23/20  1517 05/25/20  0300   WBC  7.3 6.7   RBC 4.54 4.46   HEMOGLOBIN 14.5 14.0   HEMATOCRIT 42.6 41.8   MCV 93.8 93.7   MCH 31.9 31.4   MCHC 34.0 33.5*   RDW 39.7 40.3   PLATELETCT 209 207   MPV 9.4 9.5                 Recent Labs     05/23/20  1517 05/25/20  0300   SODIUM 139 140   POTASSIUM 4.4 4.1   CHLORIDE 104 102   CO2 23 24   GLUCOSE 118* 115*   BUN 12 16     Recent Labs     05/23/20  1517 05/25/20  0300   SODIUM 139 140   POTASSIUM 4.4 4.1   CHLORIDE 104 102   CO2 23 24   BUN 12 16   CREATININE 0.75 0.79   CALCIUM 9.6 9.6     No results found for this or any previous visit.      Imaging:   EC-ECHOCARDIOGRAM COMPLETE W/O CONT    (Results Pending)       Imaging  Personally viewed no I or E    Assessment/Plan  Complex sleep apnea syndrome- (present on admission)  Assessment & Plan  Pt with moderate obsructive sleep apnea but had 33% central events during the study and with CPAP and BIPAP had worsening treatment emergent sleep apnea.  Unable to tolerate ASV  Waking up with palpitations and concerning re: arrythmias at night    Sleep center is closed and can call on Monday to get her in urgently with sleep physician to reassess.  Due to her study being > 6 months old, insurance wont cover the cost of the ASV if she was to get one and also patient states that she is unable to sleep with it       All the above explained to the patient who understands and will wait to see if she can get in on an urgent need this week with sleep provider

## 2020-05-25 NOTE — PROGRESS NOTES
Telemetry Shift Summary    Rhythm SR/SB  HR Range 56-66  Ectopy rPVC  Measurements 0.14/0.08/0.44        Normal Values  Rhythm SR  HR Range    Measurements 0.12-0.20 / 0.06-0.10  / 0.30-0.52

## 2020-05-25 NOTE — ASSESSMENT & PLAN NOTE
Observe on telemetry  Patient not under treatment and having severe symptoms  Monitor  Consult pulmonary service to facilitate obtaining new machine and getting back into clinic.  Supplemental oxygen prn  Continuous pulse oximetry  Trazodone at night to facilitate sleep, keep overnight and observe on pulse ox

## 2020-05-25 NOTE — PROGRESS NOTES
Report received from LORE Esposito. Pt denies needs at this time. Safety precautions in place. Call light within reach.

## 2020-05-25 NOTE — ED PROVIDER NOTES
"ED Provider Note      Means of Arrival: Private vehicle  History obtained from: Patient, medical record      CHIEF COMPLAINT  Chief Complaint   Patient presents with   • Palpitations       HPI  Martha Ordonez is a 74 y.o. female who presents with palpitations.  The patient reports that she was seen here 2 days ago for similar symptoms.  She reports today that she woke from sleep with severe shortness of breath, racing heart, describing it as beating out of her chest \".  She denies any chest pain with this.  She went to the bathroom and noted that she was completely pale.  She denies cyanosis with this.  She also reported bilateral arm paresthesias when she awoke which gradually have improved.  She denies any palpitations at this time.  Denies any history of thyroid issues, leg swelling, fevers, cough, current shortness of breath.  She was seen here 2 days ago, had a negative cardiac work-up, and was sent home with nasal cannula oxygen for sleep apnea.  The patient reports she does have a history of central sleep apnea and has been tried on a CPAP machine, which did not function appropriately.  She no longer has the machine.  She reports that her episodes only occur when she is awaking from sleeping.  She had a similar episode several days ago and had a sensation of impending doom with this.  She denies any lightheadedness, loss of consciousness.    She reports that her father had a history of ventricular tachycardia requiring pacemaker, another family member has atrial fibrillation, and has several second to third-degree relatives who had sudden unexplained deaths.    REVIEW OF SYSTEMS  CONSTITUTIONAL:  No fever.  CARDIOVASCULAR:   See HPI  RESPIRATORY:   See HPI  GASTROINTESTINAL:  No abdominal pain.  GENITOURINARY:   No dysuria.  MUSCULOSKELETAL:  No arthralgia.  SKIN:  No rash or suspicious lesions.  NEUROLOGIC:   No headache.  ENDOCRINE:  No facial edema.  HEMATOLOGIC:  No abnormal bleeding.       See HPI for " further details.   All other systems are negative.     PAST MEDICAL HISTORY  Past Medical History:   Diagnosis Date   • Abnormal thyroid function test 9/10/2014   • Allergic rhinitis    • Anxiety    • Asthma    • Back pain    • Basal cell carcinoma     squamos cell right ear   • Bronchitis    • Cardiac arrhythmia    • Depression    • Elevated liver enzymes 9/10/2014   • Family history of melanoma    • GERD (gastroesophageal reflux disease)    • Glaucoma suspect of both eyes    • Hyperlipidemia    • Hypertension    • Nasal drainage    • OCD (obsessive compulsive disorder)    • Pancreatitis    • Parathyroid disorder (HCC)    • PCOS (polycystic ovarian syndrome)    • S/P ERCP    • Transient global amnesia        FAMILY HISTORY  Family History   Problem Relation Age of Onset   • Hypertension Mother    • Heart Disease Father         CHF   • Cancer Father         melanoma   • Hypertension Sister    • Stroke Sister    • Sleep Apnea Sister    • Cancer Paternal Grandfather         colon   • Heart Disease Paternal Grandfather    • Stroke Paternal Grandfather    • Diabetes Paternal Grandfather    • Other Maternal Grandmother         eplepsey   • Heart Attack Maternal Grandfather    • Stroke Maternal Grandfather    • Stroke Paternal Grandmother    • Sleep Apnea Sister        SOCIAL HISTORY   reports that she has never smoked. She has never used smokeless tobacco. She reports that she does not drink alcohol or use drugs.    SURGICAL HISTORY  Past Surgical History:   Procedure Laterality Date   • ABDOMINAL HYSTERECTOMY TOTAL      ovaries remain   • ADENOIDECTOMY     • CARDIAC CATH, RIGHT HEART      normal, EKG was Abnormal    • CHOLECYSTECTOMY     • DILATION AND CURETTAGE     • EYE SURGERY      b/l iridotomy   • HYSTERECTOMY LAPAROSCOPY     • LUMPECTOMY      benign   • TONSILLECTOMY     • US-NEEDLE CORE BX-BREAST PANEL         CURRENT MEDICATIONS  Home Medications     Reviewed by Lj Shetty R.N. (Registered Nurse) on 05/25/20  "at 0259  Med List Status: Complete   Medication Last Dose Status   azithromycin (AZASITE) 1 % ophthalmic solution  Active   Cholecalciferol (VITAMIN D) 2000 UNIT Tab  Active   losartan (COZAAR) 25 MG Tab  Active   omeprazole (PRILOSEC) 10 MG CAPSULE DELAYED RELEASE  Active   Polyvinyl Alcohol-Povidone (REFRESH OP)  Active                ALLERGIES  Allergies   Allergen Reactions   • Ace Inhibitors      Cough   • Ceclor [Cefaclor] Hives   • Ceftin Hives   • Cephalosporins Rash   • Ciprofloxacin Rash   • Kenalog Hives   • Lamisil [Terbinafine Hcl]    • Latex Rash   • Merthiolate [Thimerosal] Hives   • Monistat [Tioconazole] Itching   • Pcn [Penicillins] Hives   • Benzalkonium Chloride Rash   • Tetracyclines Rash       PHYSICAL EXAM  VITAL SIGNS: BP (!) 183/83   Pulse 60   Temp 36.6 °C (97.9 °F) (Tympanic)   Resp 18   Ht 1.651 m (5' 5\")   Wt 77.3 kg (170 lb 6.7 oz)   LMP  (LMP Unknown)   SpO2 96%   BMI 28.36 kg/m²    Con: Comfortable  HENT: Normocephalic, atraumatic, Oropharynx within normal limits  Eyes: Pupils equal, round and reactive to light, Extraocular muscles intact  Resp: Clear to auscultation, no wheezes, rales or crackles  CV: Regular Rate and Rhythm, Normal first and second heart sounds, no murmurs, rubs or gallups.  Abd: Soft, Nontender, Nondistended, no rebound or guarding.  : No costovertebral angle tenderness  MSK: No deformities, no calf tenderness.  No pedal edema  Skin: No rash, Warm and dry, No petechiae  Neuro: Speech fluent  Psych: Normal mood/mentation      RADIOLOGY/PROCEDURES  No orders to display       LABS  Labs Reviewed   CBC WITH DIFFERENTIAL - Abnormal; Notable for the following components:       Result Value    MCHC 33.5 (*)     Lymphocytes 43.80 (*)     All other components within normal limits   COMP METABOLIC PANEL   TSH   TROPONIN        EKG  Results for orders placed or performed during the hospital encounter of 05/25/20   EKG (NOW)   Result Value Ref Range    Report       " [facility]    Test Date:  2020  Pt Name:    DEB BANGURA                 Department: [facility]  MRN:        [id number]                      Room:  Gender:     Female                       Technician: CAMRYN  :        [id number]                   Requested By:LUDMILA DUCKWORTH  Order #:    [id number]                    Reading MD: Ludmila Duckworth    Measurements  Intervals                                Axis  Rate:       59                           P:          62  NC:         140                          QRS:        -46  QRSD:       92                           T:  QT:         428  QTc:        424    Interpretive Statements  SINUS BRADYCARDIA  LEFT ANTERIOR FASCICULAR BLOCK  LATE PRECORDIAL R/S TRANSITION  BORDERLINE REPOLARIZATION ABNORMALITY  Compared to ECG 2020 15:03:20  Sinus rhythm no longer present  Electronically Signed On 2020 3:21:10 PDT by Ludmila Duckworth          COURSE & MEDICAL DECISION MAKING  Pertinent Labs & Imaging studies reviewed. (See chart for details)    Review the patient's medical record demonstrates that she was seen here 2 days ago for difficulty sleeping, anxiety when waking up and feeling like she was going to die.  She had a work-up showing normal troponin, reassuring EKG, normal chest x-ray.  There was concern for sleep apnea and she was arranged for nighttime oxygen.    Patient reports that despite her nighttime oxygen, she continued to have episodes.  She did not have any lightheadedness, syncope, presyncope.  This appears to fit most likely with sleep apnea causing her symptoms, however the patient reports multiple family members with dysrhythmias.  She has no stigmata of DVT/PE.  No evidence of ACS with her presentation.  Benign abdominal examination.  She demonstrates no neurologic abnormalities.  Low suspicion for drug abuse.  Will check a TSH for possible thyroid dysfunction.  EKG demonstrates no preexcitation, Brugada syndrome, aVR D, or other  high risk features.  Patient and family concerned about dysrhythmia and request hospitalization. Given some concern of dysrhythmia, will plan to have the patient hospitalized overnight for telemetry monitoring.    Case discussed with  who accepts hospitalization. Pt will be hospitalized in guarded condition    Appropriate PPE were worn at this encounter.     FINAL IMPRESSION  1. Palpitations    2. Shortness of breath

## 2020-05-25 NOTE — H&P
"Hospital Medicine History & Physical Note    Date of Service  5/25/2020    Primary Care Physician  Sabra Arguello M.D.    Code Status  Full Code    Chief Complaint  Chief Complaint   Patient presents with   • Palpitations       History of Presenting Illness  74 y.o. female who presented 5/25/2020 having awakened from sleep with bilateral arm numbness, pallor and a feeling of impending doom. Patient has been diagnosed with central sleep apnea, the notes from the sleep clinic are available under the chart review tab to read. In short, she was put on an ASV device but was unable to get much success with it and ultimately sent it back. Dr. Mason was to refer her to [place] but there was a delay in the referral, she does have an appointment for October. Patient felt like she was doing ok for a couple of months but now for about the past 8 weeks she does not feel like she is getting adequate rest and feels a sense \"like I'm going to die soon\". Over the weekend her daughter (who is a nurse) was able to get her oxygen by nasal cannula and the first night she did well and slept for seven straight hours. Then this morning at 2 am she awakened with the above sensation. There is family history of afib and vtach, patient was concerned that she might be having cardiac issues and so came in for evaluation. Denies chest pain.    I discussed the presenting symptoms, physical examination, lab and radiological study results with the emergency department physician.      Review of Systems  Review of Systems   Constitutional: Negative.  Negative for chills, fever, malaise/fatigue and weight loss.   HENT: Negative.    Respiratory: Positive for shortness of breath. Negative for cough.    Cardiovascular: Negative.  Negative for chest pain and leg swelling.   Gastrointestinal: Negative.  Negative for abdominal pain, nausea and vomiting.   Genitourinary: Negative.  Negative for dysuria and flank pain.   Musculoskeletal: Negative.  Negative " for back pain and myalgias.   Neurological: Negative.  Negative for dizziness, loss of consciousness and weakness.   Endo/Heme/Allergies: Negative.  Does not bruise/bleed easily.   Psychiatric/Behavioral: Negative for depression. The patient is nervous/anxious and has insomnia.    All other systems reviewed and are negative.      Past Medical History   has a past medical history of Abnormal thyroid function test (9/10/2014), Allergic rhinitis, Anxiety, Asthma, Back pain, Basal cell carcinoma, Bronchitis, Cardiac arrhythmia, Depression, Elevated liver enzymes (9/10/2014), Family history of melanoma, GERD (gastroesophageal reflux disease), Glaucoma suspect of both eyes, Hyperlipidemia, Hypertension, Nasal drainage, OCD (obsessive compulsive disorder), Pancreatitis, Parathyroid disorder (HCC), PCOS (polycystic ovarian syndrome), S/P ERCP, and Transient global amnesia.    Surgical History   has a past surgical history that includes cholecystectomy; tonsillectomy; lumpectomy; abdominal hysterectomy total; cardiac cath, right heart; eye surgery; us-needle core bx-breast panel; hysterectomy laparoscopy; dilation and curettage; and adenoidectomy.     Family History  family history includes Cancer in her father and paternal grandfather; Diabetes in her paternal grandfather; Heart Attack in her maternal grandfather; Heart Disease in her father and paternal grandfather; Hypertension in her mother and sister; Other in her maternal grandmother; Sleep Apnea in her sister and sister; Stroke in her maternal grandfather, paternal grandfather, paternal grandmother, and sister.     Social History   reports that she has never smoked. She has never used smokeless tobacco. She reports that she does not drink alcohol or use drugs.    Allergies  Allergies   Allergen Reactions   • Ace Inhibitors      Cough   • Ceclor [Cefaclor] Hives   • Ceftin Hives   • Cephalosporins Rash   • Ciprofloxacin Rash   • Kenalog Hives   • Lamisil [Terbinafine  Hcl]    • Latex Rash   • Merthiolate [Thimerosal] Hives   • Monistat [Tioconazole] Itching   • Pcn [Penicillins] Hives   • Benzalkonium Chloride Rash   • Tetracyclines Rash       Medications  Prior to Admission Medications   Prescriptions Last Dose Informant Patient Reported? Taking?   Cholecalciferol (VITAMIN D) 2000 UNIT Tab  Patient Yes No   Sig: Take 4,000 Units by mouth.   Polyvinyl Alcohol-Povidone (REFRESH OP)  Patient Yes No   Sig: Place 1 Drop in both eyes 4 times a day as needed.   azithromycin (AZASITE) 1 % ophthalmic solution  Patient Yes No   Sig: Place 1 Drop in both eyes 2 times a day. 30 day 5/7/2020   losartan (COZAAR) 25 MG Tab  Patient No No   Sig: Take 1 Tab by mouth 2 Times a Day.   omeprazole (PRILOSEC) 10 MG CAPSULE DELAYED RELEASE  Patient No No   Sig: Take 1 Cap by mouth 2 times a day.      Facility-Administered Medications: None       Physical Exam  Temp:  [36.6 °C (97.9 °F)] 36.6 °C (97.9 °F)  Pulse:  [60] 60  Resp:  [18] 18  BP: (161-183)/(81-83) 161/81  SpO2:  [94 %-96 %] 94 %    Physical Exam  Vitals signs and nursing note reviewed.   Constitutional:       General: She is not in acute distress.     Appearance: She is well-developed. She is not diaphoretic.   HENT:      Right Ear: External ear normal.      Left Ear: External ear normal.      Nose: Nose normal.      Mouth/Throat:      Pharynx: No oropharyngeal exudate.   Eyes:      General: No scleral icterus.        Right eye: No discharge.         Left eye: No discharge.      Conjunctiva/sclera: Conjunctivae normal.   Neck:      Vascular: No JVD.      Trachea: No tracheal deviation.   Cardiovascular:      Rate and Rhythm: Normal rate and regular rhythm.      Heart sounds: Normal heart sounds.   Pulmonary:      Effort: Pulmonary effort is normal. No respiratory distress.      Breath sounds: Normal breath sounds. No stridor. No wheezing or rales.   Chest:      Chest wall: No tenderness.   Abdominal:      General: Bowel sounds are normal.  There is no distension.      Palpations: Abdomen is soft.      Tenderness: There is no abdominal tenderness.   Musculoskeletal:         General: No tenderness.   Skin:     General: Skin is warm and dry.      Coloration: Skin is not pale.   Neurological:      Mental Status: She is alert.      Cranial Nerves: No cranial nerve deficit.      Motor: No abnormal muscle tone.   Psychiatric:         Mood and Affect: Mood is anxious.         Behavior: Behavior normal.         Laboratory:  Recent Labs     05/23/20  1517 05/25/20  0300   WBC 7.3 6.7   RBC 4.54 4.46   HEMOGLOBIN 14.5 14.0   HEMATOCRIT 42.6 41.8   MCV 93.8 93.7   MCH 31.9 31.4   MCHC 34.0 33.5*   RDW 39.7 40.3   PLATELETCT 209 207   MPV 9.4 9.5     Recent Labs     05/23/20 1517 05/25/20  0300   SODIUM 139 140   POTASSIUM 4.4 4.1   CHLORIDE 104 102   CO2 23 24   GLUCOSE 118* 115*   BUN 12 16   CREATININE 0.75 0.79   CALCIUM 9.6 9.6     Recent Labs     05/23/20  1517 05/25/20  0300   ALTSGPT 17 18   ASTSGOT 24 24   ALKPHOSPHAT 137* 140*   TBILIRUBIN 1.0 0.8   GLUCOSE 118* 115*         No results for input(s): NTPROBNP in the last 72 hours.      Recent Labs     05/23/20 1517 05/25/20  0300   TROPONINT <6 7       Imaging:  EC-ECHOCARDIOGRAM COMPLETE W/O CONT    (Results Pending)         Assessment/Plan:      * Primary central sleep apnea- (present on admission)  Assessment & Plan  Observe on telemetry  Patient not under treatment and having severe symptoms  Monitor  Consult pulmonary service to facilitate obtaining new machine and getting back into clinic.  Supplemental oxygen prn  Continuous pulse oximetry  Trazodone at night to facilitate sleep, keep overnight and observe on pulse ox    Gastroesophageal reflux disease without esophagitis- (present on admission)  Assessment & Plan  Continue prilosec.    Essential hypertension- (present on admission)  Assessment & Plan  Continue losartan.

## 2020-05-25 NOTE — ED TRIAGE NOTES
Awoken at 2am with palpations and suellen arm numbness. Pt talkative. Pt reports hx of sleep apnea though does not use cpap

## 2020-05-26 ENCOUNTER — PATIENT MESSAGE (OUTPATIENT)
Dept: SLEEP MEDICINE | Facility: MEDICAL CENTER | Age: 75
End: 2020-05-26

## 2020-05-26 ENCOUNTER — APPOINTMENT (OUTPATIENT)
Dept: RADIOLOGY | Facility: MEDICAL CENTER | Age: 75
End: 2020-05-26
Attending: NURSE PRACTITIONER
Payer: MEDICARE

## 2020-05-26 VITALS
TEMPERATURE: 97.6 F | HEART RATE: 60 BPM | WEIGHT: 169.75 LBS | BODY MASS INDEX: 28.28 KG/M2 | OXYGEN SATURATION: 95 % | DIASTOLIC BLOOD PRESSURE: 70 MMHG | HEIGHT: 65 IN | SYSTOLIC BLOOD PRESSURE: 126 MMHG | RESPIRATION RATE: 18 BRPM

## 2020-05-26 DIAGNOSIS — G47.33 OSA (OBSTRUCTIVE SLEEP APNEA): ICD-10-CM

## 2020-05-26 PROCEDURE — G0378 HOSPITAL OBSERVATION PER HR: HCPCS

## 2020-05-26 PROCEDURE — 700102 HCHG RX REV CODE 250 W/ 637 OVERRIDE(OP): Performed by: HOSPITALIST

## 2020-05-26 PROCEDURE — A9270 NON-COVERED ITEM OR SERVICE: HCPCS | Performed by: HOSPITALIST

## 2020-05-26 PROCEDURE — 99217 PR OBSERVATION CARE DISCHARGE: CPT | Performed by: INTERNAL MEDICINE

## 2020-05-26 RX ORDER — ZOLPIDEM TARTRATE 5 MG/1
5 TABLET ORAL NIGHTLY PRN
Qty: 2 TAB | Refills: 0 | Status: SHIPPED | OUTPATIENT
Start: 2020-05-26 | End: 2020-05-27

## 2020-05-26 RX ORDER — TRAZODONE HYDROCHLORIDE 50 MG/1
50 TABLET ORAL
Qty: 30 TAB | Refills: 3 | Status: SHIPPED | OUTPATIENT
Start: 2020-05-26 | End: 2020-05-26

## 2020-05-26 RX ADMIN — LOSARTAN POTASSIUM 25 MG: 25 TABLET ORAL at 06:06

## 2020-05-26 RX ADMIN — OMEPRAZOLE 20 MG: 20 CAPSULE, DELAYED RELEASE ORAL at 06:06

## 2020-05-26 NOTE — PROGRESS NOTES
Telemetry Shift Summary    Rhythm SR  HR Range 60s  Ectopy R-PVC  Measurements 0.08/0.10/0.40        Normal Values  Rhythm SR  HR Range    Measurements 0.12-0.20 / 0.06-0.10  / 0.30-0.52

## 2020-05-26 NOTE — CARE PLAN
Problem: Communication  Goal: The ability to communicate needs accurately and effectively will improve  Outcome: PROGRESSING AS EXPECTED  Intervention: Educate patient and significant other/support system about the plan of care, procedures, treatments, medications and allow for questions  Note: Pt updated on POC for cardiac monitoring and waiting MD rounds, verbalized understanding. Denies questions. Able to make needs known.      Problem: Safety  Goal: Will remain free from falls  Outcome: PROGRESSING AS EXPECTED  Intervention: Implement fall precautions  Flowsheets  Taken 5/26/2020 0901  Bed Alarm: Alarm Not On  Taken 5/26/2020 0800  Environmental Precautions:   Treaded Slipper Socks on Patient   Personal Belongings, Wastebasket, Call Bell etc. in Easy Reach   Transferred to Stronger Side   Report Given to Other Health Care Providers Regarding Fall Risk   Bed in Low Position   Communication Sign for Patients & Families   Mobility Assessed & Appropriate Sign Placed  Note: OOB independently with supervision, gait steady. Bed in lowest position, wheels locked. Call bell within reach, calls appropriately for assistance. Fall prevention education provided, verbalized understanding. Hourly rounding. Fall/safety precautions in place. Pt remains free from fall/injury at this time.       Problem: Discharge Barriers/Planning  Goal: Patient's continuum of care needs will be met  Outcome: PROGRESSING AS EXPECTED

## 2020-05-26 NOTE — DISCHARGE INSTRUCTIONS
Discharge Instructions per Maryam Rizzo M.D.    Follow up for MRI today as scheduled  Follow up with Sleep Dr tomorrow as scheduled    DIET: regular    ACTIVITY: regular    DIAGNOSIS: Mixed Sleep apnea disorder with Acute respiratory failure/ decompensation and arrhythmia    Return to ER if recurrent issues        Sleep Apnea  Sleep apnea is a condition in which breathing pauses or becomes shallow during sleep. Episodes of sleep apnea usually last 10 seconds or longer, and they may occur as many as 20 times an hour. Sleep apnea disrupts your sleep and keeps your body from getting the rest that it needs. This condition can increase your risk of certain health problems, including:  · Heart attack.  · Stroke.  · Obesity.  · Diabetes.  · Heart failure.  · Irregular heartbeat.  There are three kinds of sleep apnea:  · Obstructive sleep apnea. This kind is caused by a blocked or collapsed airway.  · Central sleep apnea. This kind happens when the part of the brain that controls breathing does not send the correct signals to the muscles that control breathing.  · Mixed sleep apnea. This is a combination of obstructive and central sleep apnea.  What are the causes?  The most common cause of this condition is a collapsed or blocked airway. An airway can collapse or become blocked if:  · Your throat muscles are abnormally relaxed.  · Your tongue and tonsils are larger than normal.  · You are overweight.  · Your airway is smaller than normal.  What increases the risk?  This condition is more likely to develop in people who:  · Are overweight.  · Smoke.  · Have a smaller than normal airway.  · Are elderly.  · Are male.  · Drink alcohol.  · Take sedatives or tranquilizers.  · Have a family history of sleep apnea.  What are the signs or symptoms?  Symptoms of this condition include:  · Trouble staying asleep.  · Daytime sleepiness and tiredness.  · Irritability.  · Loud snoring.  · Morning headaches.  · Trouble  concentrating.  · Forgetfulness.  · Decreased interest in sex.  · Unexplained sleepiness.  · Mood swings.  · Personality changes.  · Feelings of depression.  · Waking up often during the night to urinate.  · Dry mouth.  · Sore throat.  How is this diagnosed?  This condition may be diagnosed with:  · A medical history.  · A physical exam.  · A series of tests that are done while you are sleeping (sleep study). These tests are usually done in a sleep lab, but they may also be done at home.  How is this treated?  Treatment for this condition aims to restore normal breathing and to ease symptoms during sleep. It may involve managing health issues that can affect breathing, such as high blood pressure or obesity. Treatment may include:  · Sleeping on your side.  · Using a decongestant if you have nasal congestion.  · Avoiding the use of depressants, including alcohol, sedatives, and narcotics.  · Losing weight if you are overweight.  · Making changes to your diet.  · Quitting smoking.  · Using a device to open your airway while you sleep, such as:  ¨ An oral appliance. This is a custom-made mouthpiece that shifts your lower jaw forward.  ¨ A continuous positive airway pressure (CPAP) device. This device delivers oxygen to your airway through a mask.  ¨ A nasal expiratory positive airway pressure (EPAP) device. This device has valves that you put into each nostril.  ¨ A bi-level positive airway pressure (BPAP) device. This device delivers oxygen to your airway through a mask.  · Surgery if other treatments do not work. During surgery, excess tissue is removed to create a wider airway.  It is important to get treatment for sleep apnea. Without treatment, this condition can lead to:  · High blood pressure.  · Coronary artery disease.  · (Men) An inability to achieve or maintain an erection (impotence).  · Reduced thinking abilities.  Follow these instructions at home:  · Make any lifestyle changes that your health care  provider recommends.  · Eat a healthy, well-balanced diet.  · Take over-the-counter and prescription medicines only as told by your health care provider.  · Avoid using depressants, including alcohol, sedatives, and narcotics.  · Take steps to lose weight if you are overweight.  · If you were given a device to open your airway while you sleep, use it only as told by your health care provider.  · Do not use any tobacco products, such as cigarettes, chewing tobacco, and e-cigarettes. If you need help quitting, ask your health care provider.  · Keep all follow-up visits as told by your health care provider. This is important.  Contact a health care provider if:  · The device that you received to open your airway during sleep is uncomfortable or does not seem to be working.  · Your symptoms do not improve.  · Your symptoms get worse.  Get help right away if:  · You develop chest pain.  · You develop shortness of breath.  · You develop discomfort in your back, arms, or stomach.  · You have trouble speaking.  · You have weakness on one side of your body.  · You have drooping in your face.  These symptoms may represent a serious problem that is an emergency. Do not wait to see if the symptoms will go away. Get medical help right away. Call your local emergency services (911 in the [place] Do not drive yourself to the hospital.   This information is not intended to replace advice given to you by your health care provider. Make sure you discuss any questions you have with your health care provider.  Document Released: 12/08/2003 Document Revised: 08/13/2017 Document Reviewed: 09/26/2016  Elsevier Interactive Patient Education © 2017 [employer]          .Discharge Instructions    Discharged to home by car with relative. Discharged via wheelchair, hospital escort: Yes.  Special equipment needed: Not Applicable    Be sure to schedule a follow-up appointment with your primary care doctor or any specialists as instructed.      Discharge Plan:        I understand that a diet low in cholesterol, fat, and sodium is recommended for good health. Unless I have been given specific instructions below for another diet, I accept this instruction as my diet prescription.   Other diet: regular    Special Instructions: None    · Is patient discharged on Warfarin / Coumadin?   No     Depression / Suicide Risk    As you are discharged from this [facility] facility, it is important to learn how to keep safe from harming yourself.    Recognize the warning signs:  · Abrupt changes in personality, positive or negative- including increase in energy   · Giving away possessions  · Change in eating patterns- significant weight changes-  positive or negative  · Change in sleeping patterns- unable to sleep or sleeping all the time   · Unwillingness or inability to communicate  · Depression  · Unusual sadness, discouragement and loneliness  · Talk of wanting to die  · Neglect of personal appearance   · Rebelliousness- reckless behavior  · Withdrawal from people/activities they love  · Confusion- inability to concentrate     If you or a loved one observes any of these behaviors or has concerns about self-harm, here's what you can do:  · Talk about it- your feelings and reasons for harming yourself  · Remove any means that you might use to hurt yourself (examples: pills, rope, extension cords, firearm)  · Get professional help from the community (Mental Health, Substance Abuse, psychological counseling)  · Do not be alone:Call your Safe Contact- someone whom you trust who will be there for you.  · Call your local CRISIS HOTLINE 474-6663 or 307-200-1672  · Call your local Children's Mobile Crisis Response Team [place] (648) 578-1897 or www.[employer]  · Call the toll free National Suicide Prevention Hotlines   · National Suicide Prevention Lifeline 008-810-LNFY (5220)  · National Hope Line Network 800-SUICIDE (539-1112)

## 2020-05-26 NOTE — PROGRESS NOTES
Assumed care of pt w/ bedside report from LORE Marie. Pt resting comfortably in bed, no complaints offered. Fall precautions/safety maintained. Hourly rounding. Will continue to monitor.

## 2020-05-26 NOTE — PROGRESS NOTES
Patient AOx4. Patient stated they have concern about heart rhythms and requested to be informed of any and all irregularities. Patient noted to have runs of ST and junctional rhythm. Remained asymptomatic throughout shift. Up to self. No pain noted throughout shift. VSS. HX of VALERIE. No needs at this time.

## 2020-05-26 NOTE — TELEPHONE ENCOUNTER
"From: Martha Ordonez  To: Harshad Mason M.D.  Sent: 5/26/2020 4:06 PM PDT  Subject: Procedure Question    Dr. Mason,  Thank you kindly for your reply. I do not have a machine any longer. I will see you at the appointment tomorrow. Yes, please order the Ambien-[facility]. My only concern is that when I'm \"knocked out,\" I can accept almost any pressure but then when I'm home the machine over breathes me. We have to find a way to make it work at home without sedation since Ambien is habit forming.   Thank you!  Martha Ordonez      ----- Message -----   From:Harshad Mason M.D.   Sent:5/26/2020 10:13 AM PDT   To:Martha Ordonez   Subject:RE: Procedure Question    Hello,   I am sorry to hear about your sleep. Unfortunately you might be having the palpitation. Racing heart rate due to the relapse of the sleep apnea. I can definitely order the titration study. I agree that we should try to find comfortable and working settings for the treatment to be successful. Destiney will call you later today to schedule it. On a side note, do you still have your machine and do you want me to write a sleep medication (ambien) for the night of the study se we can have successful titration with minimum interruption in sleep?     Harshad Mason M.D.      ----- Message -----   From:Martha Ordonez   Sent:5/22/2020 3:21 AM PDT   To:Harshad Mason M.D.   Subject:Procedure Question    Dr. Mason, My sleep has deteriorated. I am waking up almost every hour with heart racing. Then I'm awake for an hour until it calms down only for the same thing to happen when I go back to sleep. I did not go to [place] due to Covid restrictions. I need to try the machine again. Is it possible to come into the clinic for an appointment at night and have a tech work with me. One of the problems I had with the machine was that it was sending me a breath when I was in the middle of a breath and then I felt smothered. I tried changing all the setting to " no avail--BPM, pressure, etc. Please advise how I should proceed. If it is not possible to come in, if you will reorder I will try again.  Thank you.  Martha Ordonez

## 2020-05-26 NOTE — PROGRESS NOTES
MD Rizzo at bedside, cleared pt for discharge. Discussed discharge instructions, denies questions. Tele and IV D/C. Belongings gathered and given to pt upon leaving the unit. Pt escorted off unit in stable condition via w/c by CNA.

## 2020-05-27 ENCOUNTER — SLEEP CENTER VISIT (OUTPATIENT)
Dept: SLEEP MEDICINE | Facility: MEDICAL CENTER | Age: 75
End: 2020-05-27
Payer: MEDICARE

## 2020-05-27 ENCOUNTER — SLEEP STUDY (OUTPATIENT)
Dept: SLEEP MEDICINE | Facility: MEDICAL CENTER | Age: 75
End: 2020-05-27
Attending: FAMILY MEDICINE
Payer: MEDICARE

## 2020-05-27 VITALS
HEART RATE: 72 BPM | HEIGHT: 65 IN | WEIGHT: 168.5 LBS | DIASTOLIC BLOOD PRESSURE: 84 MMHG | BODY MASS INDEX: 28.07 KG/M2 | OXYGEN SATURATION: 95 % | SYSTOLIC BLOOD PRESSURE: 122 MMHG | RESPIRATION RATE: 14 BRPM

## 2020-05-27 DIAGNOSIS — G47.31 CENTRAL SLEEP APNEA: ICD-10-CM

## 2020-05-27 DIAGNOSIS — G47.31 COMPLEX SLEEP APNEA SYNDROME: ICD-10-CM

## 2020-05-27 PROCEDURE — 99213 OFFICE O/P EST LOW 20 MIN: CPT | Performed by: FAMILY MEDICINE

## 2020-05-27 ASSESSMENT — FIBROSIS 4 INDEX: FIB4 SCORE: 1.99

## 2020-05-27 NOTE — DISCHARGE SUMMARY
Discharge Summary    CHIEF COMPLAINT ON ADMISSION  Chief Complaint   Patient presents with   • Palpitations       Reason for Admission  Arm Numbness, Chest Pain      Admission Date  5/25/2020    CODE STATUS  Prior    HPI & HOSPITAL COURSE  This is a 74 y.o. female here with palpitations.  She has previous diagnosis of complex sleep apnea including central sleep apnea events she was placed on ASV device but was not able to tolerate it and so return the equipment, Dr. Mason attempted to refer her to [facility] for further evaluation however due to COVID crisis, this referral was delayed.  She presented with acute palpitations and awakening with feeling of impending doom.  With supplemental oxygen she seemed to improve but the night prior to admission she had again awakened with a sensation of impending doom.    Patient was monitored on telemetry and had runs of sinus tachycardia and occasional junctional rhythm she was asymptomatic with these rhythms.    She was seen by pulmonary medicine who we are able to get her a follow-up visit the day after discharge.  She is to continue her nocturnal oxygen at night until further recommendations from pulmonary medicine       Therefore, she is discharged in good and stable condition to home with close outpatient follow-up.        Discharge Date  5/26/2020    FOLLOW UP ITEMS POST DISCHARGE  Pulmonary/ Sleep medicine  tomorrow    DISCHARGE DIAGNOSES  Principal Problem:    Primary central sleep apnea POA: Yes  Active Problems:    Essential hypertension POA: Yes    Gastroesophageal reflux disease without esophagitis POA: Yes    Complex sleep apnea syndrome POA: Yes  Resolved Problems:    * No resolved hospital problems. *      FOLLOW UP  Future Appointments   Date Time Provider Department Center   5/27/2020 10:40 AM Harshad Mason M.D. [facility] None   5/27/2020  9:05 PM SLEEP TECH [facility] None   7/8/2020  9:00 AM Sabra Arguello M.D. [facility] None     Sabra Arguello M.D.  [address] [place]  "[place] [id number]  [phone]            MEDICATIONS ON DISCHARGE     Medication List      CONTINUE taking these medications      Instructions   losartan 25 MG Tabs  Commonly known as:  COZAAR   Take 1 Tab by mouth 2 Times a Day.  Dose:  25 mg     omeprazole 10 MG Cpdr  Commonly known as:  PRILOSEC   Take 1 Cap by mouth 2 times a day.  Dose:  10 mg     REFRESH OP   Place 1 Drop in both eyes 4 times a day as needed.  Dose:  1 Drop     vitamin D 2000 UNIT Tabs   Take 4,000 Units by mouth every day.  Dose:  4,000 Units            Allergies  Allergies   Allergen Reactions   • Ace Inhibitors      Cough   • Ceclor [Cefaclor] Hives   • Ceftin Hives   • Cephalosporins Rash   • Ciprofloxacin Rash   • Kenalog Hives   • Lamisil [Terbinafine Hcl]    • Latex Rash   • Merthiolate [Thimerosal] Hives   • Monistat [Tioconazole] Itching   • Pcn [Penicillins] Hives   • Trazodone Hcl      \"wired\", \"felt really hot\"   • Benzalkonium Chloride Rash   • Tetracyclines Rash       DIET  Regular    ACTIVITY  regular    CONSULTATIONS  Pulmonary medicine    PROCEDURES  none    LABORATORY  Lab Results   Component Value Date    SODIUM 140 05/25/2020    POTASSIUM 4.1 05/25/2020    CHLORIDE 102 05/25/2020    CO2 24 05/25/2020    GLUCOSE 115 (H) 05/25/2020    BUN 16 05/25/2020    CREATININE 0.79 05/25/2020        Lab Results   Component Value Date    WBC 6.7 05/25/2020    HEMOGLOBIN 14.0 05/25/2020    HEMATOCRIT 41.8 05/25/2020    PLATELETCT 207 05/25/2020          "

## 2020-05-27 NOTE — PROGRESS NOTES
[facility] Follow Up Note     Date: 5/27/2020 / Time: 10:35 AM    Patient ID:   Name:             Martha Ordonez   YOB: 1945  Age:                 74 y.o.  female   MRN:               [id number]      Thank you for requesting a sleep medicine consultation on Martha Ordonez at the sleep center. She presents today with the chief complaints of complex sleep apnea follow up. She was last seen on 12/12/19.    HISTORY OF PRESENT ILLNESS:       Pt is currently not on night time oxygen at 2 L/min. Since her last visit she has discontinued the ASV therapy due to mask and pressure intolerability. She was previously on ASV EPAP 7/15 cm PS 6/20 cm. On her last visit she was referred to [facility] for a second review. Other treatment options have been dicussed in past as well however due to her complex sleep apnea, it was not recommended. However due to COVID-19 pandemic she has not been able to have her consultation at the [facility].  She goes to sleep around 10 pm and wakes up around 7-8 am. She is getting about 4.5 hrs of sleep on a good night. The bad nights are every night of the week. She has been having frequrnt awakening and has hard time falling back asleep after waking up from gasping spell.  Overall, she doesnot finds her sleep refreshing. She does take regular naps. The symptoms of sleep apnea has relapsed since she has been off the therapy. She complained of occasional snoring and waking up gasping/startled.     She was admitted in hospital on 5/23/20 for CP and SOB. She has been waking up middle of the night feeling feeling CP, palpitation and SOB. Patient was monitored on telemetry and had runs of sinus tachycardia and occasional junctional rhythm she was asymptomatic with these rhythms. The recent ECHO on 5/25/20 showed preserved EF of 70% and mild MR. On her discharge she was ordered a home O2 for night time use as well as order for a holter monitor.       SLEEP  HISTORY   HST which showed   Moderate  obstructive sleep apnea with  Respiratory Event Index (BEBETO) of 19.7 per hour and worse in supine sleep with BEBETO at 32/hr. O2 Sat. kwadwo was 80% and mean O2 sat was 91% and baseline O2 at 95 %. O2 sat was below 90% for 31% of the flow evaluation time. Oxygen Desaturation (>=4%) Index was elevated at 21.3/hr.       PSG showed mild obstructive and central respiratory events noted. The overall AHI was 14.7  events per hour comprised of 29% hypopneas, 38% obstructive apneas and 33% central apneas. The events were associated with desaturations to a kwadwo of 83%.The sleep efficiency was very poor 23% with only 85 minutes total sleep time obtained.    ASv titration and the best tolerated pressure was ASV EPAP 7/15 cm PS 6/20 cm and the AHi improved to 4.6/hr and O2 kwadwo 86%.     REVIEW OF SYSTEMS:       Constitutional: Denies fevers, Denies weight changes  Eyes: Denies changes in vision, no eye pain  Ears/Nose/Throat/Mouth: Denies nasal congestion or sore throat   Cardiovascular: Denies chest pain or palpitations   Respiratory: Denies shortness of breath , Denies cough  Gastrointestinal/Hepatic: Denies abdominal pain, nausea, vomiting, diarrhea, constipation or GI bleeding   Genitourinary: Deniesdysuria or frequency  Musculoskeletal/Rheum: Denies  joint pain and swelling   Skin/Breast: Denies rash,   Neurological: Denies headache, confusion, memory loss or focal weakness/parasthesias  Psychiatric: denies mood disorder   Sleep: + VALERIE    Comprehensive review of systems form is reviewed with the patient and is attached in the EMR.     PMH:  has a past medical history of Abnormal thyroid function test (9/10/2014), Allergic rhinitis, Anxiety, Asthma, Back pain, Basal cell carcinoma, Bronchitis, Cardiac arrhythmia, Depression, Elevated liver enzymes (9/10/2014), Family history of melanoma, GERD (gastroesophageal reflux disease), Glaucoma suspect of both eyes, Hyperlipidemia, Hypertension,  "Nasal drainage, OCD (obsessive compulsive disorder), Pancreatitis, Parathyroid disorder (HCC), PCOS (polycystic ovarian syndrome), S/P ERCP, and Transient global amnesia.  MEDS:   Current Outpatient Medications:   •  omeprazole (PRILOSEC) 10 MG CAPSULE DELAYED RELEASE, Take 1 Cap by mouth 2 times a day., Disp: 180 Cap, Rfl: 1  •  losartan (COZAAR) 25 MG Tab, Take 1 Tab by mouth 2 Times a Day., Disp: 180 Tab, Rfl: 1  •  Cholecalciferol (VITAMIN D) 2000 UNIT Tab, Take 4,000 Units by mouth every day., Disp: , Rfl:   •  zolpidem (AMBIEN) 5 MG Tab, Take 1 Tab by mouth at bedtime as needed for up to 1 day. (Patient not taking: Reported on 5/27/2020), Disp: 2 Tab, Rfl: 0  •  Polyvinyl Alcohol-Povidone (REFRESH OP), Place 1 Drop in both eyes 4 times a day as needed., Disp: , Rfl:   ALLERGIES:   Allergies   Allergen Reactions   • Ace Inhibitors      Cough   • Ceclor [Cefaclor] Hives   • Ceftin Hives   • Cephalosporins Rash   • Ciprofloxacin Rash   • Kenalog Hives   • Lamisil [Terbinafine Hcl]    • Latex Rash   • Merthiolate [Thimerosal] Hives   • Monistat [Tioconazole] Itching   • Pcn [Penicillins] Hives   • Trazodone Hcl      \"wired\", \"felt really hot\"   • Benzalkonium Chloride Rash   • Tetracyclines Rash     SURGHX:   Past Surgical History:   Procedure Laterality Date   • ABDOMINAL HYSTERECTOMY TOTAL      ovaries remain   • ADENOIDECTOMY     • CARDIAC CATH, RIGHT HEART      normal, EKG was Abnormal    • CHOLECYSTECTOMY     • DILATION AND CURETTAGE     • EYE SURGERY      b/l iridotomy   • HYSTERECTOMY LAPAROSCOPY     • LUMPECTOMY      benign   • TONSILLECTOMY     • US-NEEDLE CORE BX-BREAST PANEL       SOCHX:  reports that she has never smoked. She has never used smokeless tobacco. She reports that she does not drink alcohol or use drugs..  FH:   Family History   Problem Relation Age of Onset   • Hypertension Mother    • Heart Disease Father         CHF   • Cancer Father         melanoma   • Hypertension Sister    • Stroke " "Sister    • Sleep Apnea Sister    • Cancer Paternal Grandfather         colon   • Heart Disease Paternal Grandfather    • Stroke Paternal Grandfather    • Diabetes Paternal Grandfather    • Other Maternal Grandmother         eplepsey   • Heart Attack Maternal Grandfather    • Stroke Maternal Grandfather    • Stroke Paternal Grandmother    • Sleep Apnea Sister          Physical Exam:  Vitals/ General Appearance:   Weight/BMI: Body mass index is 28.04 kg/m².  /84 (BP Location: Left arm, Patient Position: Sitting, BP Cuff Size: Adult)   Pulse 72   Resp 14   Ht 1.651 m (5' 5\")   Wt 76.4 kg (168 lb 8 oz)   SpO2 95%   Vitals:    05/27/20 1035   BP: 122/84   BP Location: Left arm   Patient Position: Sitting   BP Cuff Size: Adult   Pulse: 72   Resp: 14   SpO2: 95%   Weight: 76.4 kg (168 lb 8 oz)   Height: 1.651 m (5' 5\")       Pt. is alert and oriented to time, place and person. Cooperative and in no apparent distress.       Constitutional: Alert, no distress, well-groomed.  Skin: No rashes in visible areas.  Eye: Round. Conjunctiva clear, lids normal. No icterus.   ENMT: Lips pink without lesions, good dentition, moist mucous membranes. Phonation normal.  Neck: No masses, no thyromegaly. Moves freely without pain.  -. Lungs auscultation: B/L good air entry, vesicular breath sounds, no adventitious sounds  -. Heart auscultation: 1st and 2nd heart sounds normal, regular rhythm. No appreciable murmur.  - Extremities: no clubbing, no pedal edema.  - NEUROLOGICAL EXAMINATION: On neurological exam, the patient was alert and oriented x3. speech was clear and fluent without dysarthria.  ASSESSMENT AND PLAN     1.complex Sleep Apnea .   The pathophysiology of sleep anea and the increased risk of cardiovascular morbidity from untreated sleep apnea is discussed in detail with the patient.      She is urged to avoid supine sleep, weight gain and alcoholic beverages since all of these can worsen sleep apnea. She is cautioned " against drowsy driving. If She feels sleepy while driving, She must pull over for a break/nap, rather than persist on the road, in the interest of She own safety and that of others on the road.   Plan   - She is scheduled for ASV titration tonight. I will call in the results tomorrow    - in meantime continue using the nocturnal O2 at 2l/min    2. Insomnia: secondary to untreated VALERIE    - stimulus control and sleep hygiene reinforced    - reassess after treatment of VALERIE  - we will consider ambien and or trazodone after treatment of the sleep apnea  - she has hx of axniety recommended to discuss with PCP about restarting the SSRI

## 2020-05-27 NOTE — PATIENT COMMUNICATION
Called in Rx for ZOLPIDEM ( AMBIEN) 5 MG TAB to [id number] - [place], [place] - [address]

## 2020-05-28 DIAGNOSIS — G47.33 OSA (OBSTRUCTIVE SLEEP APNEA): ICD-10-CM

## 2020-05-28 NOTE — PROCEDURES
Technical summary: The patient underwent a split-night polysomnogram. This was a 16 channel montage study to include a 6 channel EEG, a 2 channel EOG, and chin EMG, left and right leg EMG, a snore channel, a nasal pressure transducer, and a nasal oral airflow  thermistor and a CFLOW pressure transducer.   Respiratory effort was assessed with the use of a thoracic and abdominal monitor and overnight oximetry was obtained. Audio and video recordings were reviewed. This was a fully attended study and sleep stage scoring was performed. The test was technically adequate.    Scoring Criteria: A modification of the the AASM Manual for the Scoring of Sleep and Associated Events. Obstructive apnea was scored by cessation of airflow for at least 10 seconds with continuing respiratory effort.  Central apnea was scored by cessation of airflow for at least 10 seconds with no effort.  Hypopnea was scored by a 30% or more reduction in airflow for at least 10 seconds accompanied by an arterial oxygen desaturation of 3% or more.  (For Medicare patients, hypopneas were scored by a 30% or more reduction in airflow for at least 10 seconds accompanied by an arterial oxygen saturation of 4% or more, as required by their insurance, CMS.    Interpretation:  Study start time was 10:32:42 PM. Total recording time was 3h 12.5m (192 minutes) with a total sleep time of 2h 29.5m (149 minutes) resulting in a sleep efficiency of 77.66%.Sleep latency from the start fo the study was 24 minutes minutes and REM latency from sleep onset was 00 minutes minutes.    Respiratory:   There were 182 apneas in total consisting of 173 obstructive apneas, 0 mixed apneas, and 9 central apneas. There were 14 hypopneas in total.The apnea index was 73.04 per hour and the hypopnea index was 5.62 per hour.The overall AHI was 78.7, with a REM AHI of 0.00, and a supine AHI of 0.00.    Limb Movements:  There were a total of 58 periodic leg movements, of which 9 were  PLMS arousals. This resulted in a PLMS index of 23.3 and a PLMS arousal index of 3.6    Oximetry:  The mean SaO2 was 90.0% for the diagnostic portion of the study, with a minimum SaO2 of 76.0%.     Treatment:  Interpretation:  Treatment recording time was 3h 38.5m (218 minutes) with a total sleep time of 2h 41.0m (161 minutes) resulting in a sleep efficiency of 73.7%. Sleep latency from the start of treatment was 34 minutes minutes and REM latency from sleep onset was 1h 31.0m minutes. The patient had 21 arousals in total for an arousal index of 7.8.    Respiratory:   There were 1 apneas in total consisting of 0 obstructive apneas, 1 central apneas, and 0 mixed apneas for an apnea index of 0.37. The patient had 9 hypopneas in total, which resulted in a hypopnea index of 3.35. The overall AHI was 3.73, with a REM AHI of 2.67, and a supine AHI of 0.00.   10% of the apneas were central apneas.    Limb Movements:  There were a total of 0 periodic leg movements, of which 0 were PLMS arousals. This resulted in a PLMS index of 0.0 and a PLMS arousal index of 0.0.    Oximetry:  The mean SaO2 during treatment was 94.0%, with a minimum oxygen saturation of 84.0%.    CPAP was tried from 6cm to 10cm H2O.      CPAP Titration:  Due to the significant number of obstructive respiratory events observed during the diagnostic portion of the study a CPAP titration trial was performed during the second half of the night. The CPAP pressure was initiated at 5 cm of water and the pressure was increased in an attempt to eliminate all sleep disordered breathing and snoring. The CPAP pressure was increased to 10 cm water. The lowest best tolerated pressure was CPAP 6 cm, at this  pressure the patient was observed in non supine  REM sleep stage. The apnea hypopnea index improved to 0.82/hr with improved O2 kwadwo of  84%.She spent 1.6 min of sleep time below 89% O2 saturation Snoring was resolved. The patient utilized small simplus mask with  heated humidification. The CPAP was well-tolerated and there was minimal air leaks. No supplemental oxygen was required.    Impression:  1.  Severe obstructive sleep apnea with AHI of 78.7/hr and O2 kwadwo 76 %. Due to severity of the disease she met the split study protocol. The titration started with CPAP 6 cm and the best tolerated was CPAP 6 cm. The AHI improved to 0.82/hr with improved O2 kwadwo of 84% and average O2 saturation of 94 %.         Recommendations:  I recommend CPAP 6 cm with simplus mask. I also recommend 30 day compliance download to assess the efficacy to the recommended pressure, measure leak, apnea hypopnea index and compliance for further outpatient monitoring and management of CPAP therapy. In some cases alternative treatment options may prove effective in resolving sleep apnea and these options include upper airway surgery, the use of a dental orthotic or weight loss and positional therapy. Clinical correlation is required. In general patients with sleep apnea are advised to avoid alcohol and sedatives and to not operate a motor vehicle while drowsy and are at a greater risk for cardiovascular disease.

## 2020-05-29 ENCOUNTER — TELEMEDICINE (OUTPATIENT)
Dept: MEDICAL GROUP | Facility: IMAGING CENTER | Age: 75
End: 2020-05-29
Payer: MEDICARE

## 2020-05-29 ENCOUNTER — APPOINTMENT (OUTPATIENT)
Dept: RADIOLOGY | Facility: MEDICAL CENTER | Age: 75
End: 2020-05-29
Attending: NURSE PRACTITIONER
Payer: MEDICARE

## 2020-05-29 VITALS
HEIGHT: 65 IN | HEART RATE: 59 BPM | TEMPERATURE: 97.5 F | SYSTOLIC BLOOD PRESSURE: 147 MMHG | DIASTOLIC BLOOD PRESSURE: 75 MMHG | BODY MASS INDEX: 28.04 KG/M2

## 2020-05-29 DIAGNOSIS — M85.89 OSTEOPENIA OF MULTIPLE SITES: ICD-10-CM

## 2020-05-29 DIAGNOSIS — R79.89 INCREASED PTH LEVEL: ICD-10-CM

## 2020-05-29 DIAGNOSIS — I49.9 IRREGULAR HEART RHYTHM: ICD-10-CM

## 2020-05-29 DIAGNOSIS — G47.31 COMPLEX SLEEP APNEA SYNDROME: ICD-10-CM

## 2020-05-29 DIAGNOSIS — R00.2 PALPITATIONS: ICD-10-CM

## 2020-05-29 DIAGNOSIS — G47.33 OSA (OBSTRUCTIVE SLEEP APNEA): ICD-10-CM

## 2020-05-29 DIAGNOSIS — F41.9 ANXIETY: ICD-10-CM

## 2020-05-29 DIAGNOSIS — I27.20 PULMONARY HYPERTENSION (HCC): ICD-10-CM

## 2020-05-29 DIAGNOSIS — R74.8 ELEVATED ALKALINE PHOSPHATASE LEVEL: ICD-10-CM

## 2020-05-29 DIAGNOSIS — I10 ESSENTIAL HYPERTENSION: ICD-10-CM

## 2020-05-29 DIAGNOSIS — E55.9 VITAMIN D INSUFFICIENCY: ICD-10-CM

## 2020-05-29 PROCEDURE — 99214 OFFICE O/P EST MOD 30 MIN: CPT | Mod: 95,CR | Performed by: FAMILY MEDICINE

## 2020-05-29 RX ORDER — SERTRALINE HYDROCHLORIDE 25 MG/1
25 TABLET, FILM COATED ORAL DAILY
Qty: 90 TAB | Refills: 1 | Status: SHIPPED | OUTPATIENT
Start: 2020-05-29 | End: 2020-08-17 | Stop reason: SDUPTHER

## 2020-05-29 NOTE — PROGRESS NOTES
Telemedicine Visit: Established Patient     This encounter was conducted via Zoom .   Verbal consent was obtained. Patient's identity was verified.    Subjective:     Chief Complaint   Patient presents with   • Apnea     hospitalized over the weekend, referral to cardiology recommended    • Hypertension     recommended to increase bp medication    • Stress     requesting zoloft        Martha Tasneem Ordonez is a 74 y.o. female with hypertension, hypercholesterolemia, sleep apnea, history of PAC and PVCs, anxiety, mild asthma and GERD for follow up of ER and hospital visit for possible apneic episode.   See in ER 5/23/20 and again 5/25/20 and was admitted at that time.     Sleep apnea-was told in past she had some central sleep apnea, states she tried an ASV machine last fall, did not tolerate it.   She has been referred to [place] and has been waiting further assessment. She has also previously seen Dr. Mason, pulmonary.   Last 2-3 months feels it has been worse. Past week had some difficulty with sleep, chest discomfort and feeling short of breath. She  woke up every hours.   Friday night May 22 and Saturday Mary 23 morning woke with chest pain. Was evaluated for this in at [facility] ER.   Had nocturnal oxygen prescribed to ER.  Advised previously by pulmonary that nocturnal oxygen would likely not be helpful.  Woke with pain again, numbness in both arms. Also with palpitations.   She went back to the ER on May 25 and was admitted.  States that the pulse oximeter dropped at night.   States sinus ollie and tachy and junctional rhythm were also noted.  Had palpitations when waking.  Notes that her heart rate was high.  States that she spoke with Dr. Smith- pulmonary/sleep medicine.  Wednesday night on May 27 was able to complete her sleep test.    Reports that central apnea was not noted, now she had findings of severe sleep apnea.  Notes the technician made sure to check the leads for sleep test, unclear if this was  "completed on prior sleep test.  She did have a CPAP ordered and has a mask.  Discussed possibly using Ambien at nighttime.  Was recommended to stay on nocturnal oxygen until she has her CPAP.  She was recommended to sleep on her side.    She does have an appointment for neurology but needs to reschedule.   Need cardiology consult.   Hospital ordered holter monitor-due to having irregular heartbeats, noted to be asymptomatic during the irregular rhythms at hospital stay.     Hypertension-blood pressure has been around 140s to 90s.  Has been slightly elevated.  She has been using losartan 25 mg twice daily. Has tolerated medication well.     Anxiety- related to to above symptoms, would like to restart her zoloft 25 mg daily.     PTH-is to recheck her labs.  Need new order for PTH and alkaline phosphatase levels.  Also needs to repeat vitamin D levels- on supplement therapy, she does have osteopenia.       ROS: Denies any recent fevers or chills. No nausea or vomiting. No edema.     Allergies   Allergen Reactions   • Ace Inhibitors      Cough   • Ceclor [Cefaclor] Hives   • Ceftin Hives   • Cephalosporins Rash   • Ciprofloxacin Rash   • Clarithromycin      Arrhythmias. Able to take other -mycins.    • Kenalog Hives   • Lamisil [Terbinafine Hcl]    • Latex Rash   • Merthiolate [Thimerosal] Hives   • Monistat [Tioconazole] Itching   • Pcn [Penicillins] Hives   • Simvastatin      Myalgia   • Trazodone Hcl      \"wired\", \"felt really hot\"   • Benzalkonium Chloride Rash   • Tetracyclines Rash       Current medicines (including changes today)  Current Outpatient Medications   Medication Sig Dispense Refill   • ALBUTEROL INH Inhale  by mouth.     • sertraline (ZOLOFT) 25 MG tablet Take 1 Tab by mouth every day. 90 Tab 1   • Polyvinyl Alcohol-Povidone (REFRESH OP) Place 1 Drop in both eyes 4 times a day as needed.     • omeprazole (PRILOSEC) 10 MG CAPSULE DELAYED RELEASE Take 1 Cap by mouth 2 times a day. 180 Cap 1   • losartan " (COZAAR) 25 MG Tab Take 1 Tab by mouth 2 Times a Day. (Patient taking differently: Take 75 mg by mouth 2 Times a Day.) 180 Tab 1   • Cholecalciferol (VITAMIN D) 2000 UNIT Tab Take 4,000 Units by mouth every day.     • ALPRAZolam (XANAX) 0.25 MG Tab Take 1 Tab by mouth at bedtime as needed for Sleep for up to 30 days. 30 Tab 0     No current facility-administered medications for this visit.        Patient Active Problem List    Diagnosis Date Noted   • VALERIE (obstructive sleep apnea) 06/03/2020   • Incomplete emptying of bladder 03/04/2019   • Chronic right-sided low back pain with right-sided sciatica 01/16/2019   • Pelvic pain 01/16/2019   • Migraine without aura and without status migrainosus, not intractable 08/16/2018   • Complex sleep apnea syndrome 03/23/2018   • PVCs (premature ventricular contractions) 09/04/2017   • PAC (premature atrial contraction) 09/04/2017   • Nocturnal oxygen desaturation 08/31/2017   • Elevated alkaline phosphatase level 06/29/2016   • Increased PTH level 05/04/2016   • Vitamin D insufficiency 05/04/2016   • Environmental allergies 02/21/2016   • OCD (obsessive compulsive disorder)    • Family history of melanoma 02/12/2016   • Fatigue 12/31/2015   • Mild asthma 09/10/2014   • Pure hypercholesterolemia 09/10/2014   • Essential hypertension 09/10/2014   • Anxiety 09/10/2014   • Transient global amnesia 09/10/2014   • Gastroesophageal reflux disease without esophagitis 09/10/2014       Family History   Problem Relation Age of Onset   • Hypertension Mother    • Heart Disease Father         CHF   • Cancer Father         melanoma   • Hypertension Sister    • Stroke Sister    • Sleep Apnea Sister    • Cancer Paternal Grandfather         colon   • Heart Disease Paternal Grandfather    • Stroke Paternal Grandfather    • Diabetes Paternal Grandfather    • Other Maternal Grandmother         eplepsey   • Heart Attack Maternal Grandfather    • Stroke Maternal Grandfather    • Stroke Paternal  "Grandmother    • Sleep Apnea Sister        She  has a past medical history of Abnormal thyroid function test (9/10/2014), Allergic rhinitis, Anxiety, Asthma, Back pain, Basal cell carcinoma, Bronchitis, Cardiac arrhythmia, Depression, Elevated liver enzymes (9/10/2014), Family history of melanoma, GERD (gastroesophageal reflux disease), Glaucoma suspect of both eyes, Hyperlipidemia, Hypertension, Nasal drainage, OCD (obsessive compulsive disorder), Pancreatitis, Parathyroid disorder (HCC), PCOS (polycystic ovarian syndrome), S/P ERCP, and Transient global amnesia.  She  has a past surgical history that includes cholecystectomy; tonsillectomy; lumpectomy; abdominal hysterectomy total; cardiac cath, right heart; eye surgery; us-needle core bx-breast panel; hysterectomy laparoscopy; dilation and curettage; and adenoidectomy.       Objective:   /75   Pulse (!) 59   Temp 36.4 °C (97.5 °F)   Ht 1.651 m (5' 5\")   LMP  (LMP Unknown)   BMI 28.04 kg/m²     Physical Exam:  Constitutional: Alert, no distress, well-groomed.  Skin: No rashes in visible areas.  Eye: Round. Conjunctiva clear, lids normal. No icterus.   ENMT: Lips pink without lesions, good dentition, moist mucous membranes. Phonation normal.  Neck: No masses, no thyromegaly. Moves freely without pain.  CV: Pulse as reported by patient  Respiratory: Unlabored respiratory effort, no cough or audible wheeze  Psych: Alert and oriented x3, normal affect and mood.       Narrative & Impression      Transthoracic  Echo Report        Echocardiography Laboratory     CONCLUSIONS  Normal left ventricular systolic function.  Left ventricular ejection fraction is visually estimated to be 70%.  Mild mitral regurgitation.  Right heart pressures are consistent with mild pulmonary hypertension.     DEB BANGURA  Exam Date:         05/25/2020                      11:40  Exam Location:     Inpatient       Assessment and Plan:   The following treatment plan was discussed: "      74 y.o. female with hypertension, hypercholesterolemia, sleep apnea, history of PAC and PVCs, anxiety, mild asthma and GERD.     1. Complex sleep apnea syndrome- hx of central sleep apnea diagnosis, which from recent sleep test dose not appear to be present. Currently with VALERIE noted on recent test. She is followed by pulonary.      2. VALERIE (obstructive sleep apnea)- as above. Awaiting CPAP, on nocturnal oxygen at this time. She will plan to follow up with pulmonary.      3. Palpitations- Appears sleep apnea contributing and possibly anxiety. Irregular rhythm noted in hospital, which appeared asymptomatic at the time.   -Will await holter monitor, refer to cardiology.      4. Irregular heart rhythm- noted to have asymptomatic irregular rhythm during hospital visit.   -Holter monitor, cardiology referral.    REFERRAL TO CARDIOLOGY   5. Pulmonary hypertension (HCC) - mild, 5/2020 recent echocardiogram, suspected related to sleep apnea.  -Refer to cardiology.  REFERRAL TO CARDIOLOGY   6. Anxiety- worse with recent symptoms.   -Restart zoloft 25 mg daily.   zoloft 25 mg   7. Increased PTH level   -Monitor, recheck labs.  PTH INTACT W/CA   8. Elevated alkaline phosphatase level   -Monitor, recheck labs.  ALKALINE PHOSPHATASE ISOENZYMES   9. Vitamin D insufficiency- on supplement therapy.   -Monitor labs.  VITAMIN D,25 HYDROXY   10. Osteopenia of multiple sites-on vitamin D as above, recheck labs at this time.      11.    Hypertension- has been more elevated lately, likely multifactorial.             -Advised to increase losartan to 50 mg twice daily as tolerated.            -Monitor BP and report. Heart healthy diet and routine exercise as tolerated.       Follow-up: Return in about 1 month (around 6/29/2020). Follow up sooner as needed.          This medical record contains text that has been entered with the assistance of computer voice recognition and dictation software.  Therefore, it may contain unintended errors  in text, spelling, punctuation, or grammar.

## 2020-05-30 ENCOUNTER — TELEPHONE (OUTPATIENT)
Dept: MEDICAL GROUP | Facility: LAB | Age: 75
End: 2020-05-30

## 2020-05-30 ENCOUNTER — TELEPHONE (OUTPATIENT)
Dept: SLEEP MEDICINE | Facility: MEDICAL CENTER | Age: 75
End: 2020-05-30

## 2020-05-30 NOTE — TELEPHONE ENCOUNTER
"Patient recently admitted with waking up at from sleep with symptoms of numbness.   Had sleep study done on 5/27/20  \"Impression:  1.  Severe obstructive sleep apnea with AHI of 78.7/hr and O2 kwadwo 76 %. Due to severity of the disease she met the split study protocol. The titration started with CPAP 6 cm and the best tolerated was CPAP 6 cm. The AHI improved to 0.82/hr with improved O2 kwadwo of 84% and average O2 saturation of 94 %.            Recommendations:  I recommend CPAP 6 cm with simplus mask. I also recommend 30 day compliance download to assess the efficacy to the recommended pressure, measure leak, apnea hypopnea index and compliance for further outpatient monitoring and management of CPAP therapy. In some cases alternative treatment options may prove effective in resolving sleep apnea and these options include upper airway surgery, the use of a dental orthotic or weight loss and positional therapy. Clinical correlation is required. In general patients with sleep apnea are advised to avoid alcohol and sedatives and to not operate a motor vehicle while drowsy and are at a greater risk for cardiovascular disease.\"    Pt called today saying she woke from sleep checked her BP which was 196 mmhg systolic and now 162 mmhg systolic  Wants to know what to do    Saw Dr. Morales -- increased BP meds  Hospital ordered holter and awaiting cardiology follow up.     She is worried that the sleep study she did was not long enough to capture centrals. Her treatment time on CPAP was 161 mins    impression/recs:  -DoNOT think sleep apnea is a cause for her symptoms  -Adequate time of treatment study to illicit if treatment emergent centrals which were not  - appears cardiac related  -Her BP is high even during the day  - she just had increased her BP meds yesterday  - recommend continue CPAP otherwise the cardiac symptoms will be exacerbated  - she is calling PCP  - She took her BP while on the phone and it is 157/97  - she " is currently asymptomatic

## 2020-05-30 NOTE — TELEPHONE ENCOUNTER
Paged to call patient. Patient reports episode during the night last night similar to prior episodes for which she was recently hospitalized and worked up for. Had started new CPAP and woke with bilateral arm numbness, heart racing, and high blood pressures. She did speak with pulmonology on call this morning, did not think sleep apnea is cause of her symptoms. Last /97. Currently no symptoms and denies numbness, headaches, chest pain, and shortness of breath. Her losartan was increased yesterday. Discussed continuing her losartan at increased dose of 50 mg BID. Discussed ER precautions over the weekend and encouraged her to call PCP on Monday.

## 2020-06-01 ENCOUNTER — TELEPHONE (OUTPATIENT)
Dept: PULMONOLOGY | Facility: HOSPICE | Age: 75
End: 2020-06-01

## 2020-06-01 NOTE — TELEPHONE ENCOUNTER
Pt called and stated that she got set up to use her husbands back up machine which she had set at CPAP [facility] through [facility]. She states she begun using this machine for now until medicare approves for her device, however she has complaints that she wakes up every 2 to 2.5hrs at night. She states she doesn't feel the pressures are to high nor does she think her mask is leaking. All she says is she feels like she wakes up anxious. Unable to pull compliance, but pt advised she will take machine to [facility] for a download and bring the report to us. She wanted me to ask what your thoughts were in the meantime.

## 2020-06-02 ENCOUNTER — SLEEP CENTER VISIT (OUTPATIENT)
Dept: SLEEP MEDICINE | Facility: MEDICAL CENTER | Age: 75
End: 2020-06-02
Payer: MEDICARE

## 2020-06-02 ENCOUNTER — HOSPITAL ENCOUNTER (OUTPATIENT)
Dept: RADIOLOGY | Facility: MEDICAL CENTER | Age: 75
End: 2020-06-02
Attending: NURSE PRACTITIONER
Payer: MEDICARE

## 2020-06-02 VITALS
BODY MASS INDEX: 27.99 KG/M2 | DIASTOLIC BLOOD PRESSURE: 80 MMHG | HEIGHT: 65 IN | RESPIRATION RATE: 16 BRPM | WEIGHT: 168 LBS | SYSTOLIC BLOOD PRESSURE: 112 MMHG | OXYGEN SATURATION: 94 % | HEART RATE: 68 BPM

## 2020-06-02 DIAGNOSIS — G47.8 OTHER SLEEP DISORDERS: ICD-10-CM

## 2020-06-02 DIAGNOSIS — G47.33 OBSTRUCTIVE SLEEP APNEA (ADULT) (PEDIATRIC): ICD-10-CM

## 2020-06-02 DIAGNOSIS — G45.4 TRANSIENT GLOBAL AMNESIA: ICD-10-CM

## 2020-06-02 DIAGNOSIS — I10 ESSENTIAL HYPERTENSION: ICD-10-CM

## 2020-06-02 DIAGNOSIS — J45.20 MILD INTERMITTENT ASTHMA WITHOUT COMPLICATION: ICD-10-CM

## 2020-06-02 DIAGNOSIS — G47.33 OBSTRUCTIVE SLEEP APNEA SYNDROME: ICD-10-CM

## 2020-06-02 DIAGNOSIS — F41.9 ANXIETY: ICD-10-CM

## 2020-06-02 PROBLEM — G47.31 PRIMARY CENTRAL SLEEP APNEA: Status: RESOLVED | Noted: 2020-05-25 | Resolved: 2020-06-02

## 2020-06-02 PROCEDURE — 70551 MRI BRAIN STEM W/O DYE: CPT

## 2020-06-02 PROCEDURE — 99214 OFFICE O/P EST MOD 30 MIN: CPT | Performed by: INTERNAL MEDICINE

## 2020-06-02 RX ORDER — ALPRAZOLAM 0.25 MG/1
0.25 TABLET ORAL NIGHTLY PRN
Qty: 30 TAB | Refills: 0 | Status: SHIPPED | OUTPATIENT
Start: 2020-06-02 | End: 2020-06-08

## 2020-06-02 ASSESSMENT — FIBROSIS 4 INDEX: FIB4 SCORE: 1.99

## 2020-06-02 ASSESSMENT — PATIENT HEALTH QUESTIONNAIRE - PHQ9: CLINICAL INTERPRETATION OF PHQ2 SCORE: 0

## 2020-06-02 NOTE — PROGRESS NOTES
CC: Sleep apnea hypopnea syndrome.    HPI:   Ms. Ordonez has been followed here for sleep disordered breathing since 2017.  She presented with snoring, witnessed nocturnal apnea and excessive daytime somnolence.  After a limited polysomnogram a home sleep test on February 23, 2018 demonstrated a respiratory event index of 19.7 events per hour and a lowest arterial oxygen saturation of 80%.  Use of an oral device for treatment of her sleep apnea was complicated by increasing jaw pain and other dental issues.  A titration polysomnogram on June 24, 2019 demonstrated treatment emergent central apnea episodes on CPAP and BiPAP.  A follow-up titration polysomnogram on August 6, 2019 suggested a good response to ASV with expiratory pressures of 7 to 15 cm of water.  She was unable to acclimate to the ASV and most recently underwent a split-night polysomnogram on May 27, 2020.     I have reviewed the data from that study and it demonstrates fragmentation of sleep with an apnea hypopnea index of 78.7 events per hour.  She had 173 episodes of obstructive apnea, 14 obstructive hypopnea episodes and 9 episodes of central apnea.  The lowest arterial oxygen saturation was 76%.  CPAP was applied at 6 to 10 cm water pressure.  At a pressure of 6 cmH2O the apnea hypopnea index was 0.8 events per hour with a mean arterial oxygen saturation of 92% and a minimum saturation of 84% on room air.  The higher CPAP pressures were characterized by higher number of hypopnea episodes although there was some improvement in arterial oxygen saturation.    A new CPAP machine was ordered but has not yet been delivered.  In the interim she has used her  backup CPAP machine set for a pressure of 6 cm of water and using a Simplus mask.  She feels that the mask fits well and is not pinching or leaking.  The last 4 nights she has put the mask on each night and falling asleep but she awakens repeatedly through the night with a sense of anxiety and  panic, prompting her to remove the mask.     She does have a history of anxiety and had previously done well on Zoloft.  She talked with Dr. Sabra Arguello, her primary care physician, a few days ago and restarted the Zoloft.    Medical history is also remarkable for asthma which she has had no recent increase in symptoms and uses a rescue inhaler regularly.  Her nocturnal awakenings do not seem to be characterized by shortness of breath or other asthmatic symptoms.    The CPAP data recording chip information is reviewed with the patient.  It demonstrates use on each of the last 40 days with an average usage of 6 hours and 30 minutes.  Leak is nil and the estimated apnea hypopnea index is 15.7 events per hour.      Patient Active Problem List    Diagnosis Date Noted   • Primary central sleep apnea 05/25/2020   • Incomplete emptying of bladder 03/04/2019   • Chronic right-sided low back pain with right-sided sciatica 01/16/2019   • Pelvic pain 01/16/2019   • Migraine without aura and without status migrainosus, not intractable 08/16/2018   • Complex sleep apnea syndrome 03/23/2018   • PVCs (premature ventricular contractions) 09/04/2017   • PAC (premature atrial contraction) 09/04/2017   • Nocturnal oxygen desaturation 08/31/2017   • Elevated alkaline phosphatase level 06/29/2016   • Increased PTH level 05/04/2016   • Vitamin D insufficiency 05/04/2016   • Environmental allergies 02/21/2016   • OCD (obsessive compulsive disorder)    • Family history of melanoma 02/12/2016   • Fatigue 12/31/2015   • Mild asthma 09/10/2014   • Pure hypercholesterolemia 09/10/2014   • Essential hypertension 09/10/2014   • Anxiety 09/10/2014   • Transient global amnesia 09/10/2014   • Gastroesophageal reflux disease without esophagitis 09/10/2014       Past Medical History:   Diagnosis Date   • Abnormal thyroid function test 9/10/2014   • Allergic rhinitis    • Anxiety    • Asthma    • Back pain    • Basal cell carcinoma     squamos cell  right ear   • Bronchitis    • Cardiac arrhythmia    • Depression    • Elevated liver enzymes 9/10/2014   • Family history of melanoma    • GERD (gastroesophageal reflux disease)    • Glaucoma suspect of both eyes    • Hyperlipidemia    • Hypertension    • Nasal drainage    • OCD (obsessive compulsive disorder)    • Pancreatitis    • Parathyroid disorder (HCC)    • PCOS (polycystic ovarian syndrome)    • S/P ERCP    • Transient global amnesia        Past Surgical History:   Procedure Laterality Date   • ABDOMINAL HYSTERECTOMY TOTAL      ovaries remain   • ADENOIDECTOMY     • CARDIAC CATH, RIGHT HEART      normal, EKG was Abnormal    • CHOLECYSTECTOMY     • DILATION AND CURETTAGE     • EYE SURGERY      b/l iridotomy   • HYSTERECTOMY LAPAROSCOPY     • LUMPECTOMY      benign   • TONSILLECTOMY     • US-NEEDLE CORE BX-BREAST PANEL         Family History   Problem Relation Age of Onset   • Hypertension Mother    • Heart Disease Father         CHF   • Cancer Father         melanoma   • Hypertension Sister    • Stroke Sister    • Sleep Apnea Sister    • Cancer Paternal Grandfather         colon   • Heart Disease Paternal Grandfather    • Stroke Paternal Grandfather    • Diabetes Paternal Grandfather    • Other Maternal Grandmother         eplepsey   • Heart Attack Maternal Grandfather    • Stroke Maternal Grandfather    • Stroke Paternal Grandmother    • Sleep Apnea Sister        Social History     Socioeconomic History   • Marital status:      Spouse name: Not on file   • Number of children: Not on file   • Years of education: Not on file   • Highest education level: Not on file   Occupational History   • Occupation: RN- retired   Social Needs   • Financial resource strain: Not on file   • Food insecurity     Worry: Not on file     Inability: Not on file   • Transportation needs     Medical: Not on file     Non-medical: Not on file   Tobacco Use   • Smoking status: Never Smoker   • Smokeless tobacco: Never Used    Substance and Sexual Activity   • Alcohol use: No     Alcohol/week: 0.0 oz   • Drug use: No   • Sexual activity: Yes     Partners: Male     Comment: , retired RN   Lifestyle   • Physical activity     Days per week: Not on file     Minutes per session: Not on file   • Stress: Not on file   Relationships   • Social connections     Talks on phone: Not on file     Gets together: Not on file     Attends Jew service: Not on file     Active member of club or organization: Not on file     Attends meetings of clubs or organizations: Not on file     Relationship status: Not on file   • Intimate partner violence     Fear of current or ex partner: Not on file     Emotionally abused: Not on file     Physically abused: Not on file     Forced sexual activity: Not on file   Other Topics Concern   •  Service No   • Blood Transfusions No   • Caffeine Concern No   • Occupational Exposure No   • Hobby Hazards No   • Sleep Concern Yes   • Stress Concern No   • Weight Concern Yes   • Special Diet Yes   • Back Care No   • Exercise Yes   • Bike Helmet Yes   • Seat Belt Yes   • Self-Exams Yes   Social History Narrative    , 2 children- daughters.        Current Outpatient Medications   Medication Sig Dispense Refill   • ALPRAZolam (XANAX) 0.25 MG Tab Take 1 Tab by mouth at bedtime as needed for Sleep for up to 30 days. 30 Tab 0   • ALBUTEROL INH Inhale  by mouth.     • sertraline (ZOLOFT) 25 MG tablet Take 1 Tab by mouth every day. 90 Tab 1   • Polyvinyl Alcohol-Povidone (REFRESH OP) Place 1 Drop in both eyes 4 times a day as needed.     • omeprazole (PRILOSEC) 10 MG CAPSULE DELAYED RELEASE Take 1 Cap by mouth 2 times a day. 180 Cap 1   • losartan (COZAAR) 25 MG Tab Take 1 Tab by mouth 2 Times a Day. (Patient taking differently: Take 75 mg by mouth 2 Times a Day.) 180 Tab 1   • Cholecalciferol (VITAMIN D) 2000 UNIT Tab Take 4,000 Units by mouth every day.       No current facility-administered medications for  "this visit.     \"CURRENT RX\"      Allergies: Ace inhibitors; Ceclor [cefaclor]; Ceftin; Cephalosporins; Ciprofloxacin; Kenalog; Lamisil [terbinafine hcl]; Latex; Merthiolate [thimerosal]; Monistat [tioconazole]; Pcn [penicillins]; Trazodone hcl; Benzalkonium chloride; and Tetracyclines      ROS  Change from prior.      Physical Exam:   /80 (BP Location: Right arm, Patient Position: Sitting, BP Cuff Size: Adult)   Pulse 68   Resp 16   Ht 1.651 m (5' 5\")   Wt 76.2 kg (168 lb)   LMP  (LMP Unknown)   SpO2 94%   BMI 27.96 kg/m²    Limited by the telemedicine interface.  She is a well-developed, well-nourished woman who is alert, oriented and appropriately responsive.  She does not appear dyspneic and is not coughing.  She is wearing a mask.      Problems:  1. Obstructive sleep apnea syndrome  She has severe obstructive sleep apnea hypopnea documented by polysomnography with an apnea hypopnea index of 78.7 events per hour and a lowest arterial oxygen saturation of 76%.  Her previous titration polysomnograms had been complicated by treatment emergent central apnea but she was unable to acclimate to ASV therapy.  Her most recent split-night polysomnogram demonstrated an excellent response to CPAP at the low pressure of 6 cm of water.  In retrospect her treatment emergent central apnea may have been a transient phenomenon or an overtreatment effect.  After reviewing the recent polysomnogram it seems quite likely that her sleep disordered breathing will be effectively treated with CPAP at 6 cm water pressure.  She is having difficulties in acclimating to the treatment but this does not seem to be related to mask fit.  She attributes her awakenings with anxiety and near panic as an expression of her known anxiety issues and she hopes that the reinstitution of Zoloft will help with that problem.  She asks about the short-term use of other antianxiety agents.  We talked about the risks and benefits of medications, " particularly benzodiazepines, including the risks of daytime grogginess, unusual nighttime activity, accidental falls and habituation.  As a short-term bridge during adjustment to CPAP therapy I think that low-dose alprazolam would be reasonable.  We have also talked about referral to psychology for CPAP desensitization and she is willing to anticipate in that process as well.    2. Mild intermittent asthma without complication  Controlled with minimal use of the rescue inhaler.  She does not seem to have complicating factors such as infection.    3. Essential hypertension  Controlled with a blood pressure 112/80 m of mercury today.    4. Anxiety  As discussed above.  She has restarted the Zoloft for Dr. Arguello.      Plan:   1.  Continue CPAP at 6 cm of water pressure with the Simplus mask.  We have talked about acclimating to the CPAP with proper mask fit, effective humidification and consistent use.    2.  Alprazolam 0.25 mg nightly as needed with 30 dispensed and no refills.    3.  Referral to Bonny Mcleod, PhD, for CPAP desensitization.    4.  Return visit here in about 3 months, bringing the data recording chip from her new CPAP machine.  She will message us with any issues before then.    We appreciate the opportunity to assist in her care.    Return in about 3 months (around 9/2/2020).

## 2020-06-03 PROBLEM — G47.33 OSA (OBSTRUCTIVE SLEEP APNEA): Status: ACTIVE | Noted: 2020-06-03

## 2020-06-08 ENCOUNTER — OFFICE VISIT (OUTPATIENT)
Dept: CARDIOLOGY | Facility: MEDICAL CENTER | Age: 75
End: 2020-06-08
Payer: MEDICARE

## 2020-06-08 ENCOUNTER — PATIENT MESSAGE (OUTPATIENT)
Dept: SLEEP MEDICINE | Facility: MEDICAL CENTER | Age: 75
End: 2020-06-08

## 2020-06-08 VITALS
OXYGEN SATURATION: 96 % | RESPIRATION RATE: 16 BRPM | WEIGHT: 166.01 LBS | SYSTOLIC BLOOD PRESSURE: 128 MMHG | HEIGHT: 65 IN | DIASTOLIC BLOOD PRESSURE: 86 MMHG | BODY MASS INDEX: 27.66 KG/M2 | HEART RATE: 66 BPM

## 2020-06-08 DIAGNOSIS — G47.33 OSA (OBSTRUCTIVE SLEEP APNEA): ICD-10-CM

## 2020-06-08 DIAGNOSIS — I34.0 MILD MITRAL REGURGITATION: ICD-10-CM

## 2020-06-08 DIAGNOSIS — I10 ESSENTIAL HYPERTENSION: ICD-10-CM

## 2020-06-08 DIAGNOSIS — R00.2 PALPITATIONS: ICD-10-CM

## 2020-06-08 PROCEDURE — 99203 OFFICE O/P NEW LOW 30 MIN: CPT | Performed by: INTERNAL MEDICINE

## 2020-06-08 ASSESSMENT — FIBROSIS 4 INDEX: FIB4 SCORE: 1.99

## 2020-06-08 NOTE — PROGRESS NOTES
CARDIOLOGY NEW PATIENT CONSULTATION    PCP: Sabra Arguello M.D.    1. Palpitations  Nocturnal.  I suspect are related to sleep apnea.  Unfortunately, I do not know if these corresponded arrhythmias while she was monitored during polysomnogram as well as telemetry.  -Expectant management.  If symptoms persist we can pursue additional cardiac diagnostics    2. VALERIE (obstructive sleep apnea)  After many difficulty she has finally gotten a prescription for CPAP, currently without oxygen.  This is improved her sleep and symptoms.  Asked that she continue to monitor symptoms and if they return or become more troubling we will evaluate them further    3. Mild mitral regurgitation  Mild and stable over many years per her report    4. Essential hypertension  Well-controlled.  No changes advised      Follow up with myself as needed      Chief Complaint   Patient presents with   • Hypertension       History: Martha Ordonez is a 74 y.o. female with a past medical history of Mild mitral regurgitation presenting for consultation regarding palpitations.  Symptoms of been present for the past several weeks.  They are felt exclusively at night.  She feels a forceful heartbeat and adrenaline surge emanating from the center of her chest and radiating upwards.  She was evaluated in the emergency department and even observed overnight.  Cardiac biomarkers were not detected and no serious arrhythmias were seen overnight though the patient does report she had PVCs and intermittent junctional rhythms.  She recently started CPAP at 6 cmH2O and feels that this has made an improvement of her symptoms, though she is only been using the therapy for about a week.  During the daytime she walks 1 hour and cares for her grandchildren ages 8 and 10 without cardiovascular symptoms.     She relates a long and winding course obtaining appropriate therapy for sleep apnea.  She relates that she was misdiagnosed with central sleep apnea and prescribed  "ASV which produced any problems.    She is a former cardiac critical care nurse.    ROS:  All other systems reviewed and negative except as per the HPI    PE:  /86 (BP Location: Right arm, Patient Position: Sitting, BP Cuff Size: Adult)   Pulse 66   Resp 16   Ht 1.651 m (5' 5\")   Wt 75.3 kg (166 lb 0.1 oz)   LMP  (LMP Unknown)   SpO2 96%   BMI 27.62 kg/m²   GEN: Well appearing  HEENT: Symmetric face. Anicteric sclerae. Moist mucus membranes  NECK: No JVD. No lymphadenopathy  CARDIAC: Normal PMI, regular, normal S1, S2.  VASCULATURE: Normal carotid amplitude without bruit.   RESP: Clear to auscultation bilaterally  ABD: Soft, non-tender, non-distended  EXT: No edema, no clubbing or cyanosis  SKIN: Warm and dry  NEURO: No gross deficits  PSYCH: Appropriate affect, participates in conversation    Past Medical History:   Diagnosis Date   • Abnormal thyroid function test 9/10/2014   • Allergic rhinitis    • Anxiety    • Asthma    • Back pain    • Basal cell carcinoma     squamos cell right ear   • Bronchitis    • Cardiac arrhythmia    • Depression    • Elevated liver enzymes 9/10/2014   • Family history of melanoma    • GERD (gastroesophageal reflux disease)    • Glaucoma suspect of both eyes    • Hyperlipidemia    • Hypertension    • Nasal drainage    • OCD (obsessive compulsive disorder)    • Pancreatitis    • Parathyroid disorder (HCC)    • PCOS (polycystic ovarian syndrome)    • S/P ERCP    • Transient global amnesia      Past Surgical History:   Procedure Laterality Date   • ABDOMINAL HYSTERECTOMY TOTAL      ovaries remain   • ADENOIDECTOMY     • CARDIAC CATH, RIGHT HEART      normal, EKG was Abnormal    • CHOLECYSTECTOMY     • DILATION AND CURETTAGE     • EYE SURGERY      b/l iridotomy   • HYSTERECTOMY LAPAROSCOPY     • LUMPECTOMY      benign   • TONSILLECTOMY     • US-NEEDLE CORE BX-BREAST PANEL       Allergies   Allergen Reactions   • Ace Inhibitors      Cough   • Ceclor [Cefaclor] Hives   • Ceftin " "Hives   • Cephalosporins Rash   • Ciprofloxacin Rash   • Clarithromycin      Arrhythmias. Able to take other -mycins.    • Kenalog Hives   • Lamisil [Terbinafine Hcl]    • Latex Rash   • Merthiolate [Thimerosal] Hives   • Monistat [Tioconazole] Itching   • Pcn [Penicillins] Hives   • Simvastatin      Myalgia   • Trazodone Hcl      \"wired\", \"felt really hot\"   • Benzalkonium Chloride Rash   • Tetracyclines Rash     Outpatient Encounter Medications as of 6/8/2020   Medication Sig Dispense Refill   • Multiple Vitamins-Minerals (CENTRUM SILVER 50+WOMEN PO) Take  by mouth every day.     • ALBUTEROL INH Inhale  by mouth.     • sertraline (ZOLOFT) 25 MG tablet Take 1 Tab by mouth every day. 90 Tab 1   • Polyvinyl Alcohol-Povidone (REFRESH OP) Place 1 Drop in both eyes 4 times a day as needed.     • omeprazole (PRILOSEC) 10 MG CAPSULE DELAYED RELEASE Take 1 Cap by mouth 2 times a day. 180 Cap 1   • losartan (COZAAR) 25 MG Tab Take 1 Tab by mouth 2 Times a Day. (Patient taking differently: Take 75 mg by mouth See Admin Instructions. 25 mg in the AM, 50 mg QHS) 180 Tab 1   • Cholecalciferol (VITAMIN D) 2000 UNIT Tab Take 4,000 Units by mouth every day.     • [DISCONTINUED] ALPRAZolam (XANAX) 0.25 MG Tab Take 1 Tab by mouth at bedtime as needed for Sleep for up to 30 days. 30 Tab 0     No facility-administered encounter medications on file as of 6/8/2020.      Social History     Socioeconomic History   • Marital status:      Spouse name: Not on file   • Number of children: Not on file   • Years of education: Not on file   • Highest education level: Not on file   Occupational History   • Occupation: RN- retired   Social Needs   • Financial resource strain: Not on file   • Food insecurity     Worry: Not on file     Inability: Not on file   • Transportation needs     Medical: Not on file     Non-medical: Not on file   Tobacco Use   • Smoking status: Never Smoker   • Smokeless tobacco: Never Used   Substance and Sexual " Activity   • Alcohol use: No     Alcohol/week: 0.0 oz   • Drug use: No   • Sexual activity: Yes     Partners: Male     Comment: , retired RN   Lifestyle   • Physical activity     Days per week: Not on file     Minutes per session: Not on file   • Stress: Not on file   Relationships   • Social connections     Talks on phone: Not on file     Gets together: Not on file     Attends [employer] service: Not on file     Active member of club or organization: Not on file     Attends meetings of clubs or organizations: Not on file     Relationship status: Not on file   • Intimate partner violence     Fear of current or ex partner: Not on file     Emotionally abused: Not on file     Physically abused: Not on file     Forced sexual activity: Not on file   Other Topics Concern   •  Service No   • Blood Transfusions No   • Caffeine Concern No   • Occupational Exposure No   • Hobby Hazards No   • Sleep Concern Yes   • Stress Concern No   • Weight Concern Yes   • Special Diet Yes   • Back Care No   • Exercise Yes   • Bike Helmet Yes   • Seat Belt Yes   • Self-Exams Yes   Social History Narrative    , 2 children- daughters.          Family History   Problem Relation Age of Onset   • Hypertension Mother    • Heart Disease Father         CHF   • Cancer Father         melanoma   • Hypertension Sister    • Stroke Sister    • Sleep Apnea Sister    • Cancer Paternal Grandfather         colon   • Heart Disease Paternal Grandfather    • Stroke Paternal Grandfather    • Diabetes Paternal Grandfather    • Other Maternal Grandmother         eplepsey   • Heart Attack Maternal Grandfather    • Stroke Maternal Grandfather    • Stroke Paternal Grandmother    • Sleep Apnea Sister          Studies  Lab Results   Component Value Date/Time    CHOLSTRLTOT 179 11/21/2019 10:07 AM    LDL 94 11/21/2019 10:07 AM    HDL 68 11/21/2019 10:07 AM    TRIGLYCERIDE 87 11/21/2019 10:07 AM       Lab Results   Component Value Date/Time    SODIUM  140 05/25/2020 03:00 AM    POTASSIUM 4.1 05/25/2020 03:00 AM    CHLORIDE 102 05/25/2020 03:00 AM    CO2 24 05/25/2020 03:00 AM    GLUCOSE 115 (H) 05/25/2020 03:00 AM    BUN 16 05/25/2020 03:00 AM    CREATININE 0.79 05/25/2020 03:00 AM     Lab Results   Component Value Date/Time    ALKPHOSPHAT 140 (H) 05/25/2020 03:00 AM    ASTSGOT 24 05/25/2020 03:00 AM    ALTSGPT 18 05/25/2020 03:00 AM    TBILIRUBIN 0.8 05/25/2020 03:00 AM        For this encounter I directly reviewed ECG tracings and medical records I agree with the interpretations in the electronic health record

## 2020-06-09 NOTE — PATIENT COMMUNICATION
Called [facility] and spoke to Viry who informed me they are just needing the most recent split night study for the pt. I [facility] the testing [email or web address] and I also faxed the testing as well.

## 2020-06-18 ENCOUNTER — PATIENT MESSAGE (OUTPATIENT)
Dept: SLEEP MEDICINE | Facility: MEDICAL CENTER | Age: 75
End: 2020-06-18

## 2020-06-18 ENCOUNTER — TELEPHONE (OUTPATIENT)
Dept: SLEEP MEDICINE | Facility: MEDICAL CENTER | Age: 75
End: 2020-06-18

## 2020-06-18 DIAGNOSIS — G47.33 OSA (OBSTRUCTIVE SLEEP APNEA): ICD-10-CM

## 2020-06-18 NOTE — TELEPHONE ENCOUNTER
Patient called requesting order for DME OPO to be faxed to DME:  [facility] / ph [phone] / fax [phone]    Patient states AHI has increased since starting on her new pap.     7 Day compliance has been uploaded to patient's media    Please Advise    I will be out of the office the rest of the day and will return tomorrow 06/19/20

## 2020-06-30 RX ORDER — LOSARTAN POTASSIUM 25 MG/1
TABLET ORAL
Qty: 270 TAB | Refills: 1 | Status: SHIPPED | OUTPATIENT
Start: 2020-06-30 | End: 2021-01-29

## 2020-07-02 ENCOUNTER — HOSPITAL ENCOUNTER (OUTPATIENT)
Dept: LAB | Facility: MEDICAL CENTER | Age: 75
End: 2020-07-02
Attending: FAMILY MEDICINE
Payer: MEDICARE

## 2020-07-02 DIAGNOSIS — E55.9 VITAMIN D INSUFFICIENCY: ICD-10-CM

## 2020-07-02 DIAGNOSIS — R74.8 ELEVATED ALKALINE PHOSPHATASE LEVEL: ICD-10-CM

## 2020-07-02 LAB
25(OH)D3 SERPL-MCNC: 62 NG/ML (ref 30–100)
CALCIUM SERPL-MCNC: 9.6 MG/DL (ref 8.5–10.5)
PTH-INTACT SERPL-MCNC: 54 PG/ML (ref 14–72)

## 2020-07-02 PROCEDURE — 84080 ASSAY ALKALINE PHOSPHATASES: CPT

## 2020-07-02 PROCEDURE — 82306 VITAMIN D 25 HYDROXY: CPT

## 2020-07-02 PROCEDURE — 36415 COLL VENOUS BLD VENIPUNCTURE: CPT

## 2020-07-02 PROCEDURE — 83970 ASSAY OF PARATHORMONE: CPT

## 2020-07-02 PROCEDURE — 84075 ASSAY ALKALINE PHOSPHATASE: CPT

## 2020-07-07 LAB
ALP BONE SERPL-CCNC: 56 U/L (ref 0–55)
ALP ISOS SERPL HS-CCNC: 0 U/L
ALP LIVER SERPL-CCNC: 68 U/L (ref 0–94)
ALP SERPL-CCNC: 124 U/L (ref 40–120)

## 2020-07-08 ENCOUNTER — TELEMEDICINE (OUTPATIENT)
Dept: MEDICAL GROUP | Facility: IMAGING CENTER | Age: 75
End: 2020-07-08
Payer: MEDICARE

## 2020-07-08 VITALS
HEIGHT: 65 IN | WEIGHT: 163 LBS | SYSTOLIC BLOOD PRESSURE: 107 MMHG | BODY MASS INDEX: 27.16 KG/M2 | HEART RATE: 58 BPM | DIASTOLIC BLOOD PRESSURE: 80 MMHG

## 2020-07-08 DIAGNOSIS — R74.8 ELEVATED ALKALINE PHOSPHATASE LEVEL: ICD-10-CM

## 2020-07-08 DIAGNOSIS — I27.20 MILD PULMONARY HYPERTENSION (HCC): ICD-10-CM

## 2020-07-08 DIAGNOSIS — G47.33 OSA (OBSTRUCTIVE SLEEP APNEA): ICD-10-CM

## 2020-07-08 PROCEDURE — 99214 OFFICE O/P EST MOD 30 MIN: CPT | Mod: 95,CR | Performed by: FAMILY MEDICINE

## 2020-07-08 RX ORDER — AZITHROMYCIN MONOHYDRATE 10 MG/ML
SOLUTION/ DROPS OPHTHALMIC
COMMUNITY
Start: 2020-07-06

## 2020-07-08 ASSESSMENT — FIBROSIS 4 INDEX: FIB4 SCORE: 2.02

## 2020-07-08 NOTE — PROGRESS NOTES
Telemedicine Visit: Established Patient     This encounter was conducted via Zoom .   Verbal consent was obtained. Patient's identity was verified.    Subjective:     Chief Complaint   Patient presents with   • Lab Results     Martha Ordonez is a 75 y.o. female with hypertension, hypercholesterolemia, sleep apnea, history of PAC and PVCs, anxiety, mild asthma and GERD presenting for follow up of lab results for elevated alkaline phosphatase levels and has questions about last echocardiogram. Patient is a retired RN.      Sleep apnea- She has been on CPAP for about 6 weeks, overall doing well. Some nights are a little more difficult. On recent repeat testing, found to have more VALERIE rather than central sleep apnea. States she had a good sleep tech with appropriate calibration prior to testing.   States her AHI has come down with use of CPAP.   Has appointment with sleep psychologist, Bonny Mcleod, for mid July.   Sometimes uses benadryl or tylenol at night which helps with sleep.   Spoke with Dr. Sandhu, pulmonary, found it was beneficial.  Has noticed mprovement in how she feels with use of CPAP.   Discussed calibration with pulmonary. Had some further testing with CPAP company awaiting results for consideration of additional oxygen. States she has had a good experience with the company. She will plan to discuss with pulmonary.     Elevated alkaline phosphatase levels- improvement since her hospital visit in May. Minimally elevated on recent labs.   Does regular walking. States she always has some back and shoulder pain symptoms, but that is not new. No recent injuries or falls. No unusual aches/ pains. Has had prior history of elevated PTH- recent labs normal with normal calcium.     Saw cardiology. Previously had echocardiogram with mild pulmonary hypertension. BP has been controlled.       ROS:   Denies any recent fevers or chills. No nausea or vomiting. No chest pains or shortness of breath.     Allergies  "  Allergen Reactions   • Ace Inhibitors      Cough   • Ceclor [Cefaclor] Hives   • Ceftin Hives   • Cephalosporins Rash   • Ciprofloxacin Rash   • Clarithromycin      Arrhythmias. Able to take other -mycins.    • Kenalog Hives   • Lamisil [Terbinafine Hcl]    • Latex Rash   • Merthiolate [Thimerosal] Hives   • Monistat [Tioconazole] Itching   • Pcn [Penicillins] Hives   • Simvastatin      Myalgia   • Trazodone Hcl      \"wired\", \"felt really hot\"   • Benzalkonium Chloride Rash   • Tetracyclines Rash       Current medicines (including changes today)  Current Outpatient Medications   Medication Sig Dispense Refill   • AZASITE 1 % ophthalmic solution      • losartan (COZAAR) 25 MG Tab Take 50 mg in morning and 25 mg in evening. 270 Tab 1   • Multiple Vitamins-Minerals (CENTRUM SILVER 50+WOMEN PO) Take  by mouth every day.     • ALBUTEROL INH Inhale  by mouth.     • sertraline (ZOLOFT) 25 MG tablet Take 1 Tab by mouth every day. 90 Tab 1   • Polyvinyl Alcohol-Povidone (REFRESH OP) Place 1 Drop in both eyes 4 times a day as needed.     • omeprazole (PRILOSEC) 10 MG CAPSULE DELAYED RELEASE Take 1 Cap by mouth 2 times a day. 180 Cap 1   • Cholecalciferol (VITAMIN D) 2000 UNIT Tab Take 4,000 Units by mouth every day.       No current facility-administered medications for this visit.        Patient Active Problem List    Diagnosis Date Noted   • VALERIE (obstructive sleep apnea) 06/03/2020   • Incomplete emptying of bladder 03/04/2019   • Chronic right-sided low back pain with right-sided sciatica 01/16/2019   • Pelvic pain 01/16/2019   • Migraine without aura and without status migrainosus, not intractable 08/16/2018   • Complex sleep apnea syndrome 03/23/2018   • PVCs (premature ventricular contractions) 09/04/2017   • PAC (premature atrial contraction) 09/04/2017   • Nocturnal oxygen desaturation 08/31/2017   • Elevated alkaline phosphatase level 06/29/2016   • Increased PTH level 05/04/2016   • Vitamin D insufficiency " "05/04/2016   • Environmental allergies 02/21/2016   • OCD (obsessive compulsive disorder)    • Family history of melanoma 02/12/2016   • Fatigue 12/31/2015   • Mild asthma 09/10/2014   • Pure hypercholesterolemia 09/10/2014   • Essential hypertension 09/10/2014   • Anxiety 09/10/2014   • Transient global amnesia 09/10/2014   • Gastroesophageal reflux disease without esophagitis 09/10/2014       Family History   Problem Relation Age of Onset   • Hypertension Mother    • Heart Disease Father         CHF   • Cancer Father         melanoma   • Hypertension Sister    • Stroke Sister    • Sleep Apnea Sister    • Cancer Paternal Grandfather         colon   • Heart Disease Paternal Grandfather    • Stroke Paternal Grandfather    • Diabetes Paternal Grandfather    • Other Maternal Grandmother         eplepsey   • Heart Attack Maternal Grandfather    • Stroke Maternal Grandfather    • Stroke Paternal Grandmother    • Sleep Apnea Sister        She  has a past medical history of Abnormal thyroid function test (9/10/2014), Allergic rhinitis, Anxiety, Asthma, Back pain, Basal cell carcinoma, Bronchitis, Cardiac arrhythmia, Depression, Elevated liver enzymes (9/10/2014), Family history of melanoma, GERD (gastroesophageal reflux disease), Glaucoma suspect of both eyes, Hyperlipidemia, Hypertension, Nasal drainage, OCD (obsessive compulsive disorder), Pancreatitis, Parathyroid disorder (HCC), PCOS (polycystic ovarian syndrome), S/P ERCP, and Transient global amnesia.  She  has a past surgical history that includes cholecystectomy; tonsillectomy; lumpectomy; abdominal hysterectomy total; cardiac cath, right heart; eye surgery; us-needle core bx-breast panel; hysterectomy laparoscopy; dilation and curettage; and adenoidectomy.       Objective:   /80   Pulse (!) 58   Ht 1.651 m (5' 5\")   Wt 73.9 kg (163 lb)   LMP  (LMP Unknown)   BMI 27.12 kg/m²     Physical Exam:  Constitutional: Alert, no distress, well-groomed.  Skin: No " rashes in visible areas.  Eye: Round. Conjunctiva clear, lids normal. No icterus.   ENMT: Lips pink without lesions, good dentition, moist mucous membranes. Phonation normal.  Neck: No masses, no thyromegaly. Moves freely without pain.  CV: Pulse as reported by patient  Respiratory: Unlabored respiratory effort, no cough or audible wheeze  Psych: Alert and oriented x3, normal affect and mood.      Ref. Range 7/2/2020 09:49   Calcium Latest Ref Range: 8.5 - 10.5 mg/dL 9.6   Alkaline Phosphatase Latest Ref Range: 40 - 120 U/L 124 (H)   Bone Fractions Latest Ref Range: 0 - 55 U/L 56 (H)   Liver Fractions Latest Ref Range: 0 - 94 U/L 68   Alk Phos Other Calc Latest Units: U/L 0        Ref. Range 11/21/2019 10:07 5/23/2020 15:17 5/25/2020 03:00   Alkaline Phosphatase Latest Ref Range: 30 - 99 U/L 101 (H) 137 (H) 140 (H)       5/25/2020  Echocardiography Laboratory  CONCLUSIONS  Normal left ventricular systolic function.  Left ventricular ejection fraction is visually estimated to be 70%.  Mild mitral regurgitation.  Right heart pressures are consistent with mild pulmonary hypertension.      Assessment and Plan:   The following treatment plan was discussed:     75 y.o. female with hypertension, hypercholesterolemia, sleep apnea, history of PAC and PVCs, anxiety, mild asthma and GERD.     1. Elevated alkaline phosphatase level - appears improving compared to 5/2020 labs. Minimally elevated total and bone fractions noted on recent labs. Patient with history of elevated PTH in past, recent labs normal. Patient without any unusual symptoms at this time.   -Discussed options of further evaluation at this time. Will plan to recheck labs in one month as they appear to be improving. If no further improvements or increase discussed completing bone scan for further evaluation. Patient agreeable with plan, she will notify us if any new symptoms.  ALKALINE PHOSPHATASE ISOENZYMES   2. VALERIE (obstructive sleep apnea)- stable on CPAP.    -She will continue on CPAP. Follow up with pulmonary and sleep psychology.      3. Mild pulmonary hypertension (HCC) - reviewed findings of echocardiogram, likely associated with VALERIE. Patient has seen cardiology. Appears stable.   -Recommend routine and continued use of CPAP. Maintain good BP control. Will monitor in future. Follow up with cardiology as recommended.         Follow-up: Return in about 1 month (around 8/8/2020).         This medical record contains text that has been entered with the assistance of computer voice recognition and dictation software.  Therefore, it may contain unintended errors in text, spelling, punctuation, or grammar.

## 2020-07-30 ENCOUNTER — HOSPITAL ENCOUNTER (OUTPATIENT)
Dept: LAB | Facility: MEDICAL CENTER | Age: 75
End: 2020-07-30
Attending: FAMILY MEDICINE
Payer: MEDICARE

## 2020-07-30 ENCOUNTER — PATIENT MESSAGE (OUTPATIENT)
Dept: SLEEP MEDICINE | Facility: MEDICAL CENTER | Age: 75
End: 2020-07-30

## 2020-07-30 DIAGNOSIS — R74.8 ELEVATED ALKALINE PHOSPHATASE LEVEL: ICD-10-CM

## 2020-07-30 DIAGNOSIS — G47.34 SLEEP RELATED HYPOXIA: ICD-10-CM

## 2020-07-30 DIAGNOSIS — G47.33 OSA (OBSTRUCTIVE SLEEP APNEA): ICD-10-CM

## 2020-07-30 PROCEDURE — 36415 COLL VENOUS BLD VENIPUNCTURE: CPT

## 2020-07-30 PROCEDURE — 84080 ASSAY ALKALINE PHOSPHATASES: CPT

## 2020-07-30 PROCEDURE — 84075 ASSAY ALKALINE PHOSPHATASE: CPT

## 2020-07-30 NOTE — TELEPHONE ENCOUNTER
From: Martha Ordonez  To: Harshad Mason M.D.  Sent: 2020 12:40 PM PDT  Subject: Prescription Question    I'm having good results using oxygen along with CPAP. The [employer] RT compared the data from the last 2 weeks to the previous 2 weeks:  1. AHI improved from average of 9.4 to 6.3.  2. Central apneas improved from average of 4.6 to 3.9.   3. AHI continues to improve--last 2 nights were 5.4 and 5.1.   4. I fall asleep faster, get more sleep & feel better during the day.  I use the CPAP each night, all night. I want to visit my 98 yr old mother who is in poor health but need an FAA approved portable concentrator to travel. I will pay for it out of pocket (No Medicare or Insurance). My ER order has . Please provide an order for nighttime oxygen at 2L so I can purchase.   Thank you for everything-especially your patience.   Martha Ordonez

## 2020-07-31 ENCOUNTER — PATIENT MESSAGE (OUTPATIENT)
Dept: SLEEP MEDICINE | Facility: MEDICAL CENTER | Age: 75
End: 2020-07-31

## 2020-08-03 LAB
ALP BONE SERPL-CCNC: 65 U/L (ref 0–55)
ALP ISOS SERPL HS-CCNC: 0 U/L
ALP LIVER SERPL-CCNC: 60 U/L (ref 0–94)
ALP SERPL-CCNC: 125 U/L (ref 40–120)

## 2020-08-06 ENCOUNTER — TELEMEDICINE (OUTPATIENT)
Dept: MEDICAL GROUP | Facility: IMAGING CENTER | Age: 75
End: 2020-08-06
Payer: MEDICARE

## 2020-08-06 VITALS
SYSTOLIC BLOOD PRESSURE: 121 MMHG | HEART RATE: 76 BPM | BODY MASS INDEX: 29.12 KG/M2 | WEIGHT: 175 LBS | TEMPERATURE: 97.8 F | DIASTOLIC BLOOD PRESSURE: 76 MMHG

## 2020-08-06 DIAGNOSIS — R74.8 ELEVATED ALKALINE PHOSPHATASE LEVEL: ICD-10-CM

## 2020-08-06 DIAGNOSIS — G47.33 OSA (OBSTRUCTIVE SLEEP APNEA): ICD-10-CM

## 2020-08-06 DIAGNOSIS — Z86.39 HISTORY OF HYPERPARATHYROIDISM: ICD-10-CM

## 2020-08-06 PROCEDURE — 99213 OFFICE O/P EST LOW 20 MIN: CPT | Mod: 95,CR | Performed by: FAMILY MEDICINE

## 2020-08-06 ASSESSMENT — FIBROSIS 4 INDEX: FIB4 SCORE: 2.02

## 2020-08-06 ASSESSMENT — PAIN SCALES - GENERAL: PAINLEVEL: NO PAIN

## 2020-08-06 NOTE — PROGRESS NOTES
Telemedicine Visit: Established Patient     This encounter was conducted via Zoom .   Verbal consent was obtained. Patient's identity was verified.    Subjective:     Chief Complaint   Patient presents with   • Lab Results       75 y.o. female with hypertension, hypercholesterolemia, sleep apnea, history of PAC and PVCs, history of elevated PTH levels, anxiety, mild asthma and GERD presenting for follow-up of elevated alkaline phosphatase levels.    Had repeat alkaline phosphatase levels recently which still appears elevated, alkaline phosphatase isoenzymes with further increase in bone fractions noted.  Patient does have a prior history of elevated PTH levels which were within normal range on recent lab work.  Denies any unusual weight loss, fevers or chills.  Denies any unusual aches or pains.  She did see endocrinology in 2016 for evaluation.  She was being followed at [facility] endocrinology for period of time, thought possibly from primary hyperparathyroidism.  Was advised to monitor.  If needed surgical procedure she desires to go to [place].  Notes that in April she was walking and a dog barked nearby and she jerked her neck and had some neck pain after work.  She currently does not have any neck issues.  She has been walking a lot and has some plantar fasciitis.  She also notes doing some cycling.  Denies any other significant injuries.  Hopes to go visit her mother in [place] who has vascular dementia, CHF and osteoporosis, mother is 97 yo.    Sleep apnea-states that she has been overall doing well with her current CPAP machine.  Notes her AHI has improved.      ROS   Denies any recent fevers or chills. No nausea or vomiting. No chest pains or shortness of breath.     Allergies   Allergen Reactions   • Ace Inhibitors      Cough   • Ceclor [Cefaclor] Hives   • Ceftin Hives   • Cephalosporins Rash   • Ciprofloxacin Rash   • Clarithromycin      Arrhythmias. Able to take other -mycins.    • Kenalog Hives  "  • Lamisil [Terbinafine Hcl]    • Latex Rash   • Merthiolate [Thimerosal] Hives   • Monistat [Tioconazole] Itching   • Pcn [Penicillins] Hives   • Simvastatin      Myalgia   • Trazodone Hcl      \"wired\", \"felt really hot\"   • Benzalkonium Chloride Rash   • Tetracyclines Rash       Current medicines (including changes today)  Current Outpatient Medications   Medication Sig Dispense Refill   • AZASITE 1 % ophthalmic solution      • losartan (COZAAR) 25 MG Tab Take 50 mg in morning and 25 mg in evening. 270 Tab 1   • Multiple Vitamins-Minerals (CENTRUM SILVER 50+WOMEN PO) Take  by mouth every day.     • ALBUTEROL INH Inhale  by mouth.     • sertraline (ZOLOFT) 25 MG tablet Take 1 Tab by mouth every day. 90 Tab 1   • Polyvinyl Alcohol-Povidone (REFRESH OP) Place 1 Drop in both eyes 4 times a day as needed.     • omeprazole (PRILOSEC) 10 MG CAPSULE DELAYED RELEASE Take 1 Cap by mouth 2 times a day. 180 Cap 1   • Cholecalciferol (VITAMIN D) 2000 UNIT Tab Take 4,000 Units by mouth every day.       No current facility-administered medications for this visit.        Patient Active Problem List    Diagnosis Date Noted   • Mild pulmonary hypertension (HCC) 07/08/2020   • VALERIE (obstructive sleep apnea) 06/03/2020   • Incomplete emptying of bladder 03/04/2019   • Chronic right-sided low back pain with right-sided sciatica 01/16/2019   • Pelvic pain 01/16/2019   • Migraine without aura and without status migrainosus, not intractable 08/16/2018   • Complex sleep apnea syndrome 03/23/2018   • PVCs (premature ventricular contractions) 09/04/2017   • PAC (premature atrial contraction) 09/04/2017   • Nocturnal oxygen desaturation 08/31/2017   • Elevated alkaline phosphatase level 06/29/2016   • Increased PTH level 05/04/2016   • Vitamin D insufficiency 05/04/2016   • Environmental allergies 02/21/2016   • OCD (obsessive compulsive disorder)    • Family history of melanoma 02/12/2016   • Fatigue 12/31/2015   • Mild asthma 09/10/2014 "   • Pure hypercholesterolemia 09/10/2014   • Essential hypertension 09/10/2014   • Anxiety 09/10/2014   • Transient global amnesia 09/10/2014   • Gastroesophageal reflux disease without esophagitis 09/10/2014       Family History   Problem Relation Age of Onset   • Hypertension Mother    • Heart Disease Father         CHF   • Cancer Father         melanoma   • Hypertension Sister    • Stroke Sister    • Sleep Apnea Sister    • Cancer Paternal Grandfather         colon   • Heart Disease Paternal Grandfather    • Stroke Paternal Grandfather    • Diabetes Paternal Grandfather    • Other Maternal Grandmother         eplepsey   • Heart Attack Maternal Grandfather    • Stroke Maternal Grandfather    • Stroke Paternal Grandmother    • Sleep Apnea Sister      She  has a past medical history of Abnormal thyroid function test (9/10/2014), Allergic rhinitis, Anxiety, Asthma, Back pain, Basal cell carcinoma, Bronchitis, Cardiac arrhythmia, Depression, Elevated liver enzymes (9/10/2014), Family history of melanoma, GERD (gastroesophageal reflux disease), Glaucoma suspect of both eyes, Hyperlipidemia, Hypertension, Nasal drainage, OCD (obsessive compulsive disorder), Pancreatitis, Parathyroid disorder (HCC), PCOS (polycystic ovarian syndrome), S/P ERCP, and Transient global amnesia.  She  has a past surgical history that includes cholecystectomy; tonsillectomy; lumpectomy; abdominal hysterectomy total; cardiac cath, right heart; eye surgery; us-needle core bx-breast panel; hysterectomy laparoscopy; dilation and curettage; and adenoidectomy.       Objective:   Vitals obtained by patient:  /76   Pulse 76   Temp 36.6 °C (97.8 °F)   Wt 79.4 kg (175 lb)   LMP  (LMP Unknown)   BMI 29.12 kg/m²     Physical Exam:  Constitutional: Alert, no distress, well-groomed.  Skin: No rashes in visible areas.  Eye: Round. Conjunctiva clear, lids normal. No icterus.   ENMT: Lips pink without lesions, good dentition, moist mucous membranes.  Phonation normal.  Neck: No masses, no thyromegaly. Moves freely without pain.  CV: Pulse as reported by patient  Respiratory: Unlabored respiratory effort, no cough or audible wheeze  Psych: Alert and oriented x3, normal affect and mood.        Ref. Range 7/2/2020 09:49 7/30/2020 09:59   Calcium Latest Ref Range: 8.5 - 10.5 mg/dL 9.6    Alkaline Phosphatase Latest Ref Range: 40 - 120 U/L 124 (H) 125 (H)   Bone Fractions Latest Ref Range: 0 - 55 U/L 56 (H) 65 (H)   Liver Fractions Latest Ref Range: 0 - 94 U/L 68 60   Alk Phos Other Calc Latest Units: U/L 0 0   25-Hydroxy   Vitamin D 25 Latest Ref Range: 30 - 100 ng/mL 62    Pth, Intact Latest Ref Range: 14.0 - 72.0 pg/mL 54.0          Assessment and Plan:   The following treatment plan was discussed:       75 y.o. female with hypertension, hypercholesterolemia, sleep apnea, history of PAC and PVCs, history of elevated PTH levels, anxiety, mild asthma and GERD.    1. Elevated alkaline phosphatase level  NM-BONE/JOINT SCAN WHOLE BODY   2. History of hyperparathyroidism     3. VALERIE (obstructive sleep apnea)     Elevated alkaline phosphatase level with history of hyperparathyroidism-isoenzymes with increase in bone fractions.  Recent parathyroid levels within normal range.  Patient has been followed at [facility] for elevated parathyroid levels previously.   -Will obtain bone scan for further evaluation at this time.  Patient encouraged to follow-up with her endocrinologist at [facility] for history of elevated parathyroid levels.  Discussed possibly modifying her walking/impact activities at this time until further evaluation.  We will continue to monitor alkaline phosphatase levels.  VALERIE- overall stable on current CPAP machine and tolerating well.    Follow-up: Follow-up in 4 to 6 weeks.           This medical record contains text that has been entered with the assistance of computer voice recognition and dictation software.  Therefore, it may contain unintended errors in text,  spelling, punctuation, or grammar.

## 2020-08-12 ENCOUNTER — TELEMEDICINE (OUTPATIENT)
Dept: SLEEP MEDICINE | Facility: MEDICAL CENTER | Age: 75
End: 2020-08-12
Payer: MEDICARE

## 2020-08-12 VITALS
WEIGHT: 175 LBS | SYSTOLIC BLOOD PRESSURE: 118 MMHG | HEIGHT: 65 IN | BODY MASS INDEX: 29.16 KG/M2 | HEART RATE: 58 BPM | DIASTOLIC BLOOD PRESSURE: 78 MMHG

## 2020-08-12 DIAGNOSIS — I10 ESSENTIAL HYPERTENSION: ICD-10-CM

## 2020-08-12 DIAGNOSIS — G47.33 OSA (OBSTRUCTIVE SLEEP APNEA): ICD-10-CM

## 2020-08-12 DIAGNOSIS — K21.9 GASTROESOPHAGEAL REFLUX DISEASE WITHOUT ESOPHAGITIS: ICD-10-CM

## 2020-08-12 DIAGNOSIS — I27.20 MILD PULMONARY HYPERTENSION (HCC): ICD-10-CM

## 2020-08-12 DIAGNOSIS — G47.31 COMPLEX SLEEP APNEA SYNDROME: ICD-10-CM

## 2020-08-12 PROCEDURE — 99213 OFFICE O/P EST LOW 20 MIN: CPT | Mod: 95,CR | Performed by: NURSE PRACTITIONER

## 2020-08-12 ASSESSMENT — FIBROSIS 4 INDEX: FIB4 SCORE: 2.02

## 2020-08-12 NOTE — PROGRESS NOTES
Telemedicine Visit: Established Patient     This evaluation was conducted via Zoom, using secure and encrypted videoconferencing technology.  The patient's identity was confirmed and verbal consent for the telemedicine encounter was obtained.  Given the importance of social distancing and other strategies recommended to reduce the risk of COVID-19 transmission, I am providing medical care to this patient via audio/video visit in place of an in person visit at the request of the patient.      Subjective:   CC: VALERIE f/u    Martha Ordonez is a 75 y.o. female presenting for evaluation and management of VALERIE. PMH includes mild asthma, pure hypercholesterolemia, hypertension, anxiety, transient global amnesia, GERD, fatigue, OCD, environmental allergies, increased PTH level, nocturnal oxygen desaturation, PVCs, PACs, central sleep apnea, obstructive sleep apnea, mild pulmonary hypertension, migraine.    A titration polysomnogram on June 24, 2019 demonstrated treatment emergent central apnea episodes on CPAP and BiPAP.  A follow-up titration polysomnogram on August 6, 2019 suggested a good response to ASV with expiratory pressures of 7 to 15 cm of water.  She was unable to acclimate to the ASV and most recently underwent a split-night polysomnogram on May 27, 2020.     PSG titration from 5/27/20 indicated severe obstructive sleep apnea with AHI of 78.7/hr and O2 kwadwo 76 %. Due to severity of the disease she met the split study protocol. The titration started with CPAP 6 cm and the best tolerated was CPAP 6 cm. The AHI improved to 0.82/hr with improved O2 kwadwo of 84% and average O2 saturation of 94%.     OPO on CPAP at 6 cmH2O from 6/23/20 indicated the lowest oxygen to be 84 % and she spent 6% of the recording time below 90%.  Technically the need for oxygen is determined based on time spent below 89%, therefore the % time below 90% saturation can not determine the true need for oxygen bleed in. Based on the OPO, oxygen  "bleed is not needed.      Compliance report from 7/13/20-8/11/20 was downloaded and reviewed with the patient which showed CPAP at 6 cmH2O, 100% compliance, 8 hrs 29 min use, AHI of 7.5.  She has been using 2 L of O2 as bleed in.  She notes that she sleeps more soundly and feels more rested during the day since she has been using the oxygen at night.  She is tolerating the pressure and mask well. She goes to bed between 11:30 pm-12:30 am and wakes up between 7-8 am.  She sleeps really well through the night.  She denies morning headaches or snoring.  She is not amenable to trying ASV should her central apnea increase because she did not tolerate it very well in the past.  She has an appointment coming up with a specialist to address her pulmonary hypertension.  She denies any new health problems or medications.      ROS   Denies any recent fevers or chills. No nausea or vomiting. No chest pains or shortness of breath.     Allergies   Allergen Reactions   • Ace Inhibitors      Cough   • Ceclor [Cefaclor] Hives   • Ceftin Hives   • Cephalosporins Rash   • Ciprofloxacin Rash   • Clarithromycin      Arrhythmias. Able to take other -mycins.    • Kenalog Hives   • Lamisil [Terbinafine Hcl]    • Latex Rash   • Merthiolate [Thimerosal] Hives   • Monistat [Tioconazole] Itching   • Pcn [Penicillins] Hives   • Simvastatin      Myalgia   • Trazodone Hcl      \"wired\", \"felt really hot\"   • Benzalkonium Chloride Rash   • Tetracyclines Rash       Current medicines (including changes today)  Current Outpatient Medications   Medication Sig Dispense Refill   • AZASITE 1 % ophthalmic solution      • losartan (COZAAR) 25 MG Tab Take 50 mg in morning and 25 mg in evening. 270 Tab 1   • Multiple Vitamins-Minerals (CENTRUM SILVER 50+WOMEN PO) Take  by mouth every day.     • ALBUTEROL INH Inhale  by mouth.     • sertraline (ZOLOFT) 25 MG tablet Take 1 Tab by mouth every day. 90 Tab 1   • Polyvinyl Alcohol-Povidone (REFRESH OP) Place 1 Drop " in both eyes 4 times a day as needed.     • omeprazole (PRILOSEC) 10 MG CAPSULE DELAYED RELEASE Take 1 Cap by mouth 2 times a day. 180 Cap 1   • Cholecalciferol (VITAMIN D) 2000 UNIT Tab Take 4,000 Units by mouth every day.       No current facility-administered medications for this visit.        Patient Active Problem List    Diagnosis Date Noted   • Mild pulmonary hypertension (HCC) 07/08/2020   • VALERIE (obstructive sleep apnea) 06/03/2020   • Incomplete emptying of bladder 03/04/2019   • Chronic right-sided low back pain with right-sided sciatica 01/16/2019   • Pelvic pain 01/16/2019   • Migraine without aura and without status migrainosus, not intractable 08/16/2018   • Complex sleep apnea syndrome 03/23/2018   • PVCs (premature ventricular contractions) 09/04/2017   • PAC (premature atrial contraction) 09/04/2017   • Nocturnal oxygen desaturation 08/31/2017   • Elevated alkaline phosphatase level 06/29/2016   • Increased PTH level 05/04/2016   • Vitamin D insufficiency 05/04/2016   • Environmental allergies 02/21/2016   • OCD (obsessive compulsive disorder)    • Family history of melanoma 02/12/2016   • Fatigue 12/31/2015   • Mild asthma 09/10/2014   • Pure hypercholesterolemia 09/10/2014   • Essential hypertension 09/10/2014   • Anxiety 09/10/2014   • Transient global amnesia 09/10/2014   • Gastroesophageal reflux disease without esophagitis 09/10/2014       Family History   Problem Relation Age of Onset   • Hypertension Mother    • Heart Disease Father         CHF   • Cancer Father         melanoma   • Hypertension Sister    • Stroke Sister    • Sleep Apnea Sister    • Cancer Paternal Grandfather         colon   • Heart Disease Paternal Grandfather    • Stroke Paternal Grandfather    • Diabetes Paternal Grandfather    • Other Maternal Grandmother         eplepsey   • Heart Attack Maternal Grandfather    • Stroke Maternal Grandfather    • Stroke Paternal Grandmother    • Sleep Apnea Sister        She  has a past  "medical history of Abnormal thyroid function test (9/10/2014), Allergic rhinitis, Anxiety, Asthma, Back pain, Basal cell carcinoma, Bronchitis, Cardiac arrhythmia, Depression, Elevated liver enzymes (9/10/2014), Family history of melanoma, GERD (gastroesophageal reflux disease), Glaucoma suspect of both eyes, Hyperlipidemia, Hypertension, Nasal drainage, OCD (obsessive compulsive disorder), Pancreatitis, Parathyroid disorder (HCC), PCOS (polycystic ovarian syndrome), S/P ERCP, and Transient global amnesia.  She  has a past surgical history that includes cholecystectomy; tonsillectomy; lumpectomy; abdominal hysterectomy total; cardiac cath, right heart; eye surgery; us-needle core bx-breast panel; hysterectomy laparoscopy; dilation and curettage; and adenoidectomy.       Objective:   /78   Pulse (!) 58   Ht 1.651 m (5' 5\")   Wt 79.4 kg (175 lb)   LMP  (LMP Unknown)   BMI 29.12 kg/m²     Physical Exam:  Constitutional: Alert, no distress, well-groomed.  Skin: No rashes in visible areas.  Eye: Round. Conjunctiva clear, lids normal. No icterus.   ENMT: Lips pink without lesions, good dentition, moist mucous membranes. Phonation normal.  Neck: Moves freely without pain.  CV: Pulse as reported by patient  Respiratory: Unlabored respiratory effort, no cough or audible wheeze  Psych: Alert and oriented x3, normal affect and mood.       Assessment and Plan:   The following treatment plan was discussed:     1. VALERIE (obstructive sleep apnea)    2. Complex sleep apnea syndrome    3. Mild pulmonary hypertension (HCC)    4. Essential hypertension    5. Gastroesophageal reflux disease without esophagitis    6. BMI 29.0-29.9,adult      Discussed the cardiovascular and neuropsychiatric risks of untreated VALERIE; including but not limited to: HTN, DM, MI, ASCVD, CVA, CHF, traffic accidents.     1. Compliance report from 7/13/20-8/11/20 was downloaded and reviewed with the patient which showed CPAP at 6 cmH2O, 100% compliance, " 8 hrs 29 min use, AHI of 7.5. Continue CPAP and 2L O2 bleed in. Patient is compliant and benefiting from CPAP and 2L O2 bleed in therapy for management of VALERIE.   2. DME order (CPAP and more) for mask (Simplus mask or MOC) and supplies was not needed today. Continue to clean mask and supplies weekly, and change supplies per insurance guidelines.   3.  I also reviewed detailed compliance data from June to July where the AHI was 10.7 so it did decrease to 7.5 between July and August.  Between June and July she experienced on average 5.9 central apneas and 0.5 obstructive apneas.  Between July and August she experienced on average 4.3 central apneas and 0.3 obstructive apneas.  Review 90 day compliance at next office visit to determine if she continues having CSA and if AHI is in the normal range. If not, then may need to consider a dedicated CPAP/BiPAP possible BiPAP ST titration study.  Patient is not amenable to ASV therapy as she did not tolerate this in the past.  4. Continue to stay active.   5. Follow up with the appropriate healthcare practitioners for all other medical problems and issues.  6. Sleep hygiene discussed. She has followed up with Dr. Mcleod for CBT-I.       Follow-up: Return in about 3 months (around 11/12/2020). patient has an apt with Dr. Mason she would like to keep.     RAMSEY Guerra.     This dictation was created using voice recognition software. The accuracy of the dictation is limited to the abilities of the software. I expect there may be some errors of grammar and possibly content.

## 2020-08-13 ENCOUNTER — HOSPITAL ENCOUNTER (OUTPATIENT)
Dept: RADIOLOGY | Facility: MEDICAL CENTER | Age: 75
End: 2020-08-13
Attending: FAMILY MEDICINE
Payer: MEDICARE

## 2020-08-13 DIAGNOSIS — R74.8 ELEVATED ALKALINE PHOSPHATASE LEVEL: ICD-10-CM

## 2020-08-13 PROCEDURE — A9503 TC99M MEDRONATE: HCPCS | Mod: MG

## 2020-08-17 DIAGNOSIS — F41.9 ANXIETY: ICD-10-CM

## 2020-08-17 DIAGNOSIS — R74.8 ELEVATED ALKALINE PHOSPHATASE LEVEL: ICD-10-CM

## 2020-08-17 RX ORDER — SERTRALINE HYDROCHLORIDE 25 MG/1
25-50 TABLET, FILM COATED ORAL DAILY
Qty: 180 TAB | Refills: 1 | Status: SHIPPED | OUTPATIENT
Start: 2020-08-17 | End: 2021-01-04

## 2020-10-05 ENCOUNTER — HOSPITAL ENCOUNTER (OUTPATIENT)
Dept: LAB | Facility: MEDICAL CENTER | Age: 75
End: 2020-10-05
Attending: FAMILY MEDICINE
Payer: MEDICARE

## 2020-10-05 ENCOUNTER — HOSPITAL ENCOUNTER (OUTPATIENT)
Dept: RADIOLOGY | Facility: MEDICAL CENTER | Age: 75
End: 2020-10-05
Attending: INTERNAL MEDICINE
Payer: MEDICARE

## 2020-10-05 ENCOUNTER — HOSPITAL ENCOUNTER (OUTPATIENT)
Dept: LAB | Facility: MEDICAL CENTER | Age: 75
End: 2020-10-05
Attending: INTERNAL MEDICINE
Payer: MEDICARE

## 2020-10-05 DIAGNOSIS — R74.8 ELEVATED ALKALINE PHOSPHATASE LEVEL: ICD-10-CM

## 2020-10-05 DIAGNOSIS — R74.8 ACID PHOSPHATASE ELEVATED: ICD-10-CM

## 2020-10-05 DIAGNOSIS — R79.89 INCREASED PTH LEVEL: ICD-10-CM

## 2020-10-05 LAB
ALP SERPL-CCNC: 129 U/L (ref 30–99)
CALCIUM SERPL-MCNC: 9.7 MG/DL (ref 8.5–10.5)
PTH-INTACT SERPL-MCNC: 56.7 PG/ML (ref 14–72)

## 2020-10-05 PROCEDURE — 77074 RADEX OSSEOUS SURVEY LMTD: CPT

## 2020-10-05 PROCEDURE — 83970 ASSAY OF PARATHORMONE: CPT

## 2020-10-05 PROCEDURE — 84155 ASSAY OF PROTEIN SERUM: CPT

## 2020-10-05 PROCEDURE — 84075 ASSAY ALKALINE PHOSPHATASE: CPT

## 2020-10-05 PROCEDURE — 83520 IMMUNOASSAY QUANT NOS NONAB: CPT | Mod: 91

## 2020-10-05 PROCEDURE — 84080 ASSAY ALKALINE PHOSPHATASES: CPT

## 2020-10-05 PROCEDURE — 84165 PROTEIN E-PHORESIS SERUM: CPT

## 2020-10-05 PROCEDURE — 36415 COLL VENOUS BLD VENIPUNCTURE: CPT

## 2020-10-05 PROCEDURE — 84156 ASSAY OF PROTEIN URINE: CPT

## 2020-10-07 LAB
ALP BONE SERPL-CCNC: 69 U/L (ref 0–55)
ALP ISOS SERPL HS-CCNC: 0 U/L
ALP LIVER SERPL-CCNC: 62 U/L (ref 0–94)
ALP SERPL-CCNC: 131 U/L (ref 40–120)

## 2020-10-08 LAB
COLLECT DURATION TIME SPEC: NORMAL HRS
KAPPA LC FREE 24H UR-MRATE: NORMAL MG/D
KAPPA LC FREE SER-MCNC: 18.37 MG/L (ref 3.3–19.4)
KAPPA LC FREE UR-MCNC: 15.52 MG/L (ref 0–32.9)
KAPPA LC FREE/LAMBDA FREE SER NEPH: 1.23 {RATIO} (ref 0.26–1.65)
LAMBDA LC FREE 24H UR-MRATE: NORMAL MG/D
LAMBDA LC FREE SERPL-MCNC: 14.93 MG/L (ref 5.71–26.3)
LAMBDA LC FREE UR-MCNC: 1.07 MG/L (ref 0–3.79)
PROT 24H UR-MRATE: NORMAL MG/D
SPECIMEN VOL ?TM UR: NORMAL ML

## 2020-10-09 LAB
ALBUMIN 24H MFR UR ELPH: 72.6 %
ALBUMIN SERPL ELPH-MCNC: 4.24 G/DL (ref 3.75–5.01)
ALPHA1 GLOB 24H MFR UR ELPH: 27.4 %
ALPHA1 GLOB SERPL ELPH-MCNC: 0.28 G/DL (ref 0.19–0.46)
ALPHA2 GLOB 24H MFR UR ELPH: 0 %
ALPHA2 GLOB SERPL ELPH-MCNC: 0.67 G/DL (ref 0.48–1.05)
B-GLOBULIN 24H MFR UR ELPH: 0 %
B-GLOBULIN SERPL ELPH-MCNC: 0.81 G/DL (ref 0.48–1.1)
EER MONOCLONAL PROTEIN STUDY, 24 HOUR U Q5964: NORMAL
EER PROT ELECT SER Q1092: NORMAL
GAMMA GLOB 24H MFR UR ELPH: 0 %
GAMMA GLOB SERPL ELPH-MCNC: 1.02 G/DL (ref 0.62–1.51)
INTERPRETATION SERPL IFE-IMP: NORMAL
INTERPRETATION UR IFE-IMP: NORMAL
M PROTEIN 24H MFR UR ELPH: 0 %
M PROTEIN 24H UR ELPH-MRATE: NORMAL MG/24 HRS
PROT 24H UR-MRATE: NORMAL MG/D (ref 40–150)
PROT SERPL-MCNC: 7 G/DL (ref 6.3–8.2)
PROT UR-MCNC: 10 MG/DL

## 2020-10-19 ENCOUNTER — APPOINTMENT (OUTPATIENT)
Dept: RADIOLOGY | Facility: MEDICAL CENTER | Age: 75
End: 2020-10-19
Attending: FAMILY MEDICINE
Payer: MEDICARE

## 2020-10-27 ENCOUNTER — HOSPITAL ENCOUNTER (OUTPATIENT)
Dept: RADIOLOGY | Facility: MEDICAL CENTER | Age: 75
End: 2020-10-27
Attending: FAMILY MEDICINE
Payer: MEDICARE

## 2020-10-27 DIAGNOSIS — Z12.31 VISIT FOR SCREENING MAMMOGRAM: ICD-10-CM

## 2020-10-27 PROCEDURE — 77067 SCR MAMMO BI INCL CAD: CPT

## 2020-11-11 ENCOUNTER — TELEMEDICINE (OUTPATIENT)
Dept: SLEEP MEDICINE | Facility: MEDICAL CENTER | Age: 75
End: 2020-11-11
Payer: MEDICARE

## 2020-11-11 ENCOUNTER — TELEPHONE (OUTPATIENT)
Dept: SLEEP MEDICINE | Facility: MEDICAL CENTER | Age: 75
End: 2020-11-11

## 2020-11-11 VITALS — WEIGHT: 169 LBS | BODY MASS INDEX: 28.16 KG/M2 | HEIGHT: 65 IN

## 2020-11-11 DIAGNOSIS — G47.33 OSA (OBSTRUCTIVE SLEEP APNEA): ICD-10-CM

## 2020-11-11 PROCEDURE — 99213 OFFICE O/P EST LOW 20 MIN: CPT | Mod: 95,CR | Performed by: FAMILY MEDICINE

## 2020-11-11 ASSESSMENT — FIBROSIS 4 INDEX: FIB4 SCORE: 2.02

## 2020-11-11 NOTE — PROGRESS NOTES
Telemedicine Visit: Established Patient     This encounter was conducted via Zoom. Verbal consent was obtained. Patient's identity was verified. Given the importance of social distancing and other strategies recommended to reduce the risk of COVID-19 transmission, I am providing medical care to this patient via audio/video visit in place of an in person visit at the request of the patient.    [facility] Follow Up Note     Date: 11/11/2020 / Time: 8:04 AM    Patient ID:   Name:             Martha Ordonez   YOB: 1945  Age:                 75 y.o.  female   MRN:               [id number]      Thank you for requesting a sleep medicine consultation on Martha Ordonez at the sleep center. She presents today with the chief complaints of VALERIE follow up.     HISTORY OF PRESENT ILLNESS:       Pt is currently on CPAP 6 cm with O2 bleed in at 2L/min. She has tried ASV in past however she was unable to tolerate it. She goes to sleep around 12 am and wakes up around 9 am. She has trouble falling asleep and it can take upto a hour to fall asleep. She is getting about 8+ hrs of sleep on a good night. Overall, she does finds her sleep refreshing. She does not take regular naps anymore. She denies any symptoms of RLS, narcolepsy or any symptoms to suggest parasomnias such as nightmares, sleep walking or acting out of dreams.She is using CPAP most days of the week. Pt reports 8 hrs of average nightly use of CPAP. Pt denies snoring, gasping,choking.Pt also denies significant mask leak that is interfering with sleep. The 30 day compliance was downloaded which shows adequate compliance with more that 4 hr usage about 100%. The AHI is has improved to 7.3/hr. The KAREN Is 4/hr. The symptoms of Valerie has improved with the CPAP and O2 bleed in.      SLEEP HISTORY   A titration polysomnogram on June 24, 2019 demonstrated treatment emergent central apnea episodes on CPAP and BiPAP.  A follow-up titration  polysomnogram on August 6, 2019 suggested a good response to ASV with expiratory pressures of 7 to 15 cm of water    PSG titration from 5/27/20 indicated severe obstructive sleep apnea with AHI of 78.7/hr and O2 kwadwo 76 %. Due to severity of the disease she met the split study protocol. The titration started with CPAP 6 cm and the best tolerated was CPAP 6 cm. The AHI improved to 0.82/hr with improved O2 kwadwo of 84% and average O2 saturation of 94%.     OPO on CPAP at 6 cmH2O from 6/23/20 indicated the lowest oxygen to be 84 % and she spent 6% of the recording time below 90%.  Technically the need for oxygen is determined based on time spent below 89%, therefore the % time below 90% saturation can not determine the true need for oxygen bleed in. Based on the OPO, oxygen bleed is not needed.       REVIEW OF SYSTEMS:       Constitutional: Denies fevers, Denies weight changes  Eyes: Denies changes in vision, no eye pain  Ears/Nose/Throat/Mouth: Denies nasal congestion or sore throat   Cardiovascular: Denies chest pain or palpitations   Respiratory: Denies shortness of breath , Denies cough  Gastrointestinal/Hepatic: Denies abdominal pain, nausea, vomiting, diarrhea,   Skin/Breast: Denies rash,   Neurological: Denies headache, confusion, memory loss or focal weakness/parasthesias  Psychiatric: denies mood disorder   Sleep: denies snoring    Comprehensive review of systems form is reviewed with the patient and is attached in the EMR.     PMH:  has a past medical history of Abnormal thyroid function test (9/10/2014), Allergic rhinitis, Anxiety, Asthma, Back pain, Basal cell carcinoma, Bronchitis, Cardiac arrhythmia, Depression, Elevated liver enzymes (9/10/2014), Family history of melanoma, GERD (gastroesophageal reflux disease), Glaucoma suspect of both eyes, Hyperlipidemia, Hypertension, Nasal drainage, OCD (obsessive compulsive disorder), Pancreatitis, Parathyroid disorder (HCC), PCOS (polycystic ovarian syndrome),  "S/P ERCP, and Transient global amnesia.  MEDS:   Current Outpatient Medications:   •  GLUCOSAMINE-CHONDROITIN PO, Take  by mouth., Disp: , Rfl:   •  VENTOLIN  (90 Base) MCG/ACT Aero Soln inhalation aerosol, INHALE 2 PUFFS BY MOUTH EVERY 6 HOURS AS NEEDED, Disp: 18 Each, Rfl: 3  •  omeprazole (PRILOSEC) 10 MG CAPSULE DELAYED RELEASE, TAKE 1 CAPSULE BY MOUTH TWICE A DAY, Disp: 180 Cap, Rfl: 1  •  sertraline (ZOLOFT) 25 MG tablet, Take 1-2 Tabs by mouth every day. Gradually increase to 2 tabs as day as tolerated., Disp: 180 Tab, Rfl: 1  •  losartan (COZAAR) 25 MG Tab, Take 50 mg in morning and 25 mg in evening., Disp: 270 Tab, Rfl: 1  •  Multiple Vitamins-Minerals (CENTRUM SILVER 50+WOMEN PO), Take  by mouth every day., Disp: , Rfl:   •  ALBUTEROL INH, Inhale  by mouth., Disp: , Rfl:   •  Cholecalciferol (VITAMIN D) 2000 UNIT Tab, Take 4,000 Units by mouth every day., Disp: , Rfl:   •  AZASITE 1 % ophthalmic solution, , Disp: , Rfl:   •  Polyvinyl Alcohol-Povidone (REFRESH OP), Place 1 Drop in both eyes 4 times a day as needed., Disp: , Rfl:   ALLERGIES:   Allergies   Allergen Reactions   • Ace Inhibitors      Cough   • Ceclor [Cefaclor] Hives   • Ceftin Hives   • Cephalosporins Rash   • Ciprofloxacin Rash   • Clarithromycin      Arrhythmias. Able to take other -mycins.    • Kenalog Hives   • Lamisil [Terbinafine Hcl]    • Latex Rash   • Merthiolate [Thimerosal] Hives   • Monistat [Tioconazole] Itching   • Pcn [Penicillins] Hives   • Simvastatin      Myalgia   • Trazodone Hcl      \"wired\", \"felt really hot\"   • Benzalkonium Chloride Rash   • Tetracyclines Rash     SURGHX:   Past Surgical History:   Procedure Laterality Date   • ABDOMINAL HYSTERECTOMY TOTAL      ovaries remain   • ADENOIDECTOMY     • CARDIAC CATH, RIGHT HEART      normal, EKG was Abnormal    • CHOLECYSTECTOMY     • DILATION AND CURETTAGE     • EYE SURGERY      b/l iridotomy   • HYSTERECTOMY LAPAROSCOPY     • LUMPECTOMY      benign   • " "TONSILLECTOMY     • US-NEEDLE CORE BX-BREAST PANEL       SOCHX:  reports that she has never smoked. She has never used smokeless tobacco. She reports that she does not drink alcohol or use drugs..  FH:   Family History   Problem Relation Age of Onset   • Hypertension Mother    • Heart Disease Father         CHF   • Cancer Father         melanoma   • Hypertension Sister    • Stroke Sister    • Sleep Apnea Sister    • Cancer Paternal Grandfather         colon   • Heart Disease Paternal Grandfather    • Stroke Paternal Grandfather    • Diabetes Paternal Grandfather    • Other Maternal Grandmother         eplepsey   • Heart Attack Maternal Grandfather    • Stroke Maternal Grandfather    • Stroke Paternal Grandmother    • Sleep Apnea Sister          Physical Exam:  Vitals/ General Appearance:   Weight/BMI: Body mass index is 28.12 kg/m².  Ht 1.651 m (5' 5\")   Wt 76.7 kg (169 lb)   Vitals:    11/11/20 0747   Weight: 76.7 kg (169 lb)   Height: 1.651 m (5' 5\")       Pt. is alert and oriented to time, place and person. Cooperative and in no apparent distress.       Constitutional: Alert, no distress, well-groomed.  Skin: No rashes in visible areas.  Eye: Round. Conjunctiva clear, lids normal. No icterus.   ENMT: Lips pink without lesions, good dentition, moist mucous membranes. Phonation normal.  Neck: No masses, no thyromegaly. Moves freely without pain.  CV: Pulse as reported by patient  Respiratory: Unlabored respiratory effort, no cough or audible wheeze  Psych: Alert and oriented x3, normal affect and mood.     ASSESSMENT AND PLAN     1. Sleep Apnea : The pathophysiology of sleep anea and the increased risk of cardiovascular morbidity from untreated sleep apnea is discussed in detail with the patient.   She is urged to avoid supine sleep, weight gain and alcoholic beverages since all of these can worsen sleep apnea. She is cautioned against drowsy driving. If She feels sleepy while driving, She must pull over for a " break/nap, rather than persist on the road, in the interest of She own safety and that of others on the road.   Plan   - Continue CPAP 6 cm with O2 bleed in at 2L/min   - compliance download was reviewed and discussed with the pt   - compliance was reinforced     2. Regarding treatment of other past medical problems and general health maintenance,  She is urged to follow up with PCP.

## 2020-11-11 NOTE — PATIENT INSTRUCTIONS
"    CPAP and BPAP Information  CPAP and BPAP are methods of helping a person breathe with the use of air pressure. CPAP stands for \"continuous positive airway pressure.\" BPAP stands for \"bi-level positive airway pressure.\" In both methods, air is blown through your nose or mouth and into your air passages to help you breathe well.  CPAP and BPAP use different amounts of pressure to blow air. With CPAP, the amount of pressure stays the same while you breathe in and out. With BPAP, the amount of pressure is increased when you breathe in (inhale) so that you can take larger breaths. Your health care provider will recommend whether CPAP or BPAP would be more helpful for you.  Why are CPAP and BPAP treatments used?  CPAP or BPAP can be helpful if you have:  · Sleep apnea.  · Chronic obstructive pulmonary disease (COPD).  · Heart failure.  · Medical conditions that weaken the muscles of the chest including muscular dystrophy, or neurological diseases such as amyotrophic lateral sclerosis (ALS).  · Other problems that cause breathing to be weak, abnormal, or difficult.  CPAP is most commonly used for obstructive sleep apnea (VALERIE) to keep the airways from collapsing when the muscles relax during sleep.  How is CPAP or BPAP administered?  Both CPAP and BPAP are provided by a small machine with a flexible plastic tube that attaches to a plastic mask. You wear the mask. Air is blown through the mask into your nose or mouth. The amount of pressure that is used to blow the air can be adjusted on the machine. Your health care provider will determine the pressure setting that should be used based on your individual needs.  When should CPAP or BPAP be used?  In most cases, the mask only needs to be worn during sleep. Generally, the mask needs to be worn throughout the night and during any daytime naps. People with certain medical conditions may also need to wear the mask at other times when they are awake. Follow instructions from " your health care provider about when to use the machine.  What are some tips for using the mask?    · Because the mask needs to be snug, some people feel trapped or closed-in (claustrophobic) when first using the mask. If you feel this way, you may need to get used to the mask. One way to do this is by holding the mask loosely over your nose or mouth and then gradually applying the mask more snugly. You can also gradually increase the amount of time that you use the mask.  · Masks are available in various types and sizes. Some fit over your mouth and nose while others fit over just your nose. If your mask does not fit well, talk with your health care provider about getting a different one.  · If you are using a mask that fits over your nose and you tend to breathe through your mouth, a chin strap may be applied to help keep your mouth closed.  · The CPAP and BPAP machines have alarms that may sound if the mask comes off or develops a leak.  · If you have trouble with the mask, it is very important that you talk with your health care provider about finding a way to make the mask easier to tolerate. Do not stop using the mask. Stopping the use of the mask could have a negative impact on your health.  What are some tips for using the machine?  · Place your CPAP or BPAP machine on a secure table or stand near an electrical outlet.  · Know where the on/off switch is located on the machine.  · Follow instructions from your health care provider about how to set the pressure on your machine and when you should use it.  · Do not eat or drink while the CPAP or BPAP machine is on. Food or fluids could get pushed into your lungs by the pressure of the CPAP or BPAP.  · Do not smoke. Tobacco smoke residue can damage the machine.  · For home use, CPAP and BPAP machines can be rented or purchased through home health care companies. Many different brands of machines are available. Renting a machine before purchasing may help you  find out which particular machine works well for you.  · Keep the CPAP or BPAP machine and attachments clean. Ask your health care provider for specific instructions.  Get help right away if:  · You have redness or open areas around your nose or mouth where the mask fits.  · You have trouble using the CPAP or BPAP machine.  · You cannot tolerate wearing the CPAP or BPAP mask.  · You have pain, discomfort, and bloating in your abdomen.  Summary  · CPAP and BPAP are methods of helping a person breathe with the use of air pressure.  · Both CPAP and BPAP are provided by a small machine with a flexible plastic tube that attaches to a plastic mask.  · If you have trouble with the mask, it is very important that you talk with your health care provider about finding a way to make the mask easier to tolerate.  This information is not intended to replace advice given to you by your health care provider. Make sure you discuss any questions you have with your health care provider.  Document Released: 09/15/2005 Document Revised: 04/08/2020 Document Reviewed: 11/06/2017  Elsevier Patient Education © 2020 [employer]

## 2020-11-11 NOTE — TELEPHONE ENCOUNTER
LVM for the pt wanting to schedule a 6 month fv per Dr. Mason. I advise the pt to give us a call back to schedule that apt.

## 2021-01-14 PROCEDURE — 91300 PFIZER SARS-COV-2 VACCINE: CPT

## 2021-01-14 PROCEDURE — 0001A PFIZER SARS-COV-2 VACCINE: CPT

## 2021-01-15 ENCOUNTER — IMMUNIZATION (OUTPATIENT)
Dept: FAMILY PLANNING/WOMEN'S HEALTH CLINIC | Facility: IMMUNIZATION CENTER | Age: 76
End: 2021-01-15
Payer: MEDICARE

## 2021-01-15 DIAGNOSIS — Z23 ENCOUNTER FOR VACCINATION: Primary | ICD-10-CM

## 2021-01-16 DIAGNOSIS — Z23 NEED FOR VACCINATION: ICD-10-CM

## 2021-01-21 DIAGNOSIS — Z23 NEED FOR VACCINATION: ICD-10-CM

## 2021-02-04 ENCOUNTER — IMMUNIZATION (OUTPATIENT)
Dept: FAMILY PLANNING/WOMEN'S HEALTH CLINIC | Facility: IMMUNIZATION CENTER | Age: 76
End: 2021-02-04
Attending: INTERNAL MEDICINE
Payer: MEDICARE

## 2021-02-04 DIAGNOSIS — Z23 ENCOUNTER FOR VACCINATION: Primary | ICD-10-CM

## 2021-02-04 DIAGNOSIS — Z23 NEED FOR VACCINATION: ICD-10-CM

## 2021-02-04 PROCEDURE — 0002A PFIZER SARS-COV-2 VACCINE: CPT

## 2021-02-04 PROCEDURE — 91300 PFIZER SARS-COV-2 VACCINE: CPT

## 2021-04-15 ENCOUNTER — OFFICE VISIT (OUTPATIENT)
Dept: MEDICAL GROUP | Facility: IMAGING CENTER | Age: 76
End: 2021-04-15
Payer: MEDICARE

## 2021-04-15 VITALS
RESPIRATION RATE: 12 BRPM | WEIGHT: 169 LBS | BODY MASS INDEX: 28.16 KG/M2 | TEMPERATURE: 98.8 F | HEART RATE: 58 BPM | DIASTOLIC BLOOD PRESSURE: 72 MMHG | SYSTOLIC BLOOD PRESSURE: 136 MMHG | HEIGHT: 65 IN | OXYGEN SATURATION: 97 %

## 2021-04-15 DIAGNOSIS — Z80.8 FAMILY HISTORY OF MELANOMA: ICD-10-CM

## 2021-04-15 DIAGNOSIS — Z86.39 HISTORY OF HYPERPARATHYROIDISM: ICD-10-CM

## 2021-04-15 DIAGNOSIS — I27.20 MILD PULMONARY HYPERTENSION (HCC): ICD-10-CM

## 2021-04-15 DIAGNOSIS — E78.00 PURE HYPERCHOLESTEROLEMIA: ICD-10-CM

## 2021-04-15 DIAGNOSIS — G47.31 COMPLEX SLEEP APNEA SYNDROME: ICD-10-CM

## 2021-04-15 DIAGNOSIS — Z91.09 ENVIRONMENTAL ALLERGIES: ICD-10-CM

## 2021-04-15 DIAGNOSIS — F42.9 OBSESSIVE-COMPULSIVE DISORDER, UNSPECIFIED TYPE: ICD-10-CM

## 2021-04-15 DIAGNOSIS — G89.29 CHRONIC RIGHT-SIDED LOW BACK PAIN WITH RIGHT-SIDED SCIATICA: ICD-10-CM

## 2021-04-15 DIAGNOSIS — G43.009 MIGRAINE WITHOUT AURA AND WITHOUT STATUS MIGRAINOSUS, NOT INTRACTABLE: ICD-10-CM

## 2021-04-15 DIAGNOSIS — J45.20 MILD INTERMITTENT ASTHMA WITHOUT COMPLICATION: ICD-10-CM

## 2021-04-15 DIAGNOSIS — M54.41 CHRONIC RIGHT-SIDED LOW BACK PAIN WITH RIGHT-SIDED SCIATICA: ICD-10-CM

## 2021-04-15 DIAGNOSIS — I49.1 PAC (PREMATURE ATRIAL CONTRACTION): ICD-10-CM

## 2021-04-15 DIAGNOSIS — Z00.00 MEDICARE ANNUAL WELLNESS VISIT, SUBSEQUENT: ICD-10-CM

## 2021-04-15 DIAGNOSIS — I49.3 PVCS (PREMATURE VENTRICULAR CONTRACTIONS): ICD-10-CM

## 2021-04-15 DIAGNOSIS — R33.9 INCOMPLETE EMPTYING OF BLADDER: ICD-10-CM

## 2021-04-15 DIAGNOSIS — F43.9 STRESS: ICD-10-CM

## 2021-04-15 DIAGNOSIS — I10 ESSENTIAL HYPERTENSION: ICD-10-CM

## 2021-04-15 DIAGNOSIS — E55.9 VITAMIN D INSUFFICIENCY: ICD-10-CM

## 2021-04-15 DIAGNOSIS — K21.9 GASTROESOPHAGEAL REFLUX DISEASE WITHOUT ESOPHAGITIS: ICD-10-CM

## 2021-04-15 DIAGNOSIS — R22.2 SUBCUTANEOUS NODULE OF ABDOMINAL WALL: ICD-10-CM

## 2021-04-15 DIAGNOSIS — R74.8 ELEVATED ALKALINE PHOSPHATASE LEVEL: ICD-10-CM

## 2021-04-15 DIAGNOSIS — M79.89 MASS OF SOFT TISSUE: ICD-10-CM

## 2021-04-15 DIAGNOSIS — M85.89 OSTEOPENIA OF MULTIPLE SITES: ICD-10-CM

## 2021-04-15 DIAGNOSIS — F41.9 ANXIETY: ICD-10-CM

## 2021-04-15 PROBLEM — R10.2 PELVIC PAIN: Status: RESOLVED | Noted: 2019-01-16 | Resolved: 2021-04-15

## 2021-04-15 PROCEDURE — G0439 PPPS, SUBSEQ VISIT: HCPCS | Performed by: FAMILY MEDICINE

## 2021-04-15 ASSESSMENT — PAIN SCALES - GENERAL: PAINLEVEL: NO PAIN

## 2021-04-15 ASSESSMENT — ACTIVITIES OF DAILY LIVING (ADL): BATHING_REQUIRES_ASSISTANCE: 0

## 2021-04-15 ASSESSMENT — ENCOUNTER SYMPTOMS: GENERAL WELL-BEING: GOOD

## 2021-04-15 ASSESSMENT — PATIENT HEALTH QUESTIONNAIRE - PHQ9: CLINICAL INTERPRETATION OF PHQ2 SCORE: 0

## 2021-04-15 ASSESSMENT — FIBROSIS 4 INDEX: FIB4 SCORE: 2.02

## 2021-04-15 NOTE — PROGRESS NOTES
Chief Complaint   Patient presents with   • Annual Exam         HPI:  Martha is a 75 y.o. here for Medicare Annual Wellness Visit    Hypertension- ,may be affected by some stressors, mother dementia- family issues regarding mother's care, family not agreeing.    Taking losartan 50 twice daily currently.    Complex sleep apnea- cpap- started last year.   AHI- avg 7. Dr. Mason- May follow up.   Oxygen 2 L/m, feels it helps.  Central apnea and obstructive sleep apnea.     Mild pulmonary hypertension-plans to complete echocardiogram annually.  She will follow up with Dr. Mason in May.    PVC/PAC- less. Still some symptoms.     Migraine- stable, hardly with symptoms. CPAP helps.     History of hyperparathyroidism with elevated PTH.  Last PTH within normal range PTH- 10/2020. Checked annually.     Elevated alkaline phosphatase levels -bone scan done.   12/2020- Dr. Meier, [facility] endocrinology. Tumor board reviewed.  Recommend check alkaline phosphatase levels every 1 to 2 years.  Notes having a right lateral knee soft tissue mass which she recently noticed.    Osteopenia-last DEXA 2019.  Will be due at the end of this year for repeat imaging.    Vitamin D insufficiency-was recommended to reduce to Vitamin D 2000 IU daily.    Incomplete emptying of bladder-long-term issue.  Notes she has slow emptying.  Otherwise no symptoms.  No hematuria or dysuria.    Chronic back pain- stable. Hx of bursitis. Some flare ups.   Lumbar DJD.     Ocd/anxiety- sertraline 25 mg daily. Stress may further contribute.  Sometimes issues with finding words more so associated with stress currently.    GERD-stable on omeprazole 10 mg twice daily.    Asthma- mild.  Has been otherwise stable without significant issues.  Environmental allergies-stable.    Hypercholesterolemia-not on medication therapy.  Due for lab work.    Family history of melanoma-does see her dermatologist routinely.  Parents have diabetes.    Has a nodule of her abdominal region which  she feels may be associated with prior surgery and possible scar tissue.    Patient Active Problem List    Diagnosis Date Noted   • Mild pulmonary hypertension (HCC) 07/08/2020   • VALERIE (obstructive sleep apnea) 06/03/2020   • Incomplete emptying of bladder 03/04/2019   • Chronic right-sided low back pain with right-sided sciatica 01/16/2019   • Pelvic pain 01/16/2019   • Migraine without aura and without status migrainosus, not intractable 08/16/2018   • Complex sleep apnea syndrome 03/23/2018   • PVCs (premature ventricular contractions) 09/04/2017   • PAC (premature atrial contraction) 09/04/2017   • Nocturnal oxygen desaturation 08/31/2017   • Elevated alkaline phosphatase level 06/29/2016   • Increased PTH level 05/04/2016   • Vitamin D insufficiency 05/04/2016   • Environmental allergies 02/21/2016   • OCD (obsessive compulsive disorder)    • Family history of melanoma 02/12/2016   • Fatigue 12/31/2015   • Mild asthma 09/10/2014   • Pure hypercholesterolemia 09/10/2014   • Essential hypertension 09/10/2014   • Anxiety 09/10/2014   • Transient global amnesia 09/10/2014   • Gastroesophageal reflux disease without esophagitis 09/10/2014       Current Outpatient Medications   Medication Sig Dispense Refill   • Multiple Vitamins-Minerals (PRESERVISION AREDS 2 PO) Take  by mouth.     • omeprazole (PRILOSEC) 10 MG CAPSULE DELAYED RELEASE TAKE 1 CAPSULE BY MOUTH TWICE A  capsule 0   • losartan (COZAAR) 25 MG Tab TAKE 50 MG IN MORNING AND 25 MG IN EVENING. (Patient taking differently: Take 50 mg in morning and 50 mg in evening.) 270 Tab 1   • sertraline (ZOLOFT) 25 MG tablet TAKE 1-2 TABS BY MOUTH EVERY DAY. GRADUALLY INCREASE TO 2 TABS AS DAY AS TOLERATED. (Patient taking differently: Take 25 mg by mouth every day. Gradually increase to 2 tabs as day as tolerated.) 180 Tab 1   • GLUCOSAMINE-CHONDROITIN PO Take  by mouth.     • VENTOLIN  (90 Base) MCG/ACT Aero Soln inhalation aerosol INHALE 2 PUFFS BY  MOUTH EVERY 6 HOURS AS NEEDED 18 Each 3   • AZASITE 1 % ophthalmic solution      • Polyvinyl Alcohol-Povidone (REFRESH OP) Place 1 Drop in both eyes 4 times a day as needed.     • Cholecalciferol (VITAMIN D) 2000 UNIT Tab Take 2,000 Units by mouth every day.       No current facility-administered medications for this visit.        Patient is taking medications as noted in medication list.  Current supplements as per medication list.     Allergies: Ace inhibitors, Ceclor [cefaclor], Ceftin, Cephalosporins, Ciprofloxacin, Clarithromycin, Kenalog, Lamisil [terbinafine hcl], Latex, Merthiolate [thimerosal], Monistat [tioconazole], Pcn [penicillins], Simvastatin, Trazodone hcl, Benzalkonium chloride, and Tetracyclines    Current social contact/activities: yes     Is patient current with immunizations? No, due for TDAP. Patient is interested in receiving NONE today.    She  reports that she has never smoked. She has never used smokeless tobacco. She reports that she does not drink alcohol and does not use drugs.  Counseling given: Not Answered        DPA/Advanced directive: Patient has Advanced Directive, but it is not on file. Instructed to bring in a copy to scan into their chart.    ROS:    Gait: Uses no assistive device   Ostomy: No   Other tubes: No   Amputations: No   Chronic oxygen use Yes, 2L at night  Last eye exam 04/01/21   Wears hearing aids: No   : Denies any urinary leakage during the last 6 months      Screening:    Reviewed     Depression Screening    Little interest or pleasure in doing things?  0 - not at all  Feeling down, depressed, or hopeless? 0 - not at all  Patient Health Questionnaire Score: 0    If depressive symptoms identified deferred to follow up visit unless specifically addressed in assessment and plan.    Interpretation of PHQ-9 Total Score   Score Severity   1-4 No Depression   5-9 Mild Depression   10-14 Moderate Depression   15-19 Moderately Severe Depression   20-27 Severe  Depression    Screening for Cognitive Impairment    Three Minute Recall (captain, garden, picture)  3/3    Dallin clock face with all 12 numbers and set the hands to show 5 past 8.  Yes    If cognitive concerns identified, deferred for follow up unless specifically addressed in assessment and plan.    Fall Risk Assessment    Has the patient had two or more falls in the last year or any fall with injury in the last year?  No  If fall risk identified, deferred for follow up unless specifically addressed in assessment and plan.    Safety Assessment    Throw rugs on floor.  Yes  Handrails on all stairs.  No  Good lighting in all hallways.  Yes  Difficulty hearing.  No  Patient counseled about all safety risks that were identified.    Functional Assessment ADLs    Are there any barriers preventing you from cooking for yourself or meeting nutritional needs?  No.    Are there any barriers preventing you from driving safely or obtaining transportation?  No.    Are there any barriers preventing you from using a telephone or calling for help?  No.    Are there any barriers preventing you from shopping?  No.    Are there any barriers preventing you from taking care of your own finances?  No.    Are there any barriers preventing you from managing your medications?  No.    Are there any barriers preventing you from showering, bathing or dressing yourself?  No.    Are you currently engaging in any exercise or physical activity?  Yes.     What is your perception of your health?  Good.    Health Maintenance Summary                Annual Wellness Visit Overdue 6/2/2018      Done 6/1/2017 Visit Dx: Medicare annual wellness visit, initial    IMM DTaP/Tdap/Td Vaccine Overdue 12/1/2020      Done 12/1/2010 Imm Admin: Tdap Vaccine    MAMMOGRAM Next Due 10/27/2021      Done 10/27/2020 MA-SCREENING MAMMO BILAT W/TOMOSYNTHESIS W/CAD     Patient has more history with this topic...    COLONOSCOPY Next Due 9/10/2023      Done 9/10/2018 REFERRAL  TO GI FOR COLONOSCOPY     Patient has more history with this topic...    BONE DENSITY Next Due 11/21/2024      Done 11/21/2019 DS-BONE DENSITY STUDY (DEXA)     Patient has more history with this topic...          Patient Care Team:  Sabra Arguello M.D. as PCP - General (Family Medicine)  Mike Villanueva M.D. as Consulting Physician (Ophthalmology)  Diana Ren, PT, DPT as Physical Therapist (Physical Therapy)    Social History     Tobacco Use   • Smoking status: Never Smoker   • Smokeless tobacco: Never Used   Substance Use Topics   • Alcohol use: No     Alcohol/week: 0.0 oz   • Drug use: No     Family History   Problem Relation Age of Onset   • Hypertension Mother    • Heart Disease Father         CHF   • Cancer Father         melanoma   • Hypertension Sister    • Stroke Sister    • Sleep Apnea Sister    • Cancer Paternal Grandfather         colon   • Heart Disease Paternal Grandfather    • Stroke Paternal Grandfather    • Diabetes Paternal Grandfather    • Other Maternal Grandmother         eplepsey   • Heart Attack Maternal Grandfather    • Stroke Maternal Grandfather    • Stroke Paternal Grandmother    • Sleep Apnea Sister      She  has a past medical history of Abnormal thyroid function test (9/10/2014), Allergic rhinitis, Anxiety, Asthma, Back pain, Basal cell carcinoma, Bronchitis, Cardiac arrhythmia, Depression, Elevated liver enzymes (9/10/2014), Family history of melanoma, GERD (gastroesophageal reflux disease), Glaucoma suspect of both eyes, Hyperlipidemia, Hypertension, Nasal drainage, OCD (obsessive compulsive disorder), Pancreatitis, Parathyroid disorder (HCC), PCOS (polycystic ovarian syndrome), S/P ERCP, and Transient global amnesia.   Past Surgical History:   Procedure Laterality Date   • ABDOMINAL HYSTERECTOMY TOTAL      ovaries remain   • ADENOIDECTOMY     • CARDIAC CATH, RIGHT HEART      normal, EKG was Abnormal    • CHOLECYSTECTOMY     • DILATION AND CURETTAGE     • EYE SURGERY      b/l  "iridotomy   • HYSTERECTOMY LAPAROSCOPY     • LUMPECTOMY      benign   • TONSILLECTOMY     • US-NEEDLE CORE BX-BREAST PANEL         Exam:     /72   Pulse (!) 58   Temp 37.1 °C (98.8 °F)   Resp 12   Ht 1.651 m (5' 5\")   Wt 76.7 kg (169 lb)   SpO2 97%  Body mass index is 28.12 kg/m².    Hearing good.    Dentition good  Alert, oriented in no acute distress.  Eye contact is good, speech goal directed, affect calm  Constitutional: She appears well-developed and well-nourished. She appears not diaphoretic. No distress.   HENT: Right Ear: External ear normal. Left Ear: External ear normal.   Nose: Nose normal.   Mouth/Throat: Oropharynx is clear and moist. No oropharyngeal exudate.     Eyes: Conjunctivae and extraocular motions are normal. Pupils are equal, round, and reactive to light. No scleral icterus.   Neck: Normal range of motion. Neck supple. No thyromegaly present.   Cardiovascular: Normal rate, regular rhythm, normal heart sounds and intact distal pulses.  Exam reveals no gallop and no friction rub.  No murmur heard. No carotid bruits.   Pulmonary/Chest: Effort normal and breath sounds normal. No respiratory distress. She has no wheezes. She has no rales.   Abdominal: Soft. Bowel sounds are normal. She exhibits no distension and no mass. No tenderness. She has no rebound and no guarding. Right lateral abdomen with~1 cm subcutaneous nodule,somewhat mobile (notes prior history of surgery).  Lymphadenopathy:  She has no cervical adenopathy.   Neurological: She is alert. She has normal reflexes. No cranial nerve deficit. She exhibits normal muscle tone.   Skin: Skin is warm and dry. No rash noted. She is not diaphoretic. No erythema.   Psychiatric: She has a normal mood and affect. Her behavior is normal.   Musculoskeletal: She exhibits no edema. Full strength throughout. 2+ DTR throughout.  Right lower extremity lateral to knee region with 3-4 cm soft tissue mass, nontender.       Assessment and Plan. The " following treatment and monitoring plan is recommended:      75-year-old female for annual wellness visit.    1. Medicare annual wellness visit, subsequent     2. Essential hypertension  Comp Metabolic Panel   3. Complex sleep apnea syndrome     4. Mild pulmonary hypertension (HCC)     5. PVCs (premature ventricular contractions)     6. PAC (premature atrial contraction)     7. Migraine without aura and without status migrainosus, not intractable     8. History of hyperparathyroidism     9. Elevated alkaline phosphatase level  ALKALINE PHOSPHATASE ISOENZYMES    CBC WITH DIFFERENTIAL   10. Mass of soft tissue  US-EXTREMITY NON VASCULAR UNILATERAL RIGHT   11. Osteopenia of multiple sites     12. Vitamin D insufficiency  VITAMIN D,25 HYDROXY   13. Incomplete emptying of bladder  URINALYSIS,CULTURE IF INDICATED   14. Chronic right-sided low back pain with right-sided sciatica     15. Anxiety     16. Obsessive-compulsive disorder, unspecified type     17. Stress     18. Gastroesophageal reflux disease without esophagitis     19. Mild intermittent asthma without complication     20. Pure hypercholesterolemia  TSH WITH REFLEX TO FT4    Lipid Profile   21. Environmental allergies     22. Family history of melanoma     23. Subcutaneous nodule of abdominal wall     -Hypertension-stable.  Continue current medication.  Recommend heart healthy diet and routine exercise.  Continue to monitor.  -Complex sleep apnea-currently on CPAP and supplemental oxygen at night.  Followed by pulmonary Dr. Mason.  -Mild pulmonary hypertension-monitor.  Plan to repeat echocardiogram in future.  -PVC/PACs-improved.  Intermittently still with symptoms.  Keep well-hydrated, avoid caffeine, and appropriate electrolyte intake recommended.  Continue use of CPAP.  Monitor.  -Migraine-overall stable.  Monitor.  -History of hyperparathyroidism-last PTH within normal range.    Continue to monitor annually.  -Elevated alkaline phosphatase level-evaluated  by tumor board and endocrinology at [facility].  Had prior bone scan.  We will plan to monitor alkaline phosphatase levels annually.  -Right lateral knee soft tissue mass-will image with ultrasound.  -Osteopenia-recheck DEXA end of 2021.    Recommend routine weightbearing exercises with adequate calcium vitamin D intake.  -Vitamin D insufficiency-she is currently on vitamin D 2000 IU daily, recommended to reduce dose.  Monitor in future.  -Incomplete emptying of bladder-chronic issue.  Overall stable. Monitor.   -Chronic low back pain right side-stable.  Hx of bursitis.    Recommend routine stretching and strengthening activities. Monitor.   -Anxiety/OCD-stable.  Continue current medication.  Recommend routine self care activities.   -Stress-may affect anxiety/OCD and memory issues currently.  Recommend self care activities and routine stress management. Monitor.   -GERD-stable.  Continue current medication.  Modify diet.  -Mild asthma-stable.  Albuterol as needed.  -Environmental allergies-stable.  OTC medication as needed.  -Hypercholesterolemia-due for repeat lab work.  -Family history of melanoma-recommend routine screening with dermatology.  -Subcutaneous nodule of abdominal wall-likely benign, consider cyst or underlying scarof area.  Monitor.  Follow-up if any changes of area.        Services suggested: No services needed at this time  Health Care Screening recommendations as per orders if indicated.  Referrals offered: PT/OT/Nutrition counseling/Behavioral Health/Smoking cessation as per orders if indicated.    Discussion today about general wellness and lifestyle habits:    · Prevent falls and reduce trip hazards; Cautioned about securing or removing rugs.  · Have a working fire alarm and carbon monoxide detector;   · Engage in regular physical activity and social activities       Follow-up: Follow-up after lab work and imaging as needed.  Otherwise routine follow-up in 6 months.  Annual wellness visit.

## 2021-04-27 ENCOUNTER — APPOINTMENT (RX ONLY)
Dept: URBAN - METROPOLITAN AREA CLINIC 4 | Facility: CLINIC | Age: 76
Setting detail: DERMATOLOGY
End: 2021-04-27

## 2021-04-27 DIAGNOSIS — L738 OTHER SPECIFIED DISEASES OF HAIR AND HAIR FOLLICLES: ICD-10-CM

## 2021-04-27 DIAGNOSIS — D22 MELANOCYTIC NEVI: ICD-10-CM

## 2021-04-27 DIAGNOSIS — L82.0 INFLAMED SEBORRHEIC KERATOSIS: ICD-10-CM

## 2021-04-27 DIAGNOSIS — L663 OTHER SPECIFIED DISEASES OF HAIR AND HAIR FOLLICLES: ICD-10-CM

## 2021-04-27 DIAGNOSIS — D18.0 HEMANGIOMA: ICD-10-CM

## 2021-04-27 DIAGNOSIS — L72.0 EPIDERMAL CYST: ICD-10-CM

## 2021-04-27 DIAGNOSIS — Z85.828 PERSONAL HISTORY OF OTHER MALIGNANT NEOPLASM OF SKIN: ICD-10-CM

## 2021-04-27 DIAGNOSIS — L81.4 OTHER MELANIN HYPERPIGMENTATION: ICD-10-CM

## 2021-04-27 DIAGNOSIS — L73.9 FOLLICULAR DISORDER, UNSPECIFIED: ICD-10-CM

## 2021-04-27 DIAGNOSIS — K13.79 OTHER LESIONS OF ORAL MUCOSA: ICD-10-CM

## 2021-04-27 DIAGNOSIS — L82.1 OTHER SEBORRHEIC KERATOSIS: ICD-10-CM

## 2021-04-27 DIAGNOSIS — L57.0 ACTINIC KERATOSIS: ICD-10-CM

## 2021-04-27 DIAGNOSIS — L24 IRRITANT CONTACT DERMATITIS: ICD-10-CM

## 2021-04-27 PROBLEM — D22.5 MELANOCYTIC NEVI OF TRUNK: Status: ACTIVE | Noted: 2021-04-27

## 2021-04-27 PROBLEM — L24.9 IRRITANT CONTACT DERMATITIS, UNSPECIFIED CAUSE: Status: ACTIVE | Noted: 2021-04-27

## 2021-04-27 PROBLEM — D18.01 HEMANGIOMA OF SKIN AND SUBCUTANEOUS TISSUE: Status: ACTIVE | Noted: 2021-04-27

## 2021-04-27 PROBLEM — L02.821 FURUNCLE OF HEAD [ANY PART, EXCEPT FACE]: Status: ACTIVE | Noted: 2021-04-27

## 2021-04-27 PROCEDURE — ? DEFER

## 2021-04-27 PROCEDURE — ? ADDITIONAL NOTES

## 2021-04-27 PROCEDURE — ? DIAGNOSIS COMMENT

## 2021-04-27 PROCEDURE — ? COUNSELING

## 2021-04-27 PROCEDURE — 17003 DESTRUCT PREMALG LES 2-14: CPT

## 2021-04-27 PROCEDURE — 99213 OFFICE O/P EST LOW 20 MIN: CPT | Mod: 25

## 2021-04-27 PROCEDURE — ? LIQUID NITROGEN

## 2021-04-27 PROCEDURE — 17000 DESTRUCT PREMALG LESION: CPT

## 2021-04-27 ASSESSMENT — LOCATION ZONE DERM
LOCATION ZONE: FACE
LOCATION ZONE: SCALP
LOCATION ZONE: ARM
LOCATION ZONE: NOSE
LOCATION ZONE: MUCOUS_MEMBRANE
LOCATION ZONE: TRUNK
LOCATION ZONE: EYELID

## 2021-04-27 ASSESSMENT — LOCATION SIMPLE DESCRIPTION DERM
LOCATION SIMPLE: LEFT CHEEK
LOCATION SIMPLE: CHEST
LOCATION SIMPLE: LEFT SHOULDER
LOCATION SIMPLE: POSTERIOR SCALP
LOCATION SIMPLE: RIGHT SUPERIOR EYELID
LOCATION SIMPLE: ABDOMEN
LOCATION SIMPLE: LEFT FOREARM
LOCATION SIMPLE: SCALP
LOCATION SIMPLE: LEFT LATERAL TONGUE
LOCATION SIMPLE: RIGHT NOSE

## 2021-04-27 ASSESSMENT — LOCATION DETAILED DESCRIPTION DERM
LOCATION DETAILED: RIGHT INFERIOR OCCIPITAL SCALP
LOCATION DETAILED: EPIGASTRIC SKIN
LOCATION DETAILED: RIGHT NASAL SIDEWALL
LOCATION DETAILED: LEFT ANTERIOR SHOULDER
LOCATION DETAILED: UPPER STERNUM
LOCATION DETAILED: LEFT INFERIOR CENTRAL MALAR CHEEK
LOCATION DETAILED: RIGHT CENTRAL POSTAURICULAR SKIN
LOCATION DETAILED: RIGHT LATERAL SUPERIOR EYELID
LOCATION DETAILED: SUBXIPHOID
LOCATION DETAILED: LEFT PROXIMAL DORSAL FOREARM
LOCATION DETAILED: LEFT LATERAL TONGUE
LOCATION DETAILED: LEFT INFERIOR POSTAURICULAR SKIN
LOCATION DETAILED: MIDDLE STERNUM
LOCATION DETAILED: RIGHT INFERIOR POSTAURICULAR SKIN

## 2021-04-27 NOTE — PROCEDURE: DIAGNOSIS COMMENT
Render Risk Assessment In Note?: no
Detail Level: Simple
Comment: Patient states the CPAP machine irritates the area

## 2021-04-27 NOTE — PROCEDURE: ADDITIONAL NOTES
Additional Notes: Patient states that her scalp gets irritated from her CPAP machine.
Arava Counseling:  Patient counseled regarding adverse effects of Arava including but not limited to nausea, vomiting, abnormalities in liver function tests. Patients may develop mouth sores, rash, diarrhea, and abnormalities in blood counts. The patient understands that monitoring is required including LFTs and blood counts.  There is a rare possibility of scarring of the liver and lung problems that can occur when taking methotrexate. Persistent nausea, loss of appetite, pale stools, dark urine, cough, and shortness of breath should be reported immediately. Patient advised to discontinue Arava treatment and consult with a physician prior to attempting conception. The patient will have to undergo a treatment to eliminate Arava from the body prior to conception.
Render Risk Assessment In Note?: no
Detail Level: Simple

## 2021-05-03 ENCOUNTER — HOSPITAL ENCOUNTER (OUTPATIENT)
Dept: RADIOLOGY | Facility: MEDICAL CENTER | Age: 76
End: 2021-05-03
Attending: FAMILY MEDICINE
Payer: MEDICARE

## 2021-05-03 DIAGNOSIS — M79.89 MASS OF SOFT TISSUE: ICD-10-CM

## 2021-05-03 PROCEDURE — 76882 US LMTD JT/FCL EVL NVASC XTR: CPT | Mod: RT

## 2021-05-10 ENCOUNTER — TELEPHONE (OUTPATIENT)
Dept: SLEEP MEDICINE | Facility: MEDICAL CENTER | Age: 76
End: 2021-05-10

## 2021-05-10 NOTE — TELEPHONE ENCOUNTER
Called patient to prescreen, she asked if we could contact [facility] for her compliance report. Please advise. Thank you!

## 2021-05-11 ENCOUNTER — OFFICE VISIT (OUTPATIENT)
Dept: SLEEP MEDICINE | Facility: MEDICAL CENTER | Age: 76
End: 2021-05-11
Payer: MEDICARE

## 2021-05-11 VITALS
SYSTOLIC BLOOD PRESSURE: 112 MMHG | WEIGHT: 172 LBS | DIASTOLIC BLOOD PRESSURE: 72 MMHG | BODY MASS INDEX: 28.66 KG/M2 | RESPIRATION RATE: 16 BRPM | HEIGHT: 65 IN | OXYGEN SATURATION: 95 % | HEART RATE: 66 BPM

## 2021-05-11 DIAGNOSIS — G47.33 OSA (OBSTRUCTIVE SLEEP APNEA): ICD-10-CM

## 2021-05-11 DIAGNOSIS — G47.34 SLEEP RELATED HYPOXIA: ICD-10-CM

## 2021-05-11 DIAGNOSIS — I27.20 PULMONARY HTN (HCC): ICD-10-CM

## 2021-05-11 DIAGNOSIS — G47.00 ACUTE INSOMNIA: ICD-10-CM

## 2021-05-11 PROCEDURE — 99214 OFFICE O/P EST MOD 30 MIN: CPT | Performed by: FAMILY MEDICINE

## 2021-05-11 ASSESSMENT — FIBROSIS 4 INDEX: FIB4 SCORE: 2.02

## 2021-05-11 NOTE — PATIENT INSTRUCTIONS
"CPAP and BPAP Information  CPAP and BPAP are methods of helping a person breathe with the use of air pressure. CPAP stands for \"continuous positive airway pressure.\" BPAP stands for \"bi-level positive airway pressure.\" In both methods, air is blown through your nose or mouth and into your air passages to help you breathe well.  CPAP and BPAP use different amounts of pressure to blow air. With CPAP, the amount of pressure stays the same while you breathe in and out. With BPAP, the amount of pressure is increased when you breathe in (inhale) so that you can take larger breaths. Your health care provider will recommend whether CPAP or BPAP would be more helpful for you.  Why are CPAP and BPAP treatments used?  CPAP or BPAP can be helpful if you have:  · Sleep apnea.  · Chronic obstructive pulmonary disease (COPD).  · Heart failure.  · Medical conditions that weaken the muscles of the chest including muscular dystrophy, or neurological diseases such as amyotrophic lateral sclerosis (ALS).  · Other problems that cause breathing to be weak, abnormal, or difficult.  CPAP is most commonly used for obstructive sleep apnea (VALERIE) to keep the airways from collapsing when the muscles relax during sleep.  How is CPAP or BPAP administered?  Both CPAP and BPAP are provided by a small machine with a flexible plastic tube that attaches to a plastic mask. You wear the mask. Air is blown through the mask into your nose or mouth. The amount of pressure that is used to blow the air can be adjusted on the machine. Your health care provider will determine the pressure setting that should be used based on your individual needs.  When should CPAP or BPAP be used?  In most cases, the mask only needs to be worn during sleep. Generally, the mask needs to be worn throughout the night and during any daytime naps. People with certain medical conditions may also need to wear the mask at other times when they are awake. Follow instructions from your " health care provider about when to use the machine.  What are some tips for using the mask?    · Because the mask needs to be snug, some people feel trapped or closed-in (claustrophobic) when first using the mask. If you feel this way, you may need to get used to the mask. One way to do this is by holding the mask loosely over your nose or mouth and then gradually applying the mask more snugly. You can also gradually increase the amount of time that you use the mask.  · Masks are available in various types and sizes. Some fit over your mouth and nose while others fit over just your nose. If your mask does not fit well, talk with your health care provider about getting a different one.  · If you are using a mask that fits over your nose and you tend to breathe through your mouth, a chin strap may be applied to help keep your mouth closed.  · The CPAP and BPAP machines have alarms that may sound if the mask comes off or develops a leak.  · If you have trouble with the mask, it is very important that you talk with your health care provider about finding a way to make the mask easier to tolerate. Do not stop using the mask. Stopping the use of the mask could have a negative impact on your health.  What are some tips for using the machine?  · Place your CPAP or BPAP machine on a secure table or stand near an electrical outlet.  · Know where the on/off switch is located on the machine.  · Follow instructions from your health care provider about how to set the pressure on your machine and when you should use it.  · Do not eat or drink while the CPAP or BPAP machine is on. Food or fluids could get pushed into your lungs by the pressure of the CPAP or BPAP.  · Do not smoke. Tobacco smoke residue can damage the machine.  · For home use, CPAP and BPAP machines can be rented or purchased through home health care companies. Many different brands of machines are available. Renting a machine before purchasing may help you find out  which particular machine works well for you.  · Keep the CPAP or BPAP machine and attachments clean. Ask your health care provider for specific instructions.  Get help right away if:  · You have redness or open areas around your nose or mouth where the mask fits.  · You have trouble using the CPAP or BPAP machine.  · You cannot tolerate wearing the CPAP or BPAP mask.  · You have pain, discomfort, and bloating in your abdomen.  Summary  · CPAP and BPAP are methods of helping a person breathe with the use of air pressure.  · Both CPAP and BPAP are provided by a small machine with a flexible plastic tube that attaches to a plastic mask.  · If you have trouble with the mask, it is very important that you talk with your health care provider about finding a way to make the mask easier to tolerate.  This information is not intended to replace advice given to you by your health care provider. Make sure you discuss any questions you have with your health care provider.  Document Released: 09/15/2005 Document Revised: 04/08/2020 Document Reviewed: 11/06/2017  Elsevier Patient Education © 2020 [employer]

## 2021-05-12 RX ORDER — ZOLPIDEM TARTRATE 5 MG/1
5 TABLET ORAL NIGHTLY PRN
Qty: 15 TABLET | Refills: 0 | Status: SHIPPED | OUTPATIENT
Start: 2021-05-12 | End: 2021-06-11

## 2021-07-15 ENCOUNTER — HOSPITAL ENCOUNTER (OUTPATIENT)
Dept: CARDIOLOGY | Facility: MEDICAL CENTER | Age: 76
End: 2021-07-15
Attending: FAMILY MEDICINE
Payer: MEDICARE

## 2021-07-15 DIAGNOSIS — G47.33 OSA (OBSTRUCTIVE SLEEP APNEA): ICD-10-CM

## 2021-07-15 DIAGNOSIS — I27.20 PULMONARY HTN (HCC): ICD-10-CM

## 2021-07-15 DIAGNOSIS — G47.34 SLEEP RELATED HYPOXIA: ICD-10-CM

## 2021-07-15 PROCEDURE — 93306 TTE W/DOPPLER COMPLETE: CPT

## 2021-07-16 LAB
LV EJECT FRACT  99904: 60
LV EJECT FRACT MOD 2C 99903: 52.48
LV EJECT FRACT MOD 4C 99902: 57.07
LV EJECT FRACT MOD BP 99901: 52.7

## 2021-07-16 PROCEDURE — 93306 TTE W/DOPPLER COMPLETE: CPT | Mod: 26 | Performed by: INTERNAL MEDICINE

## 2021-08-03 ENCOUNTER — HOSPITAL ENCOUNTER (OUTPATIENT)
Dept: LAB | Facility: MEDICAL CENTER | Age: 76
End: 2021-08-03
Attending: FAMILY MEDICINE
Payer: MEDICARE

## 2021-08-03 DIAGNOSIS — E78.00 PURE HYPERCHOLESTEROLEMIA: ICD-10-CM

## 2021-08-03 DIAGNOSIS — R33.9 INCOMPLETE EMPTYING OF BLADDER: ICD-10-CM

## 2021-08-03 DIAGNOSIS — R74.8 ELEVATED ALKALINE PHOSPHATASE LEVEL: ICD-10-CM

## 2021-08-03 DIAGNOSIS — I10 ESSENTIAL HYPERTENSION: ICD-10-CM

## 2021-08-03 DIAGNOSIS — E55.9 VITAMIN D INSUFFICIENCY: ICD-10-CM

## 2021-08-03 LAB
25(OH)D3 SERPL-MCNC: 51 NG/ML (ref 30–100)
ALBUMIN SERPL BCP-MCNC: 4.3 G/DL (ref 3.2–4.9)
ALBUMIN/GLOB SERPL: 1.5 G/DL
ALP SERPL-CCNC: 132 U/L (ref 30–99)
ALT SERPL-CCNC: 15 U/L (ref 2–50)
ANION GAP SERPL CALC-SCNC: 9 MMOL/L (ref 7–16)
APPEARANCE UR: CLEAR
AST SERPL-CCNC: 23 U/L (ref 12–45)
BASOPHILS # BLD AUTO: 1.2 % (ref 0–1.8)
BASOPHILS # BLD: 0.05 K/UL (ref 0–0.12)
BILIRUB SERPL-MCNC: 0.8 MG/DL (ref 0.1–1.5)
BILIRUB UR QL STRIP.AUTO: NEGATIVE
BUN SERPL-MCNC: 15 MG/DL (ref 8–22)
CALCIUM SERPL-MCNC: 9.7 MG/DL (ref 8.5–10.5)
CHLORIDE SERPL-SCNC: 102 MMOL/L (ref 96–112)
CHOLEST SERPL-MCNC: 214 MG/DL (ref 100–199)
CO2 SERPL-SCNC: 27 MMOL/L (ref 20–33)
COLOR UR: YELLOW
CREAT SERPL-MCNC: 0.8 MG/DL (ref 0.5–1.4)
EOSINOPHIL # BLD AUTO: 0.28 K/UL (ref 0–0.51)
EOSINOPHIL NFR BLD: 6.5 % (ref 0–6.9)
ERYTHROCYTE [DISTWIDTH] IN BLOOD BY AUTOMATED COUNT: 41.5 FL (ref 35.9–50)
FASTING STATUS PATIENT QL REPORTED: NORMAL
GLOBULIN SER CALC-MCNC: 2.8 G/DL (ref 1.9–3.5)
GLUCOSE SERPL-MCNC: 97 MG/DL (ref 65–99)
GLUCOSE UR STRIP.AUTO-MCNC: NEGATIVE MG/DL
HCT VFR BLD AUTO: 42 % (ref 37–47)
HDLC SERPL-MCNC: 63 MG/DL
HGB BLD-MCNC: 13.8 G/DL (ref 12–16)
IMM GRANULOCYTES # BLD AUTO: 0.01 K/UL (ref 0–0.11)
IMM GRANULOCYTES NFR BLD AUTO: 0.2 % (ref 0–0.9)
KETONES UR STRIP.AUTO-MCNC: NEGATIVE MG/DL
LDLC SERPL CALC-MCNC: 127 MG/DL
LEUKOCYTE ESTERASE UR QL STRIP.AUTO: NEGATIVE
LYMPHOCYTES # BLD AUTO: 1.75 K/UL (ref 1–4.8)
LYMPHOCYTES NFR BLD: 40.7 % (ref 22–41)
MCH RBC QN AUTO: 31.7 PG (ref 27–33)
MCHC RBC AUTO-ENTMCNC: 32.9 G/DL (ref 33.6–35)
MCV RBC AUTO: 96.6 FL (ref 81.4–97.8)
MICRO URNS: NORMAL
MONOCYTES # BLD AUTO: 0.35 K/UL (ref 0–0.85)
MONOCYTES NFR BLD AUTO: 8.1 % (ref 0–13.4)
NEUTROPHILS # BLD AUTO: 1.86 K/UL (ref 2–7.15)
NEUTROPHILS NFR BLD: 43.3 % (ref 44–72)
NITRITE UR QL STRIP.AUTO: NEGATIVE
NRBC # BLD AUTO: 0 K/UL
NRBC BLD-RTO: 0 /100 WBC
PH UR STRIP.AUTO: 6.5 [PH] (ref 5–8)
PLATELET # BLD AUTO: 204 K/UL (ref 164–446)
PMV BLD AUTO: 10.4 FL (ref 9–12.9)
POTASSIUM SERPL-SCNC: 4.1 MMOL/L (ref 3.6–5.5)
PROT SERPL-MCNC: 7.1 G/DL (ref 6–8.2)
PROT UR QL STRIP: NEGATIVE MG/DL
RBC # BLD AUTO: 4.35 M/UL (ref 4.2–5.4)
RBC UR QL AUTO: NEGATIVE
SODIUM SERPL-SCNC: 138 MMOL/L (ref 135–145)
SP GR UR STRIP.AUTO: 1.01
TRIGL SERPL-MCNC: 120 MG/DL (ref 0–149)
TSH SERPL DL<=0.005 MIU/L-ACNC: 2.78 UIU/ML (ref 0.38–5.33)
UROBILINOGEN UR STRIP.AUTO-MCNC: 0.2 MG/DL
WBC # BLD AUTO: 4.3 K/UL (ref 4.8–10.8)

## 2021-08-03 PROCEDURE — 84075 ASSAY ALKALINE PHOSPHATASE: CPT | Mod: XU

## 2021-08-03 PROCEDURE — 85025 COMPLETE CBC W/AUTO DIFF WBC: CPT

## 2021-08-03 PROCEDURE — 80053 COMPREHEN METABOLIC PANEL: CPT

## 2021-08-03 PROCEDURE — 84443 ASSAY THYROID STIM HORMONE: CPT

## 2021-08-03 PROCEDURE — 80061 LIPID PANEL: CPT

## 2021-08-03 PROCEDURE — 82306 VITAMIN D 25 HYDROXY: CPT

## 2021-08-03 PROCEDURE — 81003 URINALYSIS AUTO W/O SCOPE: CPT

## 2021-08-03 PROCEDURE — 36415 COLL VENOUS BLD VENIPUNCTURE: CPT

## 2021-08-03 PROCEDURE — 84080 ASSAY ALKALINE PHOSPHATASES: CPT

## 2021-08-06 LAB
ALP BONE SERPL-CCNC: 77 U/L (ref 0–55)
ALP ISOS SERPL HS-CCNC: 0 U/L
ALP LIVER SERPL-CCNC: 58 U/L (ref 0–94)
ALP SERPL-CCNC: 135 U/L (ref 40–120)

## 2021-08-10 DIAGNOSIS — D72.819 LEUKOPENIA, UNSPECIFIED TYPE: ICD-10-CM

## 2021-08-17 NOTE — TELEPHONE ENCOUNTER
From: Martha Ordonez  To: Harshad Mason M.D.  Sent: 6/18/2020 12:21 PM PDT  Subject: Prescription Question    Request for overnight PulseOx through [facility]:   I am sleeping every night with CPAP without any medication at all but do have some wake-ups. Apparently the pressure of 6 is taking care of my obstructive sleep apneas but am having some centrals. I wake up very pale, absolutely no color in my face but no cyanosis. I may need oxygen in addition to CPAP. Please order an overnight PulseOx and FAX the order to [facility]. I have an appointment with them tomorrow, Friday, June 19 at 2pm. They do not charge me or Medicare for the PulseOx so I want to do it through them. Thank you for your help in this matter so that I can get the PulseOx tomorrow so we can know for sure if I need the oxygen support at night.   Sincerely,  Martha Ordonez   [id number] Detailed

## 2021-08-25 ENCOUNTER — OFFICE VISIT (OUTPATIENT)
Dept: CARDIOLOGY | Facility: MEDICAL CENTER | Age: 76
End: 2021-08-25
Payer: MEDICARE

## 2021-08-25 VITALS
WEIGHT: 171 LBS | DIASTOLIC BLOOD PRESSURE: 72 MMHG | BODY MASS INDEX: 28.49 KG/M2 | RESPIRATION RATE: 14 BRPM | OXYGEN SATURATION: 97 % | HEIGHT: 65 IN | HEART RATE: 68 BPM | SYSTOLIC BLOOD PRESSURE: 112 MMHG

## 2021-08-25 DIAGNOSIS — R00.2 PALPITATIONS: ICD-10-CM

## 2021-08-25 DIAGNOSIS — R06.09 DOE (DYSPNEA ON EXERTION): ICD-10-CM

## 2021-08-25 DIAGNOSIS — E78.5 DYSLIPIDEMIA: ICD-10-CM

## 2021-08-25 DIAGNOSIS — G47.33 OSA (OBSTRUCTIVE SLEEP APNEA): ICD-10-CM

## 2021-08-25 PROCEDURE — 99214 OFFICE O/P EST MOD 30 MIN: CPT | Performed by: INTERNAL MEDICINE

## 2021-08-25 RX ORDER — PRAVASTATIN SODIUM 20 MG
20 TABLET ORAL DAILY
Qty: 90 TABLET | Refills: 3 | Status: SHIPPED | OUTPATIENT
Start: 2021-08-25 | End: 2021-10-14 | Stop reason: SDUPTHER

## 2021-08-25 ASSESSMENT — FIBROSIS 4 INDEX: FIB4 SCORE: 2.18

## 2021-08-25 NOTE — PROGRESS NOTES
"CARDIOLOGY OUTPATIENT FOLLOWUP    PCP: Sabra Arguello M.D.    1. CALDERON (dyspnea on exertion)    2. Palpitations    3. VALERIE (obstructive sleep apnea)    4. Dyslipidemia      Martha Ordonez is having dyspnea on exertion and an elevated 10-year ASCVD risk.  I recommended a stress MPI as well as pravastatin 20 mg nightly.  She has had intolerance to statins in the past but has agreed to try again.  Her palpitations are most suggestive of PSVT, well managed with intermittent Valsalva.  Symptoms are sufficiently controlled and thus more advanced treatments are not necessary.    Follow up: as needed  -she will be moving to [facility] soon    Chief Complaint   Patient presents with   • Palpitations       History: Martha Ordonez is a 76 y.o. female past medical history of sleep apnea presenting for follow-up of echocardiogram.  She had been concerned about past pulmonary hypertension as an echo a few years ago showed an RVSP of 40 and a updated echocardiogram estimated it at 30 mmHg.  Additionally she was concerned about the ejection fraction changing from 70 to 60%.    She does report exertional shortness of breath since the spring of this year.  She is still able to complete the task without rest but feels short of breath when doing them.  She even noticed this  in [place] visiting her family-when she struggled to keep pace with the others.    She continues to experience episodic palpitations at night which responded promptly to a Valsalva.  She has felt this throughout much of her life and worn monitors without any clear diagnosis      ROS:   All other systems reviewed and negative except as per the HPI    PE:  /72 (BP Location: Left arm, Patient Position: Sitting, BP Cuff Size: Adult)   Pulse 68   Resp 14   Ht 1.651 m (5' 5\")   Wt 77.6 kg (171 lb)   LMP  (LMP Unknown)   SpO2 97%   BMI 28.46 kg/m²   Gen: no acute distress  HEENT: Symmetric face. Anicteric sclerae. Moist mucus " "membranes  NECK: No JVD. No lymphadenopathy  CARDIAC: Regular, Normal S1, S2, No murmur  VASCULATURE: carotids are normal bilaterally without bruit  RESP: Clear to auscultation bilaterally  ABD: Soft, non-tender, non-distended  EXT: No edema, no clubbing or cyanosis  SKIN: Warm and dry  NEURO: No gross deficits  PSYCH: Appropriate affect, participates in conversation    The 10-year ASCVD risk score (Iris MILES Jr., et al., 2013) is: 16.7%    Past Medical History:   Diagnosis Date   • Abnormal thyroid function test 9/10/2014   • Allergic rhinitis    • Anxiety    • Asthma    • Back pain    • Basal cell carcinoma     squamos cell right ear   • Bronchitis    • Cardiac arrhythmia    • Depression    • Elevated liver enzymes 9/10/2014   • Family history of melanoma    • Fatigue 12/31/2015 October 2015    • GERD (gastroesophageal reflux disease)    • Glaucoma suspect of both eyes    • Hyperlipidemia    • Hypertension    • Increased PTH level 5/4/2016   • Nasal drainage    • OCD (obsessive compulsive disorder)    • Pancreatitis    • Parathyroid disorder (HCC)    • PCOS (polycystic ovarian syndrome)    • S/P ERCP    • Transient global amnesia      Allergies   Allergen Reactions   • Ace Inhibitors      Cough   • Ceclor [Cefaclor] Hives   • Ceftin Hives   • Cephalosporins Rash   • Ciprofloxacin Rash   • Clarithromycin      Arrhythmias. Able to take other -mycins.    • Kenalog Hives   • Lamisil [Terbinafine Hcl]    • Latex Rash   • Merthiolate [Thimerosal] Hives   • Monistat [Tioconazole] Itching   • Pcn [Penicillins] Hives   • Simvastatin      Myalgia   • Trazodone Hcl      \"wired\", \"felt really hot\"   • Benzalkonium Chloride Rash   • Tetracyclines Vomiting     Outpatient Encounter Medications as of 8/25/2021   Medication Sig Dispense Refill   • B Complex-Biotin-FA (B COMPLETE PO) Take  by mouth.     • omeprazole (PRILOSEC) 10 MG CAPSULE DELAYED RELEASE TAKE 1 CAPSULE BY MOUTH TWICE A  capsule 1   • fluconazole " (DIFLUCAN) 150 MG tablet Take 1 tablet by mouth every day. 1 tablet 0   • methocarbamol (ROBAXIN) 500 MG Tab Take 1-2 Tablets by mouth 2 times a day as needed. 60 tablet 0   • losartan (COZAAR) 50 MG Tab Take 1 tablet by mouth 2 times a day. 180 tablet 1   • Multiple Vitamins-Minerals (PRESERVISION AREDS 2 PO) Take  by mouth.     • sertraline (ZOLOFT) 25 MG tablet TAKE 1-2 TABS BY MOUTH EVERY DAY. GRADUALLY INCREASE TO 2 TABS AS DAY AS TOLERATED. (Patient taking differently: Take 25 mg by mouth every day. Gradually increase to 2 tabs as day as tolerated.) 180 Tab 1   • GLUCOSAMINE-CHONDROITIN PO Take  by mouth.     • VENTOLIN  (90 Base) MCG/ACT Aero Soln inhalation aerosol INHALE 2 PUFFS BY MOUTH EVERY 6 HOURS AS NEEDED 18 Each 3   • Polyvinyl Alcohol-Povidone (REFRESH OP) Place 1 Drop in both eyes 4 times a day as needed.     • Cholecalciferol (VITAMIN D) 2000 UNIT Tab Take 2,000 Units by mouth every day.     • AZASITE 1 % ophthalmic solution        No facility-administered encounter medications on file as of 8/25/2021.     Social History     Socioeconomic History   • Marital status:      Spouse name: Not on file   • Number of children: Not on file   • Years of education: Not on file   • Highest education level: Not on file   Occupational History   • Occupation: RN- retired   Tobacco Use   • Smoking status: Never Smoker   • Smokeless tobacco: Never Used   Vaping Use   • Vaping Use: Never used   Substance and Sexual Activity   • Alcohol use: No     Alcohol/week: 0.0 oz   • Drug use: No   • Sexual activity: Yes     Partners: Male     Comment: , retired RN   Other Topics Concern   •  Service No   • Blood Transfusions No   • Caffeine Concern No   • Occupational Exposure No   • Hobby Hazards No   • Sleep Concern Yes   • Stress Concern No   • Weight Concern Yes   • Special Diet Yes   • Back Care No   • Exercise Yes   • Bike Helmet Yes   • Seat Belt Yes   • Self-Exams Yes   Social History  Narrative    , 2 children- daughters.      Social Determinants of Health     Financial Resource Strain:    • Difficulty of Paying Living Expenses:    Food Insecurity:    • Worried About Running Out of Food in the Last Year:    • Ran Out of Food in the Last Year:    Transportation Needs:    • Lack of Transportation (Medical):    • Lack of Transportation (Non-Medical):    Physical Activity:    • Days of Exercise per Week:    • Minutes of Exercise per Session:    Stress:    • Feeling of Stress :    Social Connections:    • Frequency of Communication with Friends and Family:    • Frequency of Social Gatherings with Friends and Family:    • Attends Confucianist Services:    • Active Member of Clubs or Organizations:    • Attends Club or Organization Meetings:    • Marital Status:    Intimate Partner Violence:    • Fear of Current or Ex-Partner:    • Emotionally Abused:    • Physically Abused:    • Sexually Abused:        Studies  Lab Results   Component Value Date/Time    CHOLSTRLTOT 214 (H) 08/03/2021 10:14 AM     (H) 08/03/2021 10:14 AM    HDL 63 08/03/2021 10:14 AM    TRIGLYCERIDE 120 08/03/2021 10:14 AM       Lab Results   Component Value Date/Time    SODIUM 138 08/03/2021 10:14 AM    POTASSIUM 4.1 08/03/2021 10:14 AM    CHLORIDE 102 08/03/2021 10:14 AM    CO2 27 08/03/2021 10:14 AM    GLUCOSE 97 08/03/2021 10:14 AM    BUN 15 08/03/2021 10:14 AM    CREATININE 0.80 08/03/2021 10:14 AM     Lab Results   Component Value Date/Time    ALKPHOSPHAT 132 (H) 08/03/2021 10:14 AM    ASTSGOT 23 08/03/2021 10:14 AM    ALTSGPT 15 08/03/2021 10:14 AM    TBILIRUBIN 0.8 08/03/2021 10:14 AM        For this encounter I reviewed the following  (Sleep medicine) notes, Lipid profile and Echo

## 2021-09-03 ENCOUNTER — HOSPITAL ENCOUNTER (OUTPATIENT)
Dept: RADIOLOGY | Facility: MEDICAL CENTER | Age: 76
End: 2021-09-03
Attending: INTERNAL MEDICINE
Payer: MEDICARE

## 2021-09-03 DIAGNOSIS — R06.09 DOE (DYSPNEA ON EXERTION): ICD-10-CM

## 2021-09-03 PROCEDURE — 78452 HT MUSCLE IMAGE SPECT MULT: CPT | Mod: 26 | Performed by: INTERNAL MEDICINE

## 2021-09-03 PROCEDURE — 93018 CV STRESS TEST I&R ONLY: CPT | Performed by: INTERNAL MEDICINE

## 2021-09-03 PROCEDURE — A9502 TC99M TETROFOSMIN: HCPCS

## 2021-10-22 ENCOUNTER — HOSPITAL ENCOUNTER (OUTPATIENT)
Dept: LAB | Facility: MEDICAL CENTER | Age: 76
End: 2021-10-22
Attending: FAMILY MEDICINE
Payer: MEDICARE

## 2021-10-22 DIAGNOSIS — D72.819 LEUKOPENIA, UNSPECIFIED TYPE: ICD-10-CM

## 2021-10-22 LAB
BASOPHILS # BLD AUTO: 1.1 % (ref 0–1.8)
BASOPHILS # BLD: 0.05 K/UL (ref 0–0.12)
EOSINOPHIL # BLD AUTO: 0.19 K/UL (ref 0–0.51)
EOSINOPHIL NFR BLD: 4 % (ref 0–6.9)
ERYTHROCYTE [DISTWIDTH] IN BLOOD BY AUTOMATED COUNT: 41.1 FL (ref 35.9–50)
HCT VFR BLD AUTO: 40.1 % (ref 37–47)
HGB BLD-MCNC: 13.6 G/DL (ref 12–16)
IMM GRANULOCYTES # BLD AUTO: 0.02 K/UL (ref 0–0.11)
IMM GRANULOCYTES NFR BLD AUTO: 0.4 % (ref 0–0.9)
LYMPHOCYTES # BLD AUTO: 1.75 K/UL (ref 1–4.8)
LYMPHOCYTES NFR BLD: 36.9 % (ref 22–41)
MCH RBC QN AUTO: 32.4 PG (ref 27–33)
MCHC RBC AUTO-ENTMCNC: 33.9 G/DL (ref 33.6–35)
MCV RBC AUTO: 95.5 FL (ref 81.4–97.8)
MONOCYTES # BLD AUTO: 0.38 K/UL (ref 0–0.85)
MONOCYTES NFR BLD AUTO: 8 % (ref 0–13.4)
NEUTROPHILS # BLD AUTO: 2.35 K/UL (ref 2–7.15)
NEUTROPHILS NFR BLD: 49.6 % (ref 44–72)
NRBC # BLD AUTO: 0 K/UL
NRBC BLD-RTO: 0 /100 WBC
PLATELET # BLD AUTO: 202 K/UL (ref 164–446)
PMV BLD AUTO: 10.1 FL (ref 9–12.9)
RBC # BLD AUTO: 4.2 M/UL (ref 4.2–5.4)
WBC # BLD AUTO: 4.7 K/UL (ref 4.8–10.8)

## 2021-10-22 PROCEDURE — 36415 COLL VENOUS BLD VENIPUNCTURE: CPT

## 2021-10-22 PROCEDURE — 85025 COMPLETE CBC W/AUTO DIFF WBC: CPT

## 2021-10-28 ENCOUNTER — HOSPITAL ENCOUNTER (OUTPATIENT)
Dept: RADIOLOGY | Facility: MEDICAL CENTER | Age: 76
End: 2021-10-28
Attending: FAMILY MEDICINE
Payer: MEDICARE

## 2021-10-28 DIAGNOSIS — Z12.31 VISIT FOR SCREENING MAMMOGRAM: ICD-10-CM

## 2021-10-28 PROCEDURE — 77063 BREAST TOMOSYNTHESIS BI: CPT

## 2022-06-23 NOTE — ED NOTES
"Med rec updated and complete  Allergies reviewed  Pt states \"No antibiotics in the last 30 days\".    "
"Onset of \"gerd' since eating spicy south dressing on Thanksgiving. EKG done on arrival.  "
Discharge and f/u instructions discussed and understanding verbalized.  Ambulatory out of ED.  RX x 1 given and x 2 sent electronically. .  
Medicated as ordered.  Resting quietly at this time, aware waiting for final test results.  
Post GI cocktail, states pain has improved but is not completely resolved at this time.    
Radiology at bedside    
Report to LORE Romero while to lunch.  
Avinash

## 2022-11-11 ENCOUNTER — PATIENT MESSAGE (OUTPATIENT)
Dept: HEALTH INFORMATION MANAGEMENT | Facility: OTHER | Age: 77
End: 2022-11-11

## 2023-08-24 NOTE — PROGRESS NOTES
SUBJECTIVE:        Chief Complaint   Patient presents with   • Results       HPI:     Martha Ordonez is a 71 y.o. female here for follow up of elevated PTH labs.   She had previous evaluation with endocrinology- likely source of elevated PTH levels due to lack of vitamin D. PTH levels had improved with vitamin D supplements previously.   Patient has increased vitamin D to 4000 IU since January.   She believes these changes occurred since she had gallbladder issues.     GERD-Stopped prilosec. Now just uses pepcid as needed. She made dietary changes and elevated her bed.       Has had prior shingles vaccine completed.     Hypertension- has been on hctz 25 mg daily. Has been on norvasc previously.     Anxiety- mild, has been stable on zoloft therapy.     Has had sinus issues for 2 weeks. She is on zyrtec, mucinex, flonase. She completed antibiotics on Monday but still feels has symptoms, felt down again yesterday. Cannot tolerate much antibiotics- can take biaxin and erythromycin.  Has had the clear vesicle on right side of her palate for a while.      Ref. Range 1/11/2017 10:21   Calcium Latest Ref Range: 8.4-10.2 mg/dL 9.1   Alkaline Phosphatase Latest Ref Range: 30-99 U/L 98   25-Hydroxy   Vitamin D 25 Latest Ref Range:  ng/mL 44   Pth, Intact Latest Ref Range: 14.0-72.0 pg/mL 80.7 (H)       ROS:  Denies any recent fevers or chills. No nausea or vomiting. No diarrhea. No chest pains or shortness of breath. No lower extremity edema.    Current Outpatient Prescriptions on File Prior to Visit   Medication Sig Dispense Refill   • hydrochlorothiazide (HYDRODIURIL) 25 MG Tab Take 1 Tab by mouth every day. 90 Tab 1   • sertraline (ZOLOFT) 25 MG tablet TAKE 1 TABLET BY MOUTH EVERY DAY. 90 Tab 2   • albuterol (VENTOLIN OR PROVENTIL) 108 (90 BASE) MCG/ACT Aero Soln inhalation aerosol Inhale 2 Puffs by mouth every 6 hours as needed. 8.5 g 3   • fluticasone (FLONASE) 50 MCG/ACT nasal spray Spray 2 Sprays in nose every  "day. 16 g 0   • Calcium-Vitamin D (CALTRATE 600 PLUS-VIT D PO) Take  by mouth.     • Cholecalciferol (VITAMIN D-3) 1000 UNITS CAPS Take 2,000 Units by mouth.     • cetirizine (ZYRTEC) 10 MG TABS Take 10 mg by mouth every day.     • aspirin EC (ECOTRIN) 81 MG TBEC Take 81 mg by mouth every day.     • Multiple Vitamins-Minerals (CENTRUM SILVER ULTRA WOMENS PO) Take  by mouth.     • Cyanocobalamin (VITAMIN B-12) 5000 MCG SUBL Place  under tongue.     • docusate sodium (COLACE) 100 MG CAPS Take 100 mg by mouth every day.     • DHA-EPA-Flaxseed Oil-Vitamin E (THERA TEARS NUTRITION PO) Take  by mouth.       No current facility-administered medications on file prior to visit.       Allergies   Allergen Reactions   • Ace Inhibitors      Cough   • Ceclor [Cefaclor] Hives   • Ceftin Hives   • Cephalosporins Rash   • Ciprofloxacin Rash   • Kenalog Hives   • Lamisil [Terbinafine Hcl]    • Latex Rash   • Merthiolate [Thimerosal] Hives   • Monistat [Tioconazole] Itching   • Pcn [Penicillins] Hives   • Sulfa Drugs Hives   • Tetracycline        Past Medical History   Diagnosis Date   • GERD (gastroesophageal reflux disease)    • Pancreatitis    • S/P ERCP    • Transient global amnesia    • PCOS (polycystic ovarian syndrome)    • Basal cell carcinoma    • Family history of melanoma    • Asthma    • OCD (obsessive compulsive disorder)    • Anxiety    • Glaucoma suspect of both eyes        OBJECTIVE:   /88 mmHg  Pulse 86  Temp(Src) 36.5 °C (97.7 °F)  Resp 16  Ht 1.651 m (5' 5\")  Wt 78.926 kg (174 lb)  BMI 28.96 kg/m2  SpO2 95%  General: Well-developed well-nourished female, no acute distress  HEENT: oropharynx - posterior soft palate on right with clear vesicle otherwise clear, eyes clear, TMs clear and intact. Left deviated septum. Left maxillary sinus ttp.   Neck: supple, no lymphadenopathy- cervical or supraclavicular, no thyromegaly  Cardiovascular: regular rate and rhythm, no murmurs, gallops, rubs  Lungs: clear to " auscultation bilaterally, no wheezes, crackles, or rhonchi  Abdomen: +bowel sounds, soft, nontender, nondistended, no rebound, no guarding, no hepatosplenomegaly  Extremities: no cyanosis, clubbing, edema  Skin: Warm and dry  Psych: appropriate mood and affect      ASSESSMENT/PLAN:     71 year old female.     1. Increased PTH level - recheck labs on increased dose of vitamin D in 6-8 weeks.  PTH INTACT   2. Vitamin D insufficiency - recheck labs.  VITAMIN D,25 HYDROXY   3. Gastroesophageal reflux disease without esophagitis - off prilosec. Stable. Modify diet. Medication as needed.     4. Essential hypertension- stable, continue current medication.  Discussed losartan therapy.     5. Anxiety - stable, continue current medication.    6. Acute non-recurrent maxillary sinusitis- appears continued symptoms, discussed continued antibiotic therapy and otc medications as needed.  Biaxin       Return in about 3 months (around 5/23/2017) for Long.                                 Elidel Counseling: Patient may experience a mild burning sensation during topical application. Elidel is not approved in children less than 2 years of age. There have been case reports of hematologic and skin malignancies in patients using topical calcineurin inhibitors although causality is questionable.

## 2023-09-08 NOTE — ASSESSMENT & PLAN NOTE
Patient declined to be treated with statin in the past. She tries to control her cholesterol with diet and exercise. Last lipid panel on 5/29/17 showed that cholesterol is not well controlled.  We discussed the risks and benefits of statin treatment again in clinic. I also reviewed previous blood tests with her in clinic. Patient agreed to try simvastatin 10 mg every evening. I advised her to take simvastatin 3 days a week first, then she can increase to 5 days a week and then 7 days a week, slowly if she tolerates.    Results for SVETA DEB PETERSEN (MRN [id number]) as of 2/21/2018 20:40   Ref. Range 9/15/2014 11:19 1/4/2016 11:05 5/29/2017 10:57   Cholesterol,Tot Latest Ref Range: 100 - 199 mg/dL 230 (H) 234 (H) 221 (H)   Triglycerides Latest Ref Range: 0 - 149 mg/dL 110 78 124   HDL Latest Ref Range: >=40 mg/dL 73 69 67   LDL Latest Ref Range: <100 mg/dL 135 (H) 149 (H) 129 (H)     
Patient has elevated parathyroid hormone chronically. Her vitamin D level since 2016. Vitamin D level was normal. She has slightly elevated alkaline phosphatase. It is likely primary hyperparathyroidism. She denied history of kidney stone and denied abdominal pain. Patient was evaluated by Dr. Brendan Meier, endocrinologist in [facility] on 10/23/17. Dr. Meier does not recommend for surgery as she does not have any indication for parathyroidectomy. According to [facility] endocrinologist note, patient insisted to seek surgical intervention and she planned to consult Dr. Toney surgeon in [place] for parathyroidectomy. Patient is asymptomatic currently. Last calcium and vitamin D level are normal. Patient will follow with endocrinologist with blood tests in 6 months.  
Patient is taking Zantac 150 mg twice a day and take Prilosec 20 mg only as needed. Patient denied side effects from taking Zantac and Prilosec. Her symptoms is well controlled with current regimens.  
Patient is using albuterol inhaler as needed for asthma. Has symptom is well-controlled with current regimens. Patient stated that she is interested to try singular. She had bronchitis and sinusitis recently and was treated with clarithromycin by [facility] clinic 2 days ago. She stated that her symptoms improved with antibiotic. She would like to try singular for her allergy symptoms and asthma. I advised patient to wait to take singular until she completes her antibiotic treatment.  
Patient stated that she was switched from hydrochlorothiazide to losartan by endocrinologist to find out her parathyroid problem related to hydrochlorothiazide. Then she switched back to hydrochlorothiazide as she does not have hypercalcemia or hypercalciuria. She is currently taking hydrochlorothiazide. Her blood pressure is stable with current regimens. She is taking baby aspirin daily. CMP on 11/27/17 showed normal electrolytes and kidney function. I reviewed the risks and benefits of hydrochlorothiazide with patient. I discussed with patient that I prefer to take losartan as hydrochlorothiazide can cause more kidney injury and dehydration. Patient agreed with that and she agreed to retake losartan only. She will take losartan low-dose 25 mg daily. She did not have side effects from taking losartan in the past. She reported having cough from taking ACE inhibitor, but no side effects from taking losartan.  
She weaned Zoloft 25 mg daily for 3 months and stopped in Dec 2017, as she does not like to take medication. She is able to control obsessive thought and compulsion. She denies side effects from taking Zoloft. She stated that she learned how to let it go. She denied depression. Her anxiety is able to control without mediation. She wants to control her symptoms without pharmacological treatment. She denies suicidal ideation or plan or homicidal ideation or plan.  
- - -

## 2023-11-03 NOTE — PROGRESS NOTES
Physical Therapy Discharge      Visit Type: Discharge Summary  Visit: 5  Referring Provider: Brit ALBERT DO  Medical Diagnosis (from order): M54.31 - Right sided sciatica     SUBJECTIVE                                                                                                               Patient states her right leg pain has improved. Patient reports her pain is at a 0/10.   Current Functional Limitations: none      OBJECTIVE                                                                                                                                    Outcome/Assessments  Outcome Measures:   OSWESTRY Total Scored: 8  OSWESTRY Total Possible Score: 50  OSWESTRY Score Calculated: 16 %  (0-20% = minimal disability; 20-40% = moderate disability; 40-60% = severe disability; 60-80% = crippled; % = bed bound) see flowsheet for additional documentation        Treatment    Un-attended Electrical Stimulation (16621/): Interferential Current  - Purpose: pain relief  - Location: bilateral lumbar paraspinals and right hip musculature  - Position: sitting  - Pulse Rate: high low sweep  - Intensity: patient tolerance  - Delivered via: pads  - Duration: 15 minutes    Results: no change in symptoms immediately following  Reaction: no adverse reaction to treatment      Therapeutic Exercise  Recumbent bike- 5 minutes with discussion on current condition  Alternate Gastrocnemius stretch in True stretch- 3 x 20 seconds  Right knee flexion stretch in True stretch- 2 x 5  Alternate hamstring stretch- 3 x 20 seconds   Alternate piriformis stretch- 3 x 20 seconds      Skilled input: verbal instruction/cues    Writer verbally educated and received verbal consent for hand placement, positioning of patient, and techniques to be performed today from patient for therapist position for techniques as described above and how they are pertinent to the patient's plan of care.  Home Exercise Program  Supine Piriformis Stretch  Telemetry Shift Summary    Rhythm SR  HR Range 61-69  Ectopy rPVC,rPAC  Measurements 0.12/0.08/0.36        Normal Values  Rhythm SR  HR Range    Measurements 0.12-0.20 / 0.06-0.10  / 0.30-0.52       with Foot on Ground  - 1 x daily - 7 x weekly - 1 sets - 3 reps - 30 seconds hold  - Long Sitting Hamstring Stretch  - 1 x daily - 7 x weekly - 1 sets - 3 reps - 30 seconds hold  - Supine Bridge  - 1 x daily - 7 x weekly - 3 sets - 6 reps  - Supine Posterior Pelvic Tilt  - 1 x daily - 7 x weekly - 3 sets - 10 reps      ASSESSMENT                                                                                                          To date the patient has made gains as expected.    Patient reports decrease in irritation noted at lumbar spine, along with right lower extremity. Patient reports ability to return to lifting and exercise routine without significant increase in irritation. Based on discussion with patient, her current functional mobility, her report of decreased irritation, and independence with HEP, she is to be discharged from skilled physical therapy at this time. Patient agrees with this.   Education:   - Results of above outlined education: Verbalizes understanding and Demonstrates understanding    PLAN                                                                                                                           Discharge from skilled therapy with instructions/recommendations to: continue home exercise program    Suggestions for next session as indicated: Progress per plan of care      Goals  Long Term Goals: to be met by end of plan of care  1. Patient will report ability to perform lifting task without increase in right side lumbar irritation.  Status: met   2. Patient will report pain at worse 5/10 or less with pain relief techniques.  Status: met   3. Patient will report ability to stand for greater then 15 minutes without increase in right side lumbar irritation.  Status: met       Therapy procedure time and total treatment time can be found documented on the Time Entry flowsheet

## 2023-11-06 NOTE — ADDENDUM NOTE
Addended by: JANE LAMA on: 10/22/2019 05:00 PM     Modules accepted: Orders     Called her daughter.  Provided updates.  Answered questions.      Daughter in agreement with patient about discharge with home therapy

## 2024-04-14 NOTE — ASSESSMENT & PLAN NOTE
Patient is taking simvastatin 10 mg every evening.  She started taking simvastatin in February 2018.  Patient stated that she concerned of her headache is triggered by simvastatin as well.  She states that she will try to continue simvastatin and continue monitor her symptoms.  She denied other side effects from taking simvastatin.  Her cholesterol level improved after starting simvastatin.    Results for DEB BANGURA (MRN [id number]) as of 8/16/2018 14:49   Ref. Range 1/4/2016 11:05 5/29/2017 10:57 6/7/2018 10:22   Cholesterol,Tot Latest Ref Range: 100 - 199 mg/dL 234 (H) 221 (H) 181   Triglycerides Latest Ref Range: 0 - 149 mg/dL 78 124 145   HDL Latest Ref Range: >=40 mg/dL 69 67 64   LDL Latest Ref Range: <100 mg/dL 149 (H) 129 (H) 88     
Patient reported that she has history of migraine when she was young and migraine resolved after menopause.  She has recurrent migraine in April 2018.  She stated that migraine resolved from taking Excedrin Migraine.  She reported that she will have migraine about once a week.  She want to monitor her symptom and continue Excedrin only.  She denied other neurological symptoms such as dizziness, weakness, sensation changes, vision changes from migraine.  
Patient stated that she noticed her blood pressure has been high frequently in dental office and at home.  She has couple episodes of blood pressure reading above 150/90.  She did not feel any symptoms when she has high blood pressure.  She tried to take additional 1 dose of losartan 25 mg daily couple days ago because of high blood pressure.  Then she noticed dizziness and nausea.  She also reported that she she has history of migraine when she was young.  She started have recurrent migraine in April 2018.  She worried migraine is triggered by losartan.    Patient could not take beta-blocker as she had slow heartbeat by taking beta-blocker in the past.  She could not take hydrochlorothiazide due to increased parathyroid hormone and concerning of calcium abnormal.  She could not tolerate lisinopril due to dry cough.  After long discussion, patient would like to keep losartan 25 mg daily and will take amlodipine 2.5 mg daily as needed to twice daily as needed if her blood pressure is above 150/90.  
14-Apr-2024 22:20